# Patient Record
Sex: FEMALE | Race: WHITE | NOT HISPANIC OR LATINO | Employment: UNEMPLOYED | URBAN - METROPOLITAN AREA
[De-identification: names, ages, dates, MRNs, and addresses within clinical notes are randomized per-mention and may not be internally consistent; named-entity substitution may affect disease eponyms.]

---

## 2017-06-19 RX ORDER — ROSUVASTATIN CALCIUM 10 MG/1
TABLET, FILM COATED ORAL
Qty: 30 TABLET | Refills: 2 | OUTPATIENT
Start: 2017-06-19

## 2017-06-20 ENCOUNTER — CLINICAL SUPPORT (OUTPATIENT)
Dept: INTERNAL MEDICINE | Facility: CLINIC | Age: 57
End: 2017-06-20

## 2017-06-20 DIAGNOSIS — M85.80 OSTEOPENIA: ICD-10-CM

## 2017-06-20 DIAGNOSIS — E78.5 HYPERLIPIDEMIA, UNSPECIFIED HYPERLIPIDEMIA TYPE: ICD-10-CM

## 2017-06-20 DIAGNOSIS — Z00.00 HEALTH CARE MAINTENANCE: Primary | ICD-10-CM

## 2017-06-20 DIAGNOSIS — Z78.0 POSTMENOPAUSAL: Primary | ICD-10-CM

## 2017-06-20 PROCEDURE — 77080 DXA BONE DENSITY AXIAL: CPT | Performed by: INTERNAL MEDICINE

## 2017-06-21 LAB
ALBUMIN SERPL-MCNC: 4.6 G/DL (ref 3.5–5.2)
ALBUMIN/GLOB SERPL: 1.8 G/DL
ALP SERPL-CCNC: 58 U/L (ref 39–117)
ALT SERPL-CCNC: 20 U/L (ref 1–33)
APPEARANCE UR: CLEAR
AST SERPL-CCNC: 17 U/L (ref 1–32)
BACTERIA #/AREA URNS HPF: ABNORMAL /HPF
BASOPHILS # BLD AUTO: 0.05 10*3/MM3 (ref 0–0.2)
BASOPHILS NFR BLD AUTO: 1.1 % (ref 0–1.5)
BILIRUB SERPL-MCNC: 0.3 MG/DL (ref 0.1–1.2)
BILIRUB UR QL STRIP: NEGATIVE
BUN SERPL-MCNC: 15 MG/DL (ref 6–20)
BUN/CREAT SERPL: 17.6 (ref 7–25)
CALCIUM SERPL-MCNC: 9.5 MG/DL (ref 8.6–10.5)
CHLORIDE SERPL-SCNC: 105 MMOL/L (ref 98–107)
CHOLEST SERPL-MCNC: 198 MG/DL (ref 0–200)
CO2 SERPL-SCNC: 27 MMOL/L (ref 22–29)
COLOR UR: YELLOW
CREAT SERPL-MCNC: 0.85 MG/DL (ref 0.57–1)
EOSINOPHIL # BLD AUTO: 0.1 10*3/MM3 (ref 0–0.7)
EOSINOPHIL NFR BLD AUTO: 2.3 % (ref 0.3–6.2)
EPI CELLS #/AREA URNS HPF: ABNORMAL /HPF
ERYTHROCYTE [DISTWIDTH] IN BLOOD BY AUTOMATED COUNT: 13.4 % (ref 11.7–13)
GLOBULIN SER CALC-MCNC: 2.5 GM/DL
GLUCOSE SERPL-MCNC: 103 MG/DL (ref 65–99)
GLUCOSE UR QL: NEGATIVE
HCT VFR BLD AUTO: 41.3 % (ref 35.6–45.5)
HCV AB S/CO SERPL IA: <0.1 S/CO RATIO (ref 0–0.9)
HDLC SERPL-MCNC: 51 MG/DL (ref 40–60)
HGB BLD-MCNC: 13.5 G/DL (ref 11.9–15.5)
HGB UR QL STRIP: ABNORMAL
IMM GRANULOCYTES # BLD: 0 10*3/MM3 (ref 0–0.03)
IMM GRANULOCYTES NFR BLD: 0 % (ref 0–0.5)
KETONES UR QL STRIP: NEGATIVE
LDLC SERPL CALC-MCNC: 117 MG/DL (ref 0–100)
LDLC/HDLC SERPL: 2.3 {RATIO}
LEUKOCYTE ESTERASE UR QL STRIP: NEGATIVE
LYMPHOCYTES # BLD AUTO: 1.18 10*3/MM3 (ref 0.9–4.8)
LYMPHOCYTES NFR BLD AUTO: 26.9 % (ref 19.6–45.3)
MCH RBC QN AUTO: 30 PG (ref 26.9–32)
MCHC RBC AUTO-ENTMCNC: 32.7 G/DL (ref 32.4–36.3)
MCV RBC AUTO: 91.8 FL (ref 80.5–98.2)
MICRO URNS: ABNORMAL
MONOCYTES # BLD AUTO: 0.27 10*3/MM3 (ref 0.2–1.2)
MONOCYTES NFR BLD AUTO: 6.2 % (ref 5–12)
MUCOUS THREADS URNS QL MICRO: PRESENT /HPF
NEUTROPHILS # BLD AUTO: 2.79 10*3/MM3 (ref 1.9–8.1)
NEUTROPHILS NFR BLD AUTO: 63.5 % (ref 42.7–76)
NITRITE UR QL STRIP: NEGATIVE
PH UR STRIP: 5.5 [PH] (ref 5–7.5)
PLATELET # BLD AUTO: 288 10*3/MM3 (ref 140–500)
POTASSIUM SERPL-SCNC: 4.5 MMOL/L (ref 3.5–5.2)
PROT SERPL-MCNC: 7.1 G/DL (ref 6–8.5)
PROT UR QL STRIP: NEGATIVE
RBC # BLD AUTO: 4.5 10*6/MM3 (ref 3.9–5.2)
RBC #/AREA URNS HPF: ABNORMAL /HPF
SODIUM SERPL-SCNC: 146 MMOL/L (ref 136–145)
SP GR UR: 1.02 (ref 1–1.03)
TRIGL SERPL-MCNC: 148 MG/DL (ref 0–150)
TSH SERPL DL<=0.005 MIU/L-ACNC: 2.94 MIU/ML (ref 0.27–4.2)
URINALYSIS REFLEX: ABNORMAL
UROBILINOGEN UR STRIP-MCNC: 0.2 MG/DL (ref 0.2–1)
VLDLC SERPL CALC-MCNC: 29.6 MG/DL (ref 5–40)
WBC # BLD AUTO: 4.39 10*3/MM3 (ref 4.5–10.7)
WBC #/AREA URNS HPF: ABNORMAL /HPF

## 2017-06-23 NOTE — PROGRESS NOTES
Your laboratory results are NORMAL. We will discuss these results in detail at your next office visit. Please call with any questions or concerns.  Sincerely,  Tiffanie Woodard MD

## 2017-06-27 ENCOUNTER — OFFICE VISIT (OUTPATIENT)
Dept: INTERNAL MEDICINE | Facility: CLINIC | Age: 57
End: 2017-06-27

## 2017-06-27 VITALS
HEART RATE: 68 BPM | OXYGEN SATURATION: 98 % | SYSTOLIC BLOOD PRESSURE: 142 MMHG | WEIGHT: 164 LBS | BODY MASS INDEX: 29.06 KG/M2 | HEIGHT: 63 IN | DIASTOLIC BLOOD PRESSURE: 70 MMHG

## 2017-06-27 DIAGNOSIS — Z00.00 HEALTH CARE MAINTENANCE: ICD-10-CM

## 2017-06-27 DIAGNOSIS — E78.5 HYPERLIPIDEMIA, UNSPECIFIED HYPERLIPIDEMIA TYPE: Primary | ICD-10-CM

## 2017-06-27 DIAGNOSIS — Z86.14 HISTORY OF MRSA INFECTION: ICD-10-CM

## 2017-06-27 PROCEDURE — 99396 PREV VISIT EST AGE 40-64: CPT | Performed by: INTERNAL MEDICINE

## 2017-06-27 PROCEDURE — 93000 ELECTROCARDIOGRAM COMPLETE: CPT | Performed by: INTERNAL MEDICINE

## 2017-06-27 RX ORDER — ROSUVASTATIN CALCIUM 10 MG/1
10 TABLET, FILM COATED ORAL DAILY
Qty: 90 TABLET | Refills: 3 | Status: SHIPPED | OUTPATIENT
Start: 2017-06-27 | End: 2018-07-10 | Stop reason: SDUPTHER

## 2017-06-27 RX ORDER — AZELASTINE HYDROCHLORIDE, FLUTICASONE PROPIONATE 137; 50 UG/1; UG/1
SPRAY, METERED NASAL
COMMUNITY
End: 2018-04-30 | Stop reason: HOSPADM

## 2017-06-27 NOTE — PROGRESS NOTES
Subjective   CPE, elevated bp    Brandy El is a 57 y.o. female who presents for a complete physical exam.  She is in general feeling well. She had eye surgery in June and bp was elevated at that time. She has elevated bp today as well. Reports some stressors in her family. This is causing her to have difficulties with sleep and worry. She has been to therapy in the remote past and does not want to restart this. She is following w/ a psychiatrist every 3 months and is taking zoloft. She reports that after her eye surgery she required large dose of ibuprofen for pain control. Stopped this about 2 weeks ago. Pain is now well controlled. She is having some swelling/ echymosis at post surgical site. Has microscopic hematuria. This has been evaluated fully by dr. Chito gama.             Review of Systems   Constitutional: Negative.    HENT: Negative.    Eyes: Negative.         S/p surgical changes/ doing well   Respiratory: Negative.    Cardiovascular: Negative.    Gastrointestinal: Negative.    Endocrine: Negative.    Genitourinary: Negative.    Musculoskeletal: Negative.    Skin: Negative.    Allergic/Immunologic: Negative.    Neurological: Negative.    Hematological: Negative.    Psychiatric/Behavioral: Positive for sleep disturbance. The patient is nervous/anxious.        The following portions of the patient's history were reviewed and updated as appropriate: allergies, current medications, past family history, past medical history, past social history, past surgical history and problem list.     Patient Active Problem List   Diagnosis   • Health care maintenance   • Hyperlipidemia       Past Medical History:   Diagnosis Date   • Anxiety    • Depression    • Gait disturbance     R foot drop   • Hyperlipidemia        Past Surgical History:   Procedure Laterality Date   • ANKLE SURGERY     • BACK SURGERY         Family History   Problem Relation Age of Onset   • COPD Mother    • Heart disease Father    • Heart  "disease Brother    • Cancer Brother    • COPD Brother        Social History     Social History   • Marital status:      Spouse name: N/A   • Number of children: N/A   • Years of education: N/A     Occupational History   • Not on file.     Social History Main Topics   • Smoking status: Never Smoker   • Smokeless tobacco: Not on file   • Alcohol use Yes   • Drug use: No   • Sexual activity: Yes     Other Topics Concern   • Not on file     Social History Narrative       Current Outpatient Prescriptions on File Prior to Visit   Medication Sig Dispense Refill   • CRESTOR 10 MG tablet Take 1 tablet by mouth daily. 90 tablet 3   • sertraline (ZOLOFT) 100 MG tablet Take  by mouth daily.     • amoxicillin (AMOXIL) 875 MG tablet Take 1 tablet by mouth 2 (two) times a day. 10 tablet 0   • neomycin-polymyxin-hydrocortisone (CORTISPORIN) 3.5-71959-7 otic solution Administer 3 drops into both ears 3 (three) times a day. 10 mL 1   • triamcinolone (KENALOG) 0.1 % ointment Apply  topically 2 (two) times a day. 15 g 0     No current facility-administered medications on file prior to visit.        No Known Allergies    Immunization History   Administered Date(s) Administered   • Tdap 06/25/2013       Objective     /70  Pulse 68  Ht 63\" (160 cm)  Wt 164 lb (74.4 kg)  SpO2 98%  BMI 29.05 kg/m2    Physical Exam   Constitutional: She is oriented to person, place, and time. She appears well-developed and well-nourished.   HENT:   Head: Normocephalic and atraumatic.   Right Ear: External ear normal.   Left Ear: External ear normal.   Nose: Nose normal.   Mouth/Throat: Oropharynx is clear and moist.   Eyes: EOM are normal. Pupils are equal, round, and reactive to light.   Post operative hematoma medial to eye. conjusctival erythema   Neck: Neck supple.   Cardiovascular: Normal rate, regular rhythm and normal heart sounds.    Pulmonary/Chest: Effort normal and breath sounds normal. No respiratory distress.   Abdominal: Soft. "   Musculoskeletal: Normal range of motion.   Neurological: She is alert and oriented to person, place, and time.   Skin: Skin is warm and dry.   Psychiatric: She has a normal mood and affect. Her behavior is normal.   Nursing note and vitals reviewed.  EKG for elevated bp. Sinus St changes in lat leads. Unchanged from 2015. Neg stress echo 2015.     Assessment/Plan   Brandy was seen today for annual exam.    Diagnoses and all orders for this visit:    Hyperlipidemia, unspecified hyperlipidemia type  -     ECG 12 Lead    Health care maintenance    History of MRSA infection  -     MRSA Screen; Future    Other orders  -     mupirocin (BACTROBAN NASAL) 2 % nasal ointment; into each nostril 2 (Two) Times a Day.        Discussion    Patient presents today for a CPE.  She is doing well. bp is mildly elevated. Gave lit on DASH diet. She should increase fitness and water. Avoid nsaids. Repeat bp in 2-3 weeks. She has a h/o MRSA. Will test for MRSA in the nares. Will treat w/ mupiricin empyrically. She may f/u w/ eye specialist for possible drainage of hematoma. Labs reviewed. She will f/u w/ her psychiatrist to discuss possibly changing zoloft. She declines referral to counseling. She will start a gratitude journal.     Patient follows a healthy diet.   Patient follows an adequate exercise regimen. Patient will schedule a mammogram. Colon cancer screening is up to date.   Pap smears are performed by the patient's gynecologist.   Immunizations are up to date.           No future appointments.

## 2017-06-30 LAB — MRSA SPEC QL CULT: NEGATIVE

## 2017-11-30 ENCOUNTER — OFFICE VISIT (OUTPATIENT)
Dept: OBSTETRICS AND GYNECOLOGY | Age: 57
End: 2017-11-30

## 2017-11-30 VITALS
HEIGHT: 63 IN | DIASTOLIC BLOOD PRESSURE: 80 MMHG | BODY MASS INDEX: 28.35 KG/M2 | WEIGHT: 160 LBS | SYSTOLIC BLOOD PRESSURE: 130 MMHG

## 2017-11-30 DIAGNOSIS — Z01.419 ENCOUNTER FOR GYNECOLOGICAL EXAMINATION: Primary | ICD-10-CM

## 2017-11-30 PROCEDURE — 99396 PREV VISIT EST AGE 40-64: CPT | Performed by: NURSE PRACTITIONER

## 2017-12-04 LAB
CONV .: NORMAL
CYTOLOGIST CVX/VAG CYTO: NORMAL
CYTOLOGY CVX/VAG DOC THIN PREP: NORMAL
DX ICD CODE: NORMAL
HIV 1 & 2 AB SER-IMP: NORMAL
OTHER STN SPEC: NORMAL
PATH REPORT.FINAL DX SPEC: NORMAL
STAT OF ADQ CVX/VAG CYTO-IMP: NORMAL

## 2018-04-30 ENCOUNTER — OFFICE VISIT (OUTPATIENT)
Dept: INTERNAL MEDICINE | Facility: CLINIC | Age: 58
End: 2018-04-30

## 2018-04-30 VITALS
WEIGHT: 166 LBS | HEART RATE: 80 BPM | DIASTOLIC BLOOD PRESSURE: 80 MMHG | OXYGEN SATURATION: 98 % | BODY MASS INDEX: 29.41 KG/M2 | SYSTOLIC BLOOD PRESSURE: 140 MMHG

## 2018-04-30 DIAGNOSIS — L08.9 INFECTED SEBACEOUS CYST OF SKIN: Primary | ICD-10-CM

## 2018-04-30 DIAGNOSIS — L72.3 INFECTED SEBACEOUS CYST OF SKIN: Primary | ICD-10-CM

## 2018-04-30 PROCEDURE — 99213 OFFICE O/P EST LOW 20 MIN: CPT | Performed by: INTERNAL MEDICINE

## 2018-04-30 RX ORDER — SULFAMETHOXAZOLE AND TRIMETHOPRIM 800; 160 MG/1; MG/1
1 TABLET ORAL 2 TIMES DAILY
Qty: 14 TABLET | Refills: 0 | Status: SHIPPED | OUTPATIENT
Start: 2018-04-30 | End: 2018-08-13

## 2018-04-30 NOTE — PROGRESS NOTES
Chief Complaint   Patient presents with   • Cyst       History of Present Illness   Brandy El is a 57 y.o. female presents for acute care. She has an inflamed cyst in the vaginal region. First noted about 5 days ago. Some drainage about 2 days ago. Low grade fever. Erythema surrounding site. H/o MRSA.     The following portions of the patient's history were reviewed and updated as appropriate: allergies, current medications, past family history, past medical history, past social history, past surgical history and problem list.  Current Outpatient Prescriptions on File Prior to Visit   Medication Sig Dispense Refill   • CRESTOR 10 MG tablet Take 1 tablet by mouth Daily. 90 tablet 3   • sertraline (ZOLOFT) 100 MG tablet Take  by mouth daily.     • [DISCONTINUED] Azelastine-Fluticasone (DYMISTA) 137-50 MCG/ACT suspension into each nostril.       No current facility-administered medications on file prior to visit.      Review of Systems   Constitutional: Positive for fatigue and fever.   HENT: Negative.    Eyes: Negative.    Respiratory: Negative.    Cardiovascular: Negative.    Gastrointestinal: Negative.    Endocrine: Negative.    Genitourinary: Negative.    Musculoskeletal: Negative.    Skin:        Inflamed cyst. See hpi.    Allergic/Immunologic: Negative.    Neurological: Negative.    Hematological: Negative.        Objective   Physical Exam   Constitutional: She is oriented to person, place, and time. She appears well-developed and well-nourished.   HENT:   Head: Normocephalic and atraumatic.   Eyes: EOM are normal. Pupils are equal, round, and reactive to light.   Cardiovascular: Normal rate and regular rhythm.    Pulmonary/Chest: Effort normal.   Musculoskeletal: Normal range of motion.   Neurological: She is alert and oriented to person, place, and time.   Skin: Skin is warm and dry.   Inflamed cyst left upper vaginal region. Surrounding erythema. Site of apparent drainage.    Psychiatric: She has a normal  mood and affect. Her behavior is normal. Judgment and thought content normal.        /80   Pulse 80   Wt 75.3 kg (166 lb)   SpO2 98%   BMI 29.41 kg/m²     Assessment/Plan   Diagnoses and all orders for this visit:    Infected sebaceous cyst of skin    Other orders  -     sulfamethoxazole-trimethoprim (BACTRIM DS) 800-160 MG per tablet; Take 1 tablet by mouth 2 (Two) Times a Day.  -     mupirocin (BACTROBAN) 2 % ointment; Apply  topically 3 (Three) Times a Day.        Patient w/ infected sebaceous cyst. Has self drained so will not repeat I and D in office today. Given surrounding erythema and h/o MRSA Will try oral bactrim bid x 7 d w/ mupirocin ointment. Will avoid sun exposure. Follow up if worsens or no improvement.

## 2018-07-05 RX ORDER — ROSUVASTATIN CALCIUM 10 MG/1
TABLET, FILM COATED ORAL
Qty: 30 TABLET | Refills: 2 | OUTPATIENT
Start: 2018-07-05

## 2018-07-10 RX ORDER — ROSUVASTATIN CALCIUM 10 MG/1
TABLET, FILM COATED ORAL
Qty: 30 TABLET | Refills: 2 | Status: SHIPPED | OUTPATIENT
Start: 2018-07-10 | End: 2018-11-12 | Stop reason: SDUPTHER

## 2018-08-13 ENCOUNTER — OFFICE VISIT (OUTPATIENT)
Dept: INTERNAL MEDICINE | Facility: CLINIC | Age: 58
End: 2018-08-13

## 2018-08-13 VITALS
BODY MASS INDEX: 29.23 KG/M2 | SYSTOLIC BLOOD PRESSURE: 130 MMHG | WEIGHT: 165 LBS | HEART RATE: 69 BPM | DIASTOLIC BLOOD PRESSURE: 74 MMHG | OXYGEN SATURATION: 98 %

## 2018-08-13 DIAGNOSIS — L30.9 DERMATITIS: ICD-10-CM

## 2018-08-13 DIAGNOSIS — R19.7 DIARRHEA, UNSPECIFIED TYPE: ICD-10-CM

## 2018-08-13 DIAGNOSIS — E78.5 HYPERLIPIDEMIA, UNSPECIFIED HYPERLIPIDEMIA TYPE: ICD-10-CM

## 2018-08-13 DIAGNOSIS — Z00.00 HEALTHCARE MAINTENANCE: Primary | ICD-10-CM

## 2018-08-13 PROCEDURE — 90632 HEPA VACCINE ADULT IM: CPT | Performed by: INTERNAL MEDICINE

## 2018-08-13 PROCEDURE — 93000 ELECTROCARDIOGRAM COMPLETE: CPT | Performed by: INTERNAL MEDICINE

## 2018-08-13 PROCEDURE — 99396 PREV VISIT EST AGE 40-64: CPT | Performed by: INTERNAL MEDICINE

## 2018-08-13 PROCEDURE — 90471 IMMUNIZATION ADMIN: CPT | Performed by: INTERNAL MEDICINE

## 2018-08-13 RX ORDER — HYDROCORTISONE BUTYRATE 1 MG/G
OINTMENT TOPICAL EVERY 12 HOURS SCHEDULED
Qty: 45 G | Refills: 1 | Status: SHIPPED | OUTPATIENT
Start: 2018-08-13 | End: 2019-02-19

## 2018-08-13 RX ORDER — DICYCLOMINE HYDROCHLORIDE 10 MG/1
10 CAPSULE ORAL
Qty: 30 CAPSULE | Refills: 2 | Status: SHIPPED | OUTPATIENT
Start: 2018-08-13 | End: 2019-02-19

## 2018-08-13 NOTE — PROGRESS NOTES
Subjective   CPE, diarrhea  Brandy El is a 58 y.o. female who presents for a complete physical exam as well as to discuss needs that are new to me. She reports that she has had a relatively new onset of loose stooling. She had explosive diarrhea in early July. She thought that this was a stomach bug. Low grade fever upper 99s. 48 hours later it resolved without meds and she then had constipation. She did have associated cramping and pain. Since that time she has alternated loose stools with normal stools and constipation. Has not taken any medications for this. She does again have low grade fever when she has loose stools.         Review of Systems   Constitutional: Positive for fever.   HENT: Negative.    Eyes: Negative.    Respiratory: Negative.    Cardiovascular: Negative.    Gastrointestinal: Positive for diarrhea.   Endocrine: Negative.    Genitourinary: Negative.    Musculoskeletal: Negative.    Skin: Positive for rash.   Allergic/Immunologic: Negative.    Neurological: Negative.    Hematological: Negative.    Psychiatric/Behavioral: Negative.        The following portions of the patient's history were reviewed and updated as appropriate: allergies, current medications, past family history, past medical history, past social history, past surgical history and problem list.     Patient Active Problem List   Diagnosis   • Health care maintenance   • Hyperlipidemia   • History of MRSA infection       Past Medical History:   Diagnosis Date   • Anxiety    • ASCUS of cervix with negative high risk HPV 09/2001   • ASCUS of cervix with negative high risk HPV 09/2005   • Depression    • Essential hematuria    • Gait disturbance     R foot drop   • Hyperlipidemia    • Postmenopausal    • Vaginal delivery        Past Surgical History:   Procedure Laterality Date   • ANKLE SURGERY     • BACK SURGERY     • LAPAROSCOPIC TUBAL LIGATION Bilateral    • TEAR DUCT SURGERY  06/2017       Family History   Problem Relation Age of  Onset   • COPD Mother    • Heart disease Father    • Heart disease Brother    • Cancer Brother    • COPD Brother    • Breast cancer Sister    • No Known Problems Daughter    • No Known Problems Son    • No Known Problems Maternal Grandmother    • No Known Problems Paternal Grandmother    • No Known Problems Maternal Aunt    • No Known Problems Paternal Aunt    • BRCA 1/2 Neg Hx    • Colon cancer Neg Hx    • Endometrial cancer Neg Hx    • Ovarian cancer Neg Hx        Social History     Social History   • Marital status:      Spouse name: N/A   • Number of children: N/A   • Years of education: N/A     Occupational History   • Not on file.     Social History Main Topics   • Smoking status: Never Smoker   • Smokeless tobacco: Not on file   • Alcohol use Yes   • Drug use: No   • Sexual activity: Yes     Birth control/ protection: Post-menopausal     Other Topics Concern   • Not on file     Social History Narrative   • No narrative on file       Current Outpatient Prescriptions on File Prior to Visit   Medication Sig Dispense Refill   • CRESTOR 10 MG tablet TAKE ONE TABLET BY MOUTH DAILY 30 tablet 2   • sertraline (ZOLOFT) 100 MG tablet Take  by mouth daily.     • mupirocin (BACTROBAN) 2 % ointment Apply  topically 3 (Three) Times a Day. 15 g 0   • [DISCONTINUED] sulfamethoxazole-trimethoprim (BACTRIM DS) 800-160 MG per tablet Take 1 tablet by mouth 2 (Two) Times a Day. 14 tablet 0     No current facility-administered medications on file prior to visit.        No Known Allergies    Immunization History   Administered Date(s) Administered   • Tdap 06/25/2013       Objective     /74   Pulse 69   Wt 74.8 kg (165 lb)   SpO2 98%   BMI 29.23 kg/m²     Physical Exam   Constitutional: She is oriented to person, place, and time. She appears well-developed and well-nourished.   HENT:   Head: Normocephalic and atraumatic.   Right Ear: Hearing, tympanic membrane and external ear normal.   Left Ear: Hearing, tympanic  membrane and external ear normal.   Nose: Nose normal.   Mouth/Throat: Oropharynx is clear and moist.   Eyes: Pupils are equal, round, and reactive to light. Conjunctivae and EOM are normal.   Neck: Normal range of motion. Neck supple. No thyromegaly present.   Cardiovascular: Normal rate, regular rhythm, normal heart sounds and intact distal pulses.    No murmur heard.  Pulmonary/Chest: Effort normal and breath sounds normal. Right breast exhibits no mass. Left breast exhibits no mass.   Abdominal: Soft. She exhibits no distension. There is no hepatosplenomegaly. There is no tenderness.   Genitourinary: No breast tenderness.   Musculoskeletal: Normal range of motion.   Lymphadenopathy:     She has no cervical adenopathy.   Neurological: She is alert and oriented to person, place, and time.   Skin: Skin is warm and dry. Rash noted.   Psychiatric: She has a normal mood and affect. Her speech is normal and behavior is normal. Judgment and thought content normal. Cognition and memory are normal.   Nursing note and vitals reviewed.      Assessment/Plan   Brandy was seen today for diarrhea.    Diagnoses and all orders for this visit:    Healthcare maintenance  -     CBC & Differential  -     Comprehensive Metabolic Panel  -     Lipid Panel With LDL / HDL Ratio  -     Celiac Disease Panel  -     TSH    Diarrhea, unspecified type  -     Ambulatory Referral to Gastroenterology  -     CBC & Differential  -     Comprehensive Metabolic Panel  -     Lipid Panel With LDL / HDL Ratio  -     Celiac Disease Panel    Hyperlipidemia, unspecified hyperlipidemia type  -     ECG 12 Lead    Dermatitis    Other orders  -     dicyclomine (BENTYL) 10 MG capsule; Take 1 capsule by mouth 4 (Four) Times a Day Before Meals & at Bedtime.  -     hydrocortisone butyrate (LOCOID) 0.1 % ointment ointment; Apply  topically to the appropriate area as directed Every 12 (Twelve) Hours.        Discussion    Patient presents today for a CPE.      Patient  follows a healthy diet.   Patient follows an adequate exercise regimen. Mammogram is up to date.   Colon cancer screening is up to date.   Patient has been referred for colon cancer screening.   Pap smears are performed by the patient's gynecologist.   Immunizations are up to date.    I have recommended that the patient get the new HZV shot called Shingrix and hepatitis A immunizations. Listed labs will be tested. Patient has a family history of celiac and she has new gi symptoms. Will test for celiac and listed labs. Try bentyl prn. Trial lactose free. Do a food diary. She may be due for cscope and will f/u w/ gi for this. F/u here in 3 mo or prn.           Future Appointments  Date Time Provider Department Center   3/27/2019 8:00 AM [facility] None   4/1/2019 3:30 PM Tiffanie Woodard MD [facility] None

## 2018-08-14 LAB
ALBUMIN SERPL-MCNC: 5 G/DL (ref 3.5–5.2)
ALBUMIN/GLOB SERPL: 2.3 G/DL
ALP SERPL-CCNC: 58 U/L (ref 39–117)
ALT SERPL-CCNC: 20 U/L (ref 1–33)
AST SERPL-CCNC: 14 U/L (ref 1–32)
BASOPHILS # BLD AUTO: 0.04 10*3/MM3 (ref 0–0.2)
BASOPHILS NFR BLD AUTO: 1 % (ref 0–1.5)
BILIRUB SERPL-MCNC: 0.4 MG/DL (ref 0.1–1.2)
BUN SERPL-MCNC: 18 MG/DL (ref 6–20)
BUN/CREAT SERPL: 22.5 (ref 7–25)
CALCIUM SERPL-MCNC: 9.2 MG/DL (ref 8.6–10.5)
CHLORIDE SERPL-SCNC: 102 MMOL/L (ref 98–107)
CHOLEST SERPL-MCNC: 196 MG/DL (ref 0–200)
CO2 SERPL-SCNC: 27.8 MMOL/L (ref 22–29)
CREAT SERPL-MCNC: 0.8 MG/DL (ref 0.57–1)
ENDOMYSIUM IGA SER QL: NEGATIVE
EOSINOPHIL # BLD AUTO: 0.11 10*3/MM3 (ref 0–0.7)
EOSINOPHIL NFR BLD AUTO: 2.8 % (ref 0.3–6.2)
ERYTHROCYTE [DISTWIDTH] IN BLOOD BY AUTOMATED COUNT: 13 % (ref 11.7–13)
GLOBULIN SER CALC-MCNC: 2.2 GM/DL
GLUCOSE SERPL-MCNC: 100 MG/DL (ref 65–99)
HCT VFR BLD AUTO: 44.7 % (ref 35.6–45.5)
HDLC SERPL-MCNC: 51 MG/DL (ref 40–60)
HGB BLD-MCNC: 14.1 G/DL (ref 11.9–15.5)
IGA SERPL-MCNC: 183 MG/DL (ref 87–352)
IMM GRANULOCYTES # BLD: 0 10*3/MM3 (ref 0–0.03)
IMM GRANULOCYTES NFR BLD: 0 % (ref 0–0.5)
LDLC SERPL CALC-MCNC: 114 MG/DL (ref 0–100)
LDLC/HDLC SERPL: 2.24 {RATIO}
LYMPHOCYTES # BLD AUTO: 0.99 10*3/MM3 (ref 0.9–4.8)
LYMPHOCYTES NFR BLD AUTO: 25.3 % (ref 19.6–45.3)
MCH RBC QN AUTO: 29.4 PG (ref 26.9–32)
MCHC RBC AUTO-ENTMCNC: 31.5 G/DL (ref 32.4–36.3)
MCV RBC AUTO: 93.3 FL (ref 80.5–98.2)
MONOCYTES # BLD AUTO: 0.23 10*3/MM3 (ref 0.2–1.2)
MONOCYTES NFR BLD AUTO: 5.9 % (ref 5–12)
NEUTROPHILS # BLD AUTO: 2.54 10*3/MM3 (ref 1.9–8.1)
NEUTROPHILS NFR BLD AUTO: 65 % (ref 42.7–76)
PLATELET # BLD AUTO: 228 10*3/MM3 (ref 140–500)
POTASSIUM SERPL-SCNC: 4.3 MMOL/L (ref 3.5–5.2)
PROT SERPL-MCNC: 7.2 G/DL (ref 6–8.5)
RBC # BLD AUTO: 4.79 10*6/MM3 (ref 3.9–5.2)
SODIUM SERPL-SCNC: 144 MMOL/L (ref 136–145)
TRIGL SERPL-MCNC: 155 MG/DL (ref 0–150)
TSH SERPL DL<=0.005 MIU/L-ACNC: 1.7 MIU/ML (ref 0.27–4.2)
TTG IGA SER-ACNC: <2 U/ML (ref 0–3)
VLDLC SERPL CALC-MCNC: 31 MG/DL (ref 5–40)
WBC # BLD AUTO: 3.91 10*3/MM3 (ref 4.5–10.7)

## 2018-10-17 ENCOUNTER — OFFICE VISIT (OUTPATIENT)
Dept: GASTROENTEROLOGY | Facility: CLINIC | Age: 58
End: 2018-10-17

## 2018-10-17 VITALS
SYSTOLIC BLOOD PRESSURE: 128 MMHG | WEIGHT: 168 LBS | TEMPERATURE: 98.6 F | BODY MASS INDEX: 29.77 KG/M2 | HEIGHT: 63 IN | DIASTOLIC BLOOD PRESSURE: 76 MMHG

## 2018-10-17 DIAGNOSIS — Z80.0 FH: ESOPHAGEAL CANCER: ICD-10-CM

## 2018-10-17 DIAGNOSIS — R10.13 DYSPEPSIA: ICD-10-CM

## 2018-10-17 DIAGNOSIS — R19.7 DIARRHEA, UNSPECIFIED TYPE: ICD-10-CM

## 2018-10-17 DIAGNOSIS — R19.4 CHANGE IN BOWEL HABITS: Primary | ICD-10-CM

## 2018-10-17 PROCEDURE — 99204 OFFICE O/P NEW MOD 45 MIN: CPT | Performed by: INTERNAL MEDICINE

## 2018-10-17 NOTE — PROGRESS NOTES
"Chief Complaint   Patient presents with   • Diarrhea       History of Present Illness: 59 yo female who c/o a one month h/o diarrhea that started about 6 weeks ago and has now stopped. She has bouts of intermittent diarrhea. This bout 6 weeks ago lasted longer than usual.  NO rectal bleeding or melena. Occasional constipation. \"I have always had a messed up stomach\". She has periumbilical and left sideed abdominal pain intermittently that is worse when she has diarrhea. The dicyclomine helps. Some nausea, no vomiting. Occasional heartburn. Weight stable. No foreign travel. Lasst antibiotics: 1 yr ago. No new meds.  We reviewed 8/18 labs done. Brother had esophageal cancer.     Past Medical History:   Diagnosis Date   • Anxiety    • ASCUS of cervix with negative high risk HPV 09/2001   • ASCUS of cervix with negative high risk HPV 09/2005   • Depression    • Essential hematuria    • Gait disturbance     R foot drop   • Hyperlipidemia    • Postmenopausal    • Vaginal delivery        Past Surgical History:   Procedure Laterality Date   • ANKLE SURGERY     • BACK SURGERY     • COLONOSCOPY  10/22/2013    tics, [facility], [facility]   • LAPAROSCOPIC TUBAL LIGATION Bilateral    • TEAR DUCT SURGERY  06/2017         Current Outpatient Prescriptions:   •  CRESTOR 10 MG tablet, TAKE ONE TABLET BY MOUTH DAILY, Disp: 30 tablet, Rfl: 2  •  dicyclomine (BENTYL) 10 MG capsule, Take 1 capsule by mouth 4 (Four) Times a Day Before Meals & at Bedtime., Disp: 30 capsule, Rfl: 2  •  hydrocortisone butyrate (LOCOID) 0.1 % ointment ointment, Apply  topically to the appropriate area as directed Every 12 (Twelve) Hours., Disp: 45 g, Rfl: 1  •  mupirocin (BACTROBAN) 2 % ointment, Apply  topically 3 (Three) Times a Day., Disp: 15 g, Rfl: 0  •  sertraline (ZOLOFT) 100 MG tablet, Take  by mouth daily., Disp: , Rfl:     No Known Allergies    Family History   Problem Relation Age of Onset   • COPD Mother    • Heart disease Father    • Heart disease Brother    • " Cancer Brother    • COPD Brother    • Breast cancer Sister    • No Known Problems Daughter    • No Known Problems Son    • No Known Problems Maternal Grandmother    • No Known Problems Paternal Grandmother    • No Known Problems Maternal Aunt    • No Known Problems Paternal Aunt    • BRCA 1/2 Neg Hx    • Colon cancer Neg Hx    • Endometrial cancer Neg Hx    • Ovarian cancer Neg Hx        Social History     Social History   • Marital status:      Spouse name: N/A   • Number of children: N/A   • Years of education: N/A     Occupational History   • Not on file.     Social History Main Topics   • Smoking status: Never Smoker   • Smokeless tobacco: Never Used   • Alcohol use Yes      Comment: 2 x weekly   • Drug use: No   • Sexual activity: Yes     Birth control/ protection: Post-menopausal     Other Topics Concern   • Not on file     Social History Narrative   • No narrative on file       Review of Systems   Constitutional: Negative for fever.   Gastrointestinal: Positive for abdominal pain, constipation, diarrhea and nausea. Negative for vomiting.   Genitourinary: Negative for dysuria, frequency and hematuria.   Musculoskeletal: Negative for arthralgias and myalgias.   Neurological: Negative for headaches.   All other systems reviewed and are negative.      Vitals:    10/17/18 0834   BP: 128/76   Temp: 98.6 °F (37 °C)       Physical Exam   Constitutional: She is oriented to person, place, and time. She appears well-developed and well-nourished. No distress.   HENT:   Head: Normocephalic and atraumatic. Hair is normal.   Right Ear: Hearing, tympanic membrane, external ear and ear canal normal. No drainage. No decreased hearing is noted.   Left Ear: Hearing, tympanic membrane, external ear and ear canal normal. No decreased hearing is noted.   Nose: No nasal deformity.   Mouth/Throat: Oropharynx is clear and moist.   Eyes: Pupils are equal, round, and reactive to light. Conjunctivae, EOM and lids are normal. Right  eye exhibits no discharge. Left eye exhibits no discharge.   Neck: Normal range of motion. Neck supple. No JVD present. No tracheal deviation present. No thyromegaly present.   Cardiovascular: Normal rate, regular rhythm, normal heart sounds, intact distal pulses and normal pulses.  Exam reveals no gallop and no friction rub.    No murmur heard.  Pulmonary/Chest: Effort normal and breath sounds normal. No respiratory distress. She has no wheezes. She has no rales. She exhibits no tenderness.   Abdominal: Soft. Bowel sounds are normal. She exhibits no distension and no mass. There is no tenderness. There is no rebound and no guarding. No hernia.   Musculoskeletal: Normal range of motion. She exhibits no edema, tenderness or deformity.   Lymphadenopathy:     She has no cervical adenopathy.   Neurological: She is alert and oriented to person, place, and time. She has normal reflexes. She displays normal reflexes. No cranial nerve deficit. She exhibits normal muscle tone. Coordination normal.   Skin: Skin is warm and dry. No rash noted. She is not diaphoretic. No erythema.   Psychiatric: She has a normal mood and affect. Her behavior is normal. Judgment and thought content normal.   Vitals reviewed.      Brandy was seen today for diarrhea.    Diagnoses and all orders for this visit:    Change in bowel habits  -     Case Request; Standing  -     Case Request    Dyspepsia  -     Case Request; Standing  -     Case Request    FH: esophageal cancer  -     Case Request; Standing  -     Case Request    Diarrhea, unspecified type    Other orders  -     Follow Anesthesia Guidelines / Standing Orders; Future  -     Implement Anesthesia orders day of procedure.; Standing  -     Obtain informed consent; Standing  -     Verify bowel prep was successful; Standing  -     Give tap water enema if bowel prep was insufficient; Standing       Assessment:  1) Episodes of diarrhea.   2) Abdominal pain.  3) Dyspepsia  4) FH (bro) esophageal  cancer    Recommendations:  1) EGD and colonoscopy      No Follow-up on file.    Syd Emerson MD  10/17/2018  Answers for HPI/ROS submitted by the patient on 10/10/2018   Abdominal pain  Chronicity: recurrent  Onset: more than 1 year ago  Onset quality: gradual  Frequency: intermittently  Episode duration: 2 hours  Progression since onset: gradually improving  Pain location: LLQ, LUQ, epigastric region  Pain - numeric: 3/10  Pain quality: cramping  Radiates to: does not radiate  anorexia: No  belching: No  flatus: No  hematochezia: No  melena: No  weight loss: No  Aggravated by: nothing  Relieved by: being still, bowel movements, passing flatus, recumbency

## 2018-11-12 ENCOUNTER — OFFICE VISIT (OUTPATIENT)
Dept: INTERNAL MEDICINE | Facility: CLINIC | Age: 58
End: 2018-11-12

## 2018-11-12 VITALS
DIASTOLIC BLOOD PRESSURE: 62 MMHG | BODY MASS INDEX: 29.58 KG/M2 | SYSTOLIC BLOOD PRESSURE: 120 MMHG | WEIGHT: 167 LBS | OXYGEN SATURATION: 98 % | HEART RATE: 74 BPM

## 2018-11-12 DIAGNOSIS — R19.4 CHANGE IN BOWEL HABITS: Primary | ICD-10-CM

## 2018-11-12 DIAGNOSIS — E78.5 HYPERLIPIDEMIA, UNSPECIFIED HYPERLIPIDEMIA TYPE: ICD-10-CM

## 2018-11-12 PROCEDURE — 99213 OFFICE O/P EST LOW 20 MIN: CPT | Performed by: INTERNAL MEDICINE

## 2018-11-12 RX ORDER — ROSUVASTATIN CALCIUM 10 MG/1
10 TABLET, FILM COATED ORAL DAILY
Qty: 90 TABLET | Refills: 3 | Status: SHIPPED | OUTPATIENT
Start: 2018-11-12 | End: 2020-01-24 | Stop reason: SDUPTHER

## 2018-11-12 NOTE — PROGRESS NOTES
Chief Complaint   Patient presents with   • Hyperlipidemia   hyperlipidemia  diarrhea    History of Present Illness   Brandy El is a 58 y.o. female presents for follow up on gi irritability and hyperlipidemia. Patient is taking crestor with good lipid maintenance. She was having loose stooling episodes with chronic diarrhea. She has made lifestyle modifications and bowel is much more on track. She has been taking bentyl with benefit but has only required this about once a month. She is scheduled for egd and cscope next month. Mood is doing well on zoloft as well.     The following portions of the patient's history were reviewed and updated as appropriate: allergies, current medications, past family history, past medical history, past social history, past surgical history and problem list.  Current Outpatient Medications on File Prior to Visit   Medication Sig Dispense Refill   • dicyclomine (BENTYL) 10 MG capsule Take 1 capsule by mouth 4 (Four) Times a Day Before Meals & at Bedtime. 30 capsule 2   • hydrocortisone butyrate (LOCOID) 0.1 % ointment ointment Apply  topically to the appropriate area as directed Every 12 (Twelve) Hours. 45 g 1   • mupirocin (BACTROBAN) 2 % ointment Apply  topically 3 (Three) Times a Day. 15 g 0   • sertraline (ZOLOFT) 100 MG tablet Take  by mouth daily.     • [DISCONTINUED] CRESTOR 10 MG tablet TAKE ONE TABLET BY MOUTH DAILY 30 tablet 2     No current facility-administered medications on file prior to visit.      Review of Systems   Constitutional: Negative.    HENT: Negative.    Eyes: Negative.    Gastrointestinal: Negative.    Endocrine: Negative.    Genitourinary: Negative.    Musculoskeletal: Negative.    Skin: Negative.    Allergic/Immunologic: Negative.    Neurological: Negative.    Hematological: Negative.    Psychiatric/Behavioral: Negative.        Objective   Physical Exam   Constitutional: She is oriented to person, place, and time. She appears well-developed and  well-nourished.   HENT:   Head: Normocephalic and atraumatic.   Right Ear: External ear normal.   Left Ear: External ear normal.   Nose: Nose normal.   Mouth/Throat: Oropharynx is clear and moist.   Eyes: EOM are normal. Pupils are equal, round, and reactive to light.   Neck: Neck supple.   Cardiovascular: Normal rate, regular rhythm and normal heart sounds.   Pulmonary/Chest: Effort normal and breath sounds normal. No respiratory distress.   Abdominal: Soft.   Musculoskeletal: Normal range of motion.   Neurological: She is alert and oriented to person, place, and time.   Skin: Skin is warm and dry.   Psychiatric: She has a normal mood and affect. Her behavior is normal.   Nursing note and vitals reviewed.       /62   Pulse 74   Wt 75.8 kg (167 lb)   SpO2 98%   BMI 29.58 kg/m²     Assessment/Plan   Diagnoses and all orders for this visit:    Change in bowel habits    Hyperlipidemia, unspecified hyperlipidemia type    Other orders  -     CRESTOR 10 MG tablet; Take 1 tablet by mouth Daily.    patient w/ recent change in bowel. This is now back on track. She will take bentyl prn and she will have egd and cscope as planned. Continue crestor for lipid regulation. August labs reviewed. She will f/u routinely in august as scheduled.

## 2018-12-03 ENCOUNTER — ANESTHESIA (OUTPATIENT)
Dept: GASTROENTEROLOGY | Facility: HOSPITAL | Age: 58
End: 2018-12-03

## 2018-12-03 ENCOUNTER — ANESTHESIA EVENT (OUTPATIENT)
Dept: GASTROENTEROLOGY | Facility: HOSPITAL | Age: 58
End: 2018-12-03

## 2018-12-03 ENCOUNTER — HOSPITAL ENCOUNTER (OUTPATIENT)
Facility: HOSPITAL | Age: 58
Setting detail: HOSPITAL OUTPATIENT SURGERY
Discharge: HOME | End: 2018-12-03
Attending: INTERNAL MEDICINE | Admitting: INTERNAL MEDICINE

## 2018-12-03 VITALS
OXYGEN SATURATION: 94 % | RESPIRATION RATE: 16 BRPM | DIASTOLIC BLOOD PRESSURE: 60 MMHG | TEMPERATURE: 97.8 F | WEIGHT: 167.44 LBS | SYSTOLIC BLOOD PRESSURE: 118 MMHG | BODY MASS INDEX: 29.66 KG/M2 | HEART RATE: 60 BPM

## 2018-12-03 DIAGNOSIS — Z80.0 FH: ESOPHAGEAL CANCER: ICD-10-CM

## 2018-12-03 DIAGNOSIS — R10.13 DYSPEPSIA: ICD-10-CM

## 2018-12-03 DIAGNOSIS — R19.4 CHANGE IN BOWEL HABITS: ICD-10-CM

## 2018-12-03 PROCEDURE — 87081 CULTURE SCREEN ONLY: CPT | Performed by: INTERNAL MEDICINE

## 2018-12-03 PROCEDURE — 43239 EGD BIOPSY SINGLE/MULTIPLE: CPT | Performed by: INTERNAL MEDICINE

## 2018-12-03 PROCEDURE — 25010000002 PROPOFOL 10 MG/ML EMULSION: Performed by: ANESTHESIOLOGY

## 2018-12-03 PROCEDURE — 88305 TISSUE EXAM BY PATHOLOGIST: CPT | Performed by: INTERNAL MEDICINE

## 2018-12-03 PROCEDURE — 45380 COLONOSCOPY AND BIOPSY: CPT | Performed by: INTERNAL MEDICINE

## 2018-12-03 RX ORDER — PROPOFOL 10 MG/ML
VIAL (ML) INTRAVENOUS AS NEEDED
Status: DISCONTINUED | OUTPATIENT
Start: 2018-12-03 | End: 2018-12-03 | Stop reason: SURG

## 2018-12-03 RX ORDER — SODIUM CHLORIDE, SODIUM LACTATE, POTASSIUM CHLORIDE, CALCIUM CHLORIDE 600; 310; 30; 20 MG/100ML; MG/100ML; MG/100ML; MG/100ML
30 INJECTION, SOLUTION INTRAVENOUS CONTINUOUS PRN
Status: DISCONTINUED | OUTPATIENT
Start: 2018-12-03 | End: 2018-12-03 | Stop reason: HOSPADM

## 2018-12-03 RX ORDER — PROPOFOL 10 MG/ML
VIAL (ML) INTRAVENOUS CONTINUOUS PRN
Status: DISCONTINUED | OUTPATIENT
Start: 2018-12-03 | End: 2018-12-03 | Stop reason: SURG

## 2018-12-03 RX ORDER — LIDOCAINE HYDROCHLORIDE 20 MG/ML
INJECTION, SOLUTION INFILTRATION; PERINEURAL AS NEEDED
Status: DISCONTINUED | OUTPATIENT
Start: 2018-12-03 | End: 2018-12-03 | Stop reason: SURG

## 2018-12-03 RX ADMIN — SODIUM CHLORIDE, POTASSIUM CHLORIDE, SODIUM LACTATE AND CALCIUM CHLORIDE 30 ML/HR: 600; 310; 30; 20 INJECTION, SOLUTION INTRAVENOUS at 13:24

## 2018-12-03 RX ADMIN — LIDOCAINE HYDROCHLORIDE 60 MG: 20 INJECTION, SOLUTION INFILTRATION; PERINEURAL at 14:00

## 2018-12-03 RX ADMIN — PROPOFOL 100 MCG/KG/MIN: 10 INJECTION, EMULSION INTRAVENOUS at 14:00

## 2018-12-03 RX ADMIN — PROPOFOL 100 MG: 10 INJECTION, EMULSION INTRAVENOUS at 14:00

## 2018-12-03 NOTE — ANESTHESIA PREPROCEDURE EVALUATION
Anesthesia Evaluation     Patient summary reviewed and Nursing notes reviewed                Airway   Dental      Pulmonary - negative pulmonary ROS   Cardiovascular     (+) hyperlipidemia,       Neuro/Psych  (+) psychiatric history Anxiety and Depression,     GI/Hepatic/Renal/Endo - negative ROS     Musculoskeletal (-) negative ROS    Abdominal    Substance History - negative use     OB/GYN negative ob/gyn ROS   (-)  Pregnant        Other                        Anesthesia Plan    ASA 2     MAC     Anesthetic plan, all risks, benefits, and alternatives have been provided, discussed and informed consent has been obtained with: patient.

## 2018-12-03 NOTE — DISCHARGE INSTRUCTIONS
WHAT ARE DIVERTICULOSIS AND DIVERTICULITIS?  Many people have small pouches in their colons that bulge outward through weak spots, like an inner tube that pokes through weak places in a tire.  Each pouch is called a diverticulum.  The condition of having diverticula is DIVERTICULOSIS.  The condition becomes more common as people age.  About half of all people over the age of 60 have diverticulosis    When pouches become infected or inflamed, the condition is called DIVERTICULITIS.  This happens in 10% to 25% of people with diverticulosis.  Diverticulosis and diverticulitis are also called DIVERTICULAR DISEASE.     WHAT ARE THE SYMPTOMS?  Diverticulosis - Most people do not have any discomfort or symptoms.  However, symptoms may include mild cramps, bloating, and constipation.  Other diseases such as irritable bowel syndrome (IBS) and stomach ulcers cause similar problems, so these symptoms do not always mean a person has diverticulosis.  You should visit your doctor if you have these troubling symptoms.    Diverticulitis - The most common symptom is abdominal pain.  The most common sign is tenderness around the left side of the lower abdomen.  If infection is the cause, fever, nausea, vomiting, chills, cramping, and constipation may occur as well.  The severity depends on the extent of the infection and complications.    WHAT ARE THE COMPLICATIONS?  Diverticulitis can lead to bleeding, infections,perforations or tears, or blockages.  These complications always require treatment to prevent them from proggressing and causing serous illness.    Bleeding from a diverticula is a rare complication.  When this occurs, blood may appear in the toilet or in your stool.  Bleeding can be severe, but it may stop by itself and not require treatment.  Doctors believe bleeding diverticula are caused by a small blood vessel in a diverticulum that weakens and finally bursts.  If you have bleeding from the rectum, you should see your  doctor.  If the bleeding does not stop you may need surgery.    Abscess, Perforation, and Peritonitis - The infection causing diverticulitis often clears up after a few days of treatment with antibiotics.  If the condition gets worse, an abscess may form in the colon.  An abscess is an infected area with pus that may cause swelling and destroy tissue.  Sometimes the infected diverticula may develop small holes, called perforations.  These perforations allow pus to leak out of the colon into the abdominal area.  If the abscess is small and remains in the colon, it may clear up after treatment with antibiotics.  If not, the doctor may need to drain it.  A large abscess can become a serious problem if the infection leaks out and contaminates areas outside the colon.  Infection that spreads into the abdominal cavity is called peritonitis.  Peritonitis requires immediate surgery toclean the abdominal cavity and remove the damaged part of the colon.  Without surgery, peritonitis can be fatal.    FISTULA  A fistula is an abnormal connection of tissue between two organs or between an organ and the skin.  When damaged tissues come into contact with each other during infection, they sometimes stick together.  If they heal that way, a fistula forms.  When diverticulitis-related infection spreads through out the colon, the colon's tissue may stick to nearby tissues.  The organs usually involved are the bladder, small intestine, and skin.  The problem can be corrected with surgery to remove the fistula and affected part of the colon.    INTESTINAL OBSTRUCTION  The scarring caused by infection may cause partial or total blockage of the large intestine.  When this happens, the colon is unable to move bowel contents normally.  When the obstruction totally blocks the intestine, emergency surgery is necessary.  Partial blockage is not an emergency, so the surgery to correct it can be planned.    WHAT CAUSES DIVERTICULAR  DISEASE  Although not proven, the dominant theory is that a low-fiber diet is the main cause of diverticular disease.  The disease was first noticed in the [place] in the early 1900s.  At about the same time, processed foods were introduced into the American diet.  Many processed foods contain refined, low-fiber flour.  Unlike whole-wheat flour, refined flour has no wheat bran.    Diverticular disease is common in developed or industrialized countries-particularly the [place], [place], and [place]-where low-fiber diets are common.  The disease is rare in countries of [place] and [place], where people eat high-fiber vegetable diets.    Fiber is the part of fruits, vegetables, and whole grains that the body cannot digest.  Some fiber dissolves easily in water (soluble fiber).  It takes on a soft, jelly-like texture in the intestines.  Some fiber passes almost unchanged through the intestines (insoluble fiber).  Both kinds of fiber help make stools soft and easy to pass.  Fiber also prevents constipation.    Constipation makes the muscles strain to move stool that is too hard.  It is the main cause of increased pressure in the colon.  This excess pressure might cause the weak spots in the colon to bulge out and become diverticula.  Diverticulitis occurs when diverticula become infected or inflamed.  Doctors are not certain what causes the infection.  It may begin when stool or bacteria are caught in the diverticula.  An attack of diverticulitis can develop suddenly and without warning.    HOW DOES THE DOCTOR DIAGNOSE DIVERTICULAR DISEASE  The doctor asks about medical history, does a physical exam, and may perform one or more diagnostic tests.  Because most people do not have symptoms, diverticulosis is often found through tests ordered for another ailment.    When taking a medical history, the doctor may ask about bowel habits, symptoms, pain, diet, and medications.  The physical exam usually involves a  digital rectal exam.  To preform this test. The doctor inserts a gloved, lubricated finger into the rectum to detect tenderness, blockage, or blood.  The doctor may check stool for signs of bleeding and test blood for signs of infection.  The doctor may also order x-rays or other tests.    WHAT IS THE TREATMENT FOR DIVERTICULAR DISEASE  Increasing the amount of fiber in the diet may reduce symptoms of diverticulosis and prevent complications such as diverticulitis.  Fiber keeps stool soft and lowers pressure inside the colon so that bowel contents can move through easily.  The American Dietetic Association. Recommends 20 to 35 grams of fiber each day.  The doctor may also recommend taking a fiber product such as Citrucel or Metamucil once a dya.  These products are mixed water and provide about 2 to 3.5 grams of fiber per  Tablespoon, mixed with 8 ounces of water.    Avoidance of nuts, popcorn, and sunflower, pumpkin, denton, and sesame seeds has been recommended by physicians out of fear that food particles could enter, block, or irritate the diverticula.  However, no scientific data support this treatment measure.  Eating a high-fiber diet is the only requirement highly emphasized across the medical literature.  Eliminating specific foods is not necessary.  The seeds in tomatoes, zucchini, cucumbers, strawberries, and raspberries, as well as poppy seeds, are generally considered harmless.  People differ in amounts and types of foods the can eat.  Decisions about diet should be made based on what works best for each person.  Keeping a food diary may help identify what foods may cause symptoms.    If cramps, bloating, and constipation are problems, the doctor may prescribe  Short course of pain medication.  However, many medications affect emptying of the colon, an undesirable side effect for people with diverticulosis.    DIVERTICULITIS  Treatment focuses on clearing up the infection and inflammation, resting the  colon, and preventing or minimizing complications.  An attack of diverticulitis without complications may respond to antibiotics within a few days if treated early.  To help the colon rest, the doctor may recommend bed rest and a liquid diet, along with a pain reliever.    An acute attack with severe pain or sever infection may require a hospital stay.  Most acute cases of diverticulitis are treated with antibiotics and a liquid diet.  The antibiotics are given by injection into a vein.  In some cases, however, surgery may be necessary.    WHEN IS SURGERY NECESSARY  If attacks are severe or frequent, the doctor may advise surgery.  The surgeon removes the affected part of the colon and joins the remaining sections.  This typed of surgery, called colon resection, aims to keep attacks from coming back and to prevent complications.  The doctor may also recommend surgery for complications of a fistula or intestinal obstruction.    If antibiotics do not correct an attack, emergency surgery may be required.  Other reasons for emergency surgery include a large abscess, perforation, peritonitis, or continued bleeding.    Emergency surgery usually involves 2 operations.  The first will clear the infected abdominal cavity and remove part of the colon.  Because infection and sometimes obstruction, it is not safe to rejoin the colon during the first operation.  Instead, the surgeon creates a temporary hole, or stoma, in the abdomen.  The end of the colon is connected to the hole, a procedure called a colostomy, to allow normal eating and bowel movements.  The stool goes into a bag attached to the opening in the abdomen.  In the second operation, the surgeon rejoins the ends of the colon.

## 2018-12-03 NOTE — H&P
"Chief Complaint   Patient presents with   • Diarrhea         History of Present Illness: 57 yo female who c/o a one month h/o diarrhea that started about 6 weeks ago and has now stopped. She has bouts of intermittent diarrhea. This bout 6 weeks ago lasted longer than usual.  NO rectal bleeding or melena. Occasional constipation. \"I have always had a messed up stomach\". She has periumbilical and left sideed abdominal pain intermittently that is worse when she has diarrhea. The dicyclomine helps. Some nausea, no vomiting. Occasional heartburn. Weight stable. No foreign travel. Lasst antibiotics: 1 yr ago. No new meds.  We reviewed 8/18 labs done. Brother had esophageal cancer.      Medical History        Past Medical History:   Diagnosis Date   • Anxiety     • ASCUS of cervix with negative high risk HPV 09/2001   • ASCUS of cervix with negative high risk HPV 09/2005   • Depression     • Essential hematuria     • Gait disturbance       R foot drop   • Hyperlipidemia     • Postmenopausal     • Vaginal delivery              Surgical History         Past Surgical History:   Procedure Laterality Date   • ANKLE SURGERY       • BACK SURGERY       • COLONOSCOPY   10/22/2013     tics, [facility], [facility]   • LAPAROSCOPIC TUBAL LIGATION Bilateral     • TEAR DUCT SURGERY   06/2017               Current Outpatient Prescriptions:   •  CRESTOR 10 MG tablet, TAKE ONE TABLET BY MOUTH DAILY, Disp: 30 tablet, Rfl: 2  •  dicyclomine (BENTYL) 10 MG capsule, Take 1 capsule by mouth 4 (Four) Times a Day Before Meals & at Bedtime., Disp: 30 capsule, Rfl: 2  •  hydrocortisone butyrate (LOCOID) 0.1 % ointment ointment, Apply  topically to the appropriate area as directed Every 12 (Twelve) Hours., Disp: 45 g, Rfl: 1  •  mupirocin (BACTROBAN) 2 % ointment, Apply  topically 3 (Three) Times a Day., Disp: 15 g, Rfl: 0  •  sertraline (ZOLOFT) 100 MG tablet, Take  by mouth daily., Disp: , Rfl:      No Known Allergies           Family History   Problem Relation " Age of Onset   • COPD Mother     • Heart disease Father     • Heart disease Brother     • Cancer Brother     • COPD Brother     • Breast cancer Sister     • No Known Problems Daughter     • No Known Problems Son     • No Known Problems Maternal Grandmother     • No Known Problems Paternal Grandmother     • No Known Problems Maternal Aunt     • No Known Problems Paternal Aunt     • BRCA 1/2 Neg Hx     • Colon cancer Neg Hx     • Endometrial cancer Neg Hx     • Ovarian cancer Neg Hx           Social History               Social History   • Marital status:        Spouse name: N/A   • Number of children: N/A   • Years of education: N/A          Occupational History   • Not on file.             Social History Main Topics   • Smoking status: Never Smoker   • Smokeless tobacco: Never Used   • Alcohol use Yes         Comment: 2 x weekly   • Drug use: No   • Sexual activity: Yes       Birth control/ protection: Post-menopausal           Other Topics Concern   • Not on file          Social History Narrative   • No narrative on file            Review of Systems   Constitutional: Negative for fever.   Gastrointestinal: Positive for abdominal pain, constipation, diarrhea and nausea. Negative for vomiting.   Genitourinary: Negative for dysuria, frequency and hematuria.   Musculoskeletal: Negative for arthralgias and myalgias.   Neurological: Negative for headaches.   All other systems reviewed and are negative.            Vitals:     10/17/18 0834   BP: 128/76   Temp: 98.6 °F (37 °C)         Physical Exam   Constitutional: She is oriented to person, place, and time. She appears well-developed and well-nourished. No distress.   HENT:   Head: Normocephalic and atraumatic. Hair is normal.   Right Ear: Hearing, tympanic membrane, external ear and ear canal normal. No drainage. No decreased hearing is noted.   Left Ear: Hearing, tympanic membrane, external ear and ear canal normal. No decreased hearing is noted.   Nose: No  nasal deformity.   Mouth/Throat: Oropharynx is clear and moist.   Eyes: Pupils are equal, round, and reactive to light. Conjunctivae, EOM and lids are normal. Right eye exhibits no discharge. Left eye exhibits no discharge.   Neck: Normal range of motion. Neck supple. No JVD present. No tracheal deviation present. No thyromegaly present.   Cardiovascular: Normal rate, regular rhythm, normal heart sounds, intact distal pulses and normal pulses.  Exam reveals no gallop and no friction rub.    No murmur heard.  Pulmonary/Chest: Effort normal and breath sounds normal. No respiratory distress. She has no wheezes. She has no rales. She exhibits no tenderness.   Abdominal: Soft. Bowel sounds are normal. She exhibits no distension and no mass. There is no tenderness. There is no rebound and no guarding. No hernia.   Musculoskeletal: Normal range of motion. She exhibits no edema, tenderness or deformity.   Lymphadenopathy:     She has no cervical adenopathy.   Neurological: She is alert and oriented to person, place, and time. She has normal reflexes. She displays normal reflexes. No cranial nerve deficit. She exhibits normal muscle tone. Coordination normal.   Skin: Skin is warm and dry. No rash noted. She is not diaphoretic. No erythema.   Psychiatric: She has a normal mood and affect. Her behavior is normal. Judgment and thought content normal.   Vitals reviewed.        Brandy was seen today for diarrhea.     Diagnoses and all orders for this visit:     Change in bowel habits  -     Case Request; Standing  -     Case Request     Dyspepsia  -     Case Request; Standing  -     Case Request     FH: esophageal cancer  -     Case Request; Standing  -     Case Request     Diarrhea, unspecified type     Other orders  -     Follow Anesthesia Guidelines / Standing Orders; Future  -     Implement Anesthesia orders day of procedure.; Standing  -     Obtain informed consent; Standing  -     Verify bowel prep was successful;  Standing  -     Give tap water enema if bowel prep was insufficient; Standing        Assessment:  1) Episodes of diarrhea.   2) Abdominal pain.  3) Dyspepsia  4) FH (bro) esophageal cancer     Recommendations:  1) EGD and colonoscopy        No Follow-up on file.     12/3/18 No change from the above Maggi Emerson MD

## 2018-12-04 LAB
CYTO UR: NORMAL
LAB AP CASE REPORT: NORMAL
PATH REPORT.FINAL DX SPEC: NORMAL
PATH REPORT.GROSS SPEC: NORMAL
UREASE TISS QL: NEGATIVE

## 2019-01-02 ENCOUNTER — TELEPHONE (OUTPATIENT)
Dept: GASTROENTEROLOGY | Facility: CLINIC | Age: 59
End: 2019-01-02

## 2019-01-02 NOTE — TELEPHONE ENCOUNTER
Message   Received: Today   Message Contents   [facility]             PATIENT CALLED BACK. I WENT OVER HER PATH RESULTS. SHE VERY UNDERSTANDING.

## 2019-01-02 NOTE — TELEPHONE ENCOUNTER
----- Message from Syd Emerson MD sent at 12/30/2018  4:56 PM EST -----  Tell her that pathology from the EGD and colonoscopy done earlier this month showed that there was minimal chronic inflammation in the stomach but no evidence of Helicobacter pylori.  The colon polyp that was removed was not cancerous but was considered precancerous.  I would recommend that she have a repeat colonoscopy in 5 years.  The other colon biopsies did not show any evidence of inflammation or colitis, which is good.. Thx. kjh

## 2019-02-19 ENCOUNTER — OFFICE VISIT (OUTPATIENT)
Dept: INTERNAL MEDICINE | Facility: CLINIC | Age: 59
End: 2019-02-19

## 2019-02-19 VITALS
HEART RATE: 99 BPM | BODY MASS INDEX: 29.76 KG/M2 | WEIGHT: 168 LBS | SYSTOLIC BLOOD PRESSURE: 140 MMHG | OXYGEN SATURATION: 99 % | TEMPERATURE: 100.6 F | DIASTOLIC BLOOD PRESSURE: 80 MMHG

## 2019-02-19 DIAGNOSIS — J40 BRONCHITIS: ICD-10-CM

## 2019-02-19 DIAGNOSIS — R50.9 FEVER, UNSPECIFIED FEVER CAUSE: Primary | ICD-10-CM

## 2019-02-19 DIAGNOSIS — R53.83 FATIGUE, UNSPECIFIED TYPE: ICD-10-CM

## 2019-02-19 LAB
EXPIRATION DATE: NORMAL
FLUAV AG NPH QL: NEGATIVE
FLUBV AG NPH QL: NEGATIVE
INTERNAL CONTROL: NORMAL
Lab: NORMAL

## 2019-02-19 PROCEDURE — 99213 OFFICE O/P EST LOW 20 MIN: CPT | Performed by: INTERNAL MEDICINE

## 2019-02-19 PROCEDURE — 87804 INFLUENZA ASSAY W/OPTIC: CPT | Performed by: INTERNAL MEDICINE

## 2019-02-19 RX ORDER — GUAIFENESIN AND CODEINE PHOSPHATE 100; 10 MG/5ML; MG/5ML
5 SOLUTION ORAL 3 TIMES DAILY PRN
Qty: 150 ML | Refills: 0 | Status: SHIPPED | OUTPATIENT
Start: 2019-02-19 | End: 2019-05-06

## 2019-02-19 RX ORDER — OSELTAMIVIR PHOSPHATE 75 MG/1
75 CAPSULE ORAL 2 TIMES DAILY
Qty: 10 CAPSULE | Refills: 0 | Status: SHIPPED | OUTPATIENT
Start: 2019-02-19 | End: 2019-05-06

## 2019-02-19 RX ORDER — GUAIFENESIN AND CODEINE PHOSPHATE 100; 10 MG/5ML; MG/5ML
5 SOLUTION ORAL 3 TIMES DAILY PRN
Qty: 118 ML | Refills: 0 | Status: SHIPPED | OUTPATIENT
Start: 2019-02-19 | End: 2019-02-19 | Stop reason: SDUPTHER

## 2019-02-19 RX ORDER — AZITHROMYCIN 250 MG/1
TABLET, FILM COATED ORAL
Qty: 6 TABLET | Refills: 0 | Status: SHIPPED | OUTPATIENT
Start: 2019-02-19 | End: 2019-05-06

## 2019-02-19 RX ORDER — SACCHAROMYCES BOULARDII 250 MG
250 CAPSULE ORAL 2 TIMES DAILY
Qty: 20 CAPSULE | Refills: 0 | Status: SHIPPED | OUTPATIENT
Start: 2019-02-19 | End: 2019-05-06

## 2019-02-19 NOTE — PROGRESS NOTES
Chief Complaint   Patient presents with   • Cough     this morning   • Fever     101       History of Present Illness   Brandy El is a 58 y.o. female presents for acute care. Exposed to flu and pneumonia over the weekend. Today w/ cough, fever, fatigue.       The following portions of the patient's history were reviewed and updated as appropriate: allergies, current medications, past family history, past medical history, past social history, past surgical history and problem list.  Current Outpatient Medications on File Prior to Visit   Medication Sig Dispense Refill   • CRESTOR 10 MG tablet Take 1 tablet by mouth Daily. 90 tablet 3   • sertraline (ZOLOFT) 100 MG tablet Take  by mouth daily.     • [DISCONTINUED] mupirocin (BACTROBAN) 2 % ointment Apply  topically 3 (Three) Times a Day. 15 g 0   • [DISCONTINUED] dicyclomine (BENTYL) 10 MG capsule Take 1 capsule by mouth 4 (Four) Times a Day Before Meals & at Bedtime. 30 capsule 2   • [DISCONTINUED] hydrocortisone butyrate (LOCOID) 0.1 % ointment ointment Apply  topically to the appropriate area as directed Every 12 (Twelve) Hours. 45 g 1     No current facility-administered medications on file prior to visit.      Review of Systems   Constitutional: Positive for fatigue and fever.   HENT: Positive for sinus pressure, sinus pain and sore throat.    Eyes: Negative.    Respiratory: Positive for cough.    Cardiovascular: Negative.    Gastrointestinal: Negative.    Endocrine: Negative.    Genitourinary: Negative.    Musculoskeletal: Negative.    Skin: Negative.    Allergic/Immunologic: Negative.    Neurological: Positive for headaches.   Hematological: Negative.    Psychiatric/Behavioral: Negative.        Objective   Physical Exam   Constitutional: She is oriented to person, place, and time. She appears well-developed and well-nourished.   Ill appearing. No acute distress   HENT:   Head: Normocephalic and atraumatic.   Right Ear: External ear normal.   Left Ear:  External ear normal.   Pharyngeal erythema   Eyes: Conjunctivae and EOM are normal. Pupils are equal, round, and reactive to light.   Neck: Normal range of motion. Neck supple.   Cardiovascular: Normal rate, regular rhythm, normal heart sounds and intact distal pulses.   Pulmonary/Chest: Effort normal and breath sounds normal.   Abdominal: Soft.   Neurological: She is alert and oriented to person, place, and time.   Skin: Skin is warm and dry.   Psychiatric: She has a normal mood and affect. Her behavior is normal. Judgment and thought content normal.   Nursing note and vitals reviewed.       /80   Pulse 99   Temp (!) 100.6 °F (38.1 °C)   Wt 76.2 kg (168 lb)   SpO2 99%   BMI 29.76 kg/m²     Assessment/Plan   Diagnoses and all orders for this visit:    Fever, unspecified fever cause  -     POC Influenza A / B  -     Respiratory Panel, PCR - Swab, Nasopharynx; Future    Fatigue, unspecified type  -     Respiratory Panel, PCR - Swab, Nasopharynx; Future    Bronchitis    Other orders  -     oseltamivir (TAMIFLU) 75 MG capsule; Take 1 capsule by mouth 2 (Two) Times a Day.  -     azithromycin (ZITHROMAX) 250 MG tablet; Take 2 tablets the first day, then 1 tablet daily for 4 days.  -     guaifenesin-codeine (GUAIFENESIN AC) 100-10 MG/5ML liquid; Take 5 mL by mouth 3 (Three) Times a Day As Needed for Cough.  -     saccharomyces boulardii (FLORASTOR) 250 MG capsule; Take 1 capsule by mouth 2 (Two) Times a Day.      Bronchitis. ? If viral or bacterial. Classical flu symptoms w/ neg screen. Will check respiratory panel. Given exposure to both bacteria and virus will double cover today and alter med based on panel result. florastor and cheratussin as well as tylenol for symptoms relief.

## 2019-02-22 ENCOUNTER — TELEPHONE (OUTPATIENT)
Dept: INTERNAL MEDICINE | Facility: CLINIC | Age: 59
End: 2019-02-22

## 2019-02-22 LAB
B PERT.PT PRMT NPH QL NAA+NON-PROBE: NOT DETECTED
C PNEUM DNA NPH QL NAA+NON-PROBE: NOT DETECTED
FLUAV H1 2009 PAN RNA NPH NAA+NON-PROBE: NOT DETECTED
FLUAV H1 RNA NPH QL NAA+NON-PROBE: NOT DETECTED
FLUAV H3 RNA NPH QL NAA+NON-PROBE: NOT DETECTED
FLUAV RNA NPH QL NAA+NON-PROBE: NOT DETECTED
FLUBV RNA NPH QL NAA+NON-PROBE: NOT DETECTED
HADV DNA NPH QL NAA+NON-PROBE: NOT DETECTED
HCOV 229E RNA NPH QL NAA+NON-PROBE: NOT DETECTED
HCOV HKU1 RNA NPH QL NAA+NON-PROBE: NOT DETECTED
HCOV NL63 RNA NPH QL NAA+NON-PROBE: NOT DETECTED
HCOV OC43 RNA NPH QL NAA+NON-PROBE: NOT DETECTED
HMPV RNA NPH QL NAA+NON-PROBE: NOT DETECTED
HPIV1 RNA NPH QL NAA+NON-PROBE: NOT DETECTED
HPIV2 RNA NPH QL NAA+NON-PROBE: NOT DETECTED
HPIV3 RNA NPH QL NAA+NON-PROBE: NOT DETECTED
HPIV4 RNA NPH QL NAA+NON-PROBE: NOT DETECTED
M PNEUMO DNA NPH QL NAA+NON-PROBE: NOT DETECTED
RSV RNA NPH QL NAA+NON-PROBE: NOT DETECTED
RV+EV RNA NPH QL NAA+NON-PROBE: NOT DETECTED

## 2019-02-22 NOTE — TELEPHONE ENCOUNTER
Per Dr. Woodard nasal swab is normal.  If still taking Tamiflu please discontinue but finish Zpak.  Patient has been notified.

## 2019-05-06 ENCOUNTER — OFFICE VISIT (OUTPATIENT)
Dept: INTERNAL MEDICINE | Facility: CLINIC | Age: 59
End: 2019-05-06

## 2019-05-06 VITALS
DIASTOLIC BLOOD PRESSURE: 78 MMHG | SYSTOLIC BLOOD PRESSURE: 136 MMHG | HEART RATE: 63 BPM | WEIGHT: 172 LBS | OXYGEN SATURATION: 98 % | BODY MASS INDEX: 30.47 KG/M2

## 2019-05-06 DIAGNOSIS — M72.2 PLANTAR FASCIITIS OF RIGHT FOOT: Primary | ICD-10-CM

## 2019-05-06 PROCEDURE — 99213 OFFICE O/P EST LOW 20 MIN: CPT | Performed by: INTERNAL MEDICINE

## 2019-05-06 NOTE — PROGRESS NOTES
"Chief Complaint   Patient presents with   • Foot Pain     left heel       History of Present Illness   Brandy El is a 58 y.o. female presents for acute care. She is having acute left foot pain. Arch and heel are source of pain. Painful throughout the day. Some improvement in morning then hurts when using again. She has tried rest, ibuprofen, and ice for it with no real benefit. Noticed it initially about 2 months ago but it has been \"really bad\" for last 1 month. She followed w/ dr. Mg in the past for her right foot issues.     The following portions of the patient's history were reviewed and updated as appropriate: allergies, current medications, past family history, past medical history, past social history, past surgical history and problem list.  Current Outpatient Medications on File Prior to Visit   Medication Sig Dispense Refill   • CRESTOR 10 MG tablet Take 1 tablet by mouth Daily. 90 tablet 3   • sertraline (ZOLOFT) 100 MG tablet Take  by mouth daily.     • [DISCONTINUED] azithromycin (ZITHROMAX) 250 MG tablet Take 2 tablets the first day, then 1 tablet daily for 4 days. 6 tablet 0   • [DISCONTINUED] guaifenesin-codeine (GUAIFENESIN AC) 100-10 MG/5ML liquid Take 5 mL by mouth 3 (Three) Times a Day As Needed for Cough. 150 mL 0   • [DISCONTINUED] oseltamivir (TAMIFLU) 75 MG capsule Take 1 capsule by mouth 2 (Two) Times a Day. 10 capsule 0   • [DISCONTINUED] saccharomyces boulardii (FLORASTOR) 250 MG capsule Take 1 capsule by mouth 2 (Two) Times a Day. 20 capsule 0     No current facility-administered medications on file prior to visit.      Review of Systems   Constitutional: Negative.    HENT: Negative.    Eyes: Negative.    Respiratory: Negative.    Cardiovascular: Negative.    Gastrointestinal: Negative.    Endocrine: Negative.    Genitourinary: Negative.    Musculoskeletal:        Foot pain   Skin: Negative.    Allergic/Immunologic: Negative.    Neurological: Negative.    Hematological: Negative.  "   Psychiatric/Behavioral: Negative.        Objective   Physical Exam   Constitutional: She is oriented to person, place, and time. She appears well-developed and well-nourished.   HENT:   Head: Normocephalic and atraumatic.   Right Ear: External ear normal.   Left Ear: External ear normal.   Nose: Nose normal.   Mouth/Throat: Oropharynx is clear and moist.   Eyes: EOM are normal. Pupils are equal, round, and reactive to light.   Neck: Normal range of motion. Neck supple.   Cardiovascular: Normal rate, regular rhythm, normal heart sounds and intact distal pulses.   Pulmonary/Chest: Effort normal and breath sounds normal.   Abdominal: Soft. Bowel sounds are normal.   Musculoskeletal:   Plantar suface  to palpation.    Neurological: She is alert and oriented to person, place, and time.   Skin: Skin is warm and dry.   Psychiatric: She has a normal mood and affect. Her behavior is normal. Judgment and thought content normal.   Nursing note and vitals reviewed.       /78   Pulse 63   Wt 78 kg (172 lb)   SpO2 98%   BMI 30.47 kg/m²     Assessment/Plan   Diagnoses and all orders for this visit:    Plantar fasciitis of right foot  -     Ambulatory Referral to Podiatry    Other orders  -     diclofenac (VOLTAREN) 1 % gel gel; Apply 4 g topically to the appropriate area as directed 4 (Four) Times a Day As Needed (foot pain).      Patient w/ acute plantar fasciitis. She will start voltaren gel to the area tid as needed. She is advised to reduce oral nsaid. Ice area. Stretch. Refer to podiatry and physical therapy. Arch support w/ powerstep or others.

## 2019-08-08 DIAGNOSIS — E78.5 HYPERLIPIDEMIA, UNSPECIFIED HYPERLIPIDEMIA TYPE: Primary | ICD-10-CM

## 2019-08-08 DIAGNOSIS — Z00.00 HEALTH CARE MAINTENANCE: ICD-10-CM

## 2019-08-17 LAB
ALBUMIN SERPL-MCNC: 4.5 G/DL (ref 3.5–5.2)
ALBUMIN/GLOB SERPL: 2 G/DL
ALP SERPL-CCNC: 52 U/L (ref 39–117)
ALT SERPL-CCNC: 19 U/L (ref 1–33)
APPEARANCE UR: CLEAR
AST SERPL-CCNC: 16 U/L (ref 1–32)
BACTERIA #/AREA URNS HPF: ABNORMAL /HPF
BACTERIA UR CULT: ABNORMAL
BACTERIA UR CULT: ABNORMAL
BASOPHILS # BLD AUTO: 0.04 10*3/MM3 (ref 0–0.2)
BASOPHILS NFR BLD AUTO: 0.9 % (ref 0–1.5)
BILIRUB SERPL-MCNC: 0.5 MG/DL (ref 0.2–1.2)
BILIRUB UR QL STRIP: NEGATIVE
BUN SERPL-MCNC: 14 MG/DL (ref 6–20)
BUN/CREAT SERPL: 18.4 (ref 7–25)
CALCIUM SERPL-MCNC: 9.1 MG/DL (ref 8.6–10.5)
CHLORIDE SERPL-SCNC: 106 MMOL/L (ref 98–107)
CHOLEST SERPL-MCNC: 168 MG/DL (ref 0–200)
CO2 SERPL-SCNC: 28.1 MMOL/L (ref 22–29)
COLOR UR: YELLOW
CREAT SERPL-MCNC: 0.76 MG/DL (ref 0.57–1)
CRYSTALS URNS MICRO: ABNORMAL
EOSINOPHIL # BLD AUTO: 0.11 10*3/MM3 (ref 0–0.4)
EOSINOPHIL NFR BLD AUTO: 2.5 % (ref 0.3–6.2)
EPI CELLS #/AREA URNS HPF: ABNORMAL /HPF
ERYTHROCYTE [DISTWIDTH] IN BLOOD BY AUTOMATED COUNT: 12.9 % (ref 12.3–15.4)
GLOBULIN SER CALC-MCNC: 2.2 GM/DL
GLUCOSE SERPL-MCNC: 104 MG/DL (ref 65–99)
GLUCOSE UR QL: NEGATIVE
HCT VFR BLD AUTO: 42.1 % (ref 34–46.6)
HDLC SERPL-MCNC: 41 MG/DL (ref 40–60)
HGB BLD-MCNC: 13.3 G/DL (ref 12–15.9)
HGB UR QL STRIP: ABNORMAL
IMM GRANULOCYTES # BLD AUTO: 0.02 10*3/MM3 (ref 0–0.05)
IMM GRANULOCYTES NFR BLD AUTO: 0.5 % (ref 0–0.5)
KETONES UR QL STRIP: NEGATIVE
LDLC SERPL CALC-MCNC: 101 MG/DL (ref 0–100)
LEUKOCYTE ESTERASE UR QL STRIP: ABNORMAL
LYMPHOCYTES # BLD AUTO: 1.1 10*3/MM3 (ref 0.7–3.1)
LYMPHOCYTES NFR BLD AUTO: 25.3 % (ref 19.6–45.3)
MCH RBC QN AUTO: 30 PG (ref 26.6–33)
MCHC RBC AUTO-ENTMCNC: 31.6 G/DL (ref 31.5–35.7)
MCV RBC AUTO: 95 FL (ref 79–97)
MICRO URNS: ABNORMAL
MONOCYTES # BLD AUTO: 0.38 10*3/MM3 (ref 0.1–0.9)
MONOCYTES NFR BLD AUTO: 8.7 % (ref 5–12)
MUCOUS THREADS URNS QL MICRO: PRESENT /HPF
NEUTROPHILS # BLD AUTO: 2.7 10*3/MM3 (ref 1.7–7)
NEUTROPHILS NFR BLD AUTO: 62.1 % (ref 42.7–76)
NITRITE UR QL STRIP: NEGATIVE
NRBC BLD AUTO-RTO: 0 /100 WBC (ref 0–0.2)
OTHER ANTIBIOTIC SUSC ISLT: ABNORMAL
PH UR STRIP: 5 [PH] (ref 5–7.5)
PLATELET # BLD AUTO: 215 10*3/MM3 (ref 140–450)
POTASSIUM SERPL-SCNC: 4.4 MMOL/L (ref 3.5–5.2)
PROT SERPL-MCNC: 6.7 G/DL (ref 6–8.5)
PROT UR QL STRIP: NEGATIVE
RBC # BLD AUTO: 4.43 10*6/MM3 (ref 3.77–5.28)
RBC #/AREA URNS HPF: ABNORMAL /HPF
SODIUM SERPL-SCNC: 144 MMOL/L (ref 136–145)
SP GR UR: 1.02 (ref 1–1.03)
TRIGL SERPL-MCNC: 129 MG/DL (ref 0–150)
TSH SERPL DL<=0.005 MIU/L-ACNC: 1.82 MIU/ML (ref 0.27–4.2)
UNIDENT CRYS URNS QL MICRO: PRESENT /LPF
URINALYSIS REFLEX: ABNORMAL
UROBILINOGEN UR STRIP-MCNC: 0.2 MG/DL (ref 0.2–1)
VLDLC SERPL CALC-MCNC: 25.8 MG/DL
WBC # BLD AUTO: 4.35 10*3/MM3 (ref 3.4–10.8)
WBC #/AREA URNS HPF: ABNORMAL /HPF

## 2019-08-19 ENCOUNTER — OFFICE VISIT (OUTPATIENT)
Dept: INTERNAL MEDICINE | Facility: CLINIC | Age: 59
End: 2019-08-19

## 2019-08-19 VITALS
BODY MASS INDEX: 29.95 KG/M2 | DIASTOLIC BLOOD PRESSURE: 80 MMHG | OXYGEN SATURATION: 97 % | HEART RATE: 80 BPM | WEIGHT: 169 LBS | HEIGHT: 63 IN | SYSTOLIC BLOOD PRESSURE: 114 MMHG

## 2019-08-19 DIAGNOSIS — Z78.0 POSTMENOPAUSAL: Primary | ICD-10-CM

## 2019-08-19 DIAGNOSIS — E78.5 HYPERLIPIDEMIA, UNSPECIFIED HYPERLIPIDEMIA TYPE: ICD-10-CM

## 2019-08-19 DIAGNOSIS — Z12.31 BREAST CANCER SCREENING BY MAMMOGRAM: ICD-10-CM

## 2019-08-19 DIAGNOSIS — Z12.4 PAP SMEAR FOR CERVICAL CANCER SCREENING: ICD-10-CM

## 2019-08-19 DIAGNOSIS — Z00.00 HEALTHCARE MAINTENANCE: ICD-10-CM

## 2019-08-19 PROCEDURE — 90471 IMMUNIZATION ADMIN: CPT | Performed by: INTERNAL MEDICINE

## 2019-08-19 PROCEDURE — 99396 PREV VISIT EST AGE 40-64: CPT | Performed by: INTERNAL MEDICINE

## 2019-08-19 PROCEDURE — 90632 HEPA VACCINE ADULT IM: CPT | Performed by: INTERNAL MEDICINE

## 2019-08-19 PROCEDURE — 93000 ELECTROCARDIOGRAM COMPLETE: CPT | Performed by: INTERNAL MEDICINE

## 2019-08-19 PROCEDURE — 77080 DXA BONE DENSITY AXIAL: CPT | Performed by: INTERNAL MEDICINE

## 2019-08-19 RX ORDER — NEOMYCIN SULFATE, POLYMYXIN B SULFATE, HYDROCORTISONE 3.5; 10000; 1 MG/ML; [USP'U]/ML; MG/ML
3 SOLUTION/ DROPS AURICULAR (OTIC) 4 TIMES DAILY
Qty: 10 ML | Refills: 1 | OUTPATIENT
Start: 2019-08-19 | End: 2021-03-16

## 2019-08-19 NOTE — PROGRESS NOTES
"Subjective    CPE  Hyperlipidemia  Depression      Brandy El is a 59 y.o. female who presents for a complete physical exam.  She is doing well today. She has a history of gi irritability. This has improved. She will occasionally take dicyclomine w/ benefit. She had egd and cscope in December. Labs w ifg decreased hdl. Fitness is limited to movement when caring for her grandchild. Patient has a history of depression. She takes zoloft daily w/ good benefit for mood.   Has osteopenia. Last DEXA 2017. She has a history of back surgery x 2 for \"ruptured disc\" 1997 and 2000. She reports that she is doing well from a pain perspective at this time.     Review of Systems   Constitutional: Negative.    HENT: Negative.    Eyes: Negative.    Respiratory: Negative.    Cardiovascular: Negative.    Gastrointestinal: Negative.    Endocrine: Negative.    Genitourinary: Negative.    Musculoskeletal: Negative.    Skin: Negative.    Allergic/Immunologic: Negative.    Neurological: Negative.    Hematological: Negative.    Psychiatric/Behavioral: Negative.        The following portions of the patient's history were reviewed and updated as appropriate: allergies, current medications, past family history, past medical history, past social history, past surgical history and problem list.     Patient Active Problem List   Diagnosis   • Health care maintenance   • Hyperlipidemia   • History of MRSA infection   • Dyspepsia   • Change in bowel habits   • FH: esophageal cancer       Past Medical History:   Diagnosis Date   • Anxiety    • ASCUS of cervix with negative high risk HPV 09/2001   • ASCUS of cervix with negative high risk HPV 09/2005   • Depression    • Essential hematuria    • Gait disturbance     R foot drop   • Hyperlipidemia    • Postmenopausal    • Vaginal delivery        Past Surgical History:   Procedure Laterality Date   • ANKLE SURGERY     • BACK SURGERY     • COLONOSCOPY  10/22/2013    tics, IH, HP   • COLONOSCOPY N/A " 12/3/2018    Procedure: COLONOSCOPY TO CECUM AND INTO TERMINAL ILEUM WITH COLD BIOPSIES AND COLD BIOPSY POLYPECTOMY;  Surgeon: Syd Emerson MD;  Location: [facility] ENDOSCOPY;  Service: Gastroenterology   • ENDOSCOPY N/A 12/3/2018    Procedure: ESOPHAGOGASTRODUODENOSCOPY WITH COLD BIOPSIES;  Surgeon: Syd Emerson MD;  Location: [facility] ENDOSCOPY;  Service: Gastroenterology   • LAPAROSCOPIC TUBAL LIGATION Bilateral    • TEAR DUCT SURGERY  06/2017       Family History   Problem Relation Age of Onset   • COPD Mother    • Heart disease Father    • Heart disease Brother    • Cancer Brother    • COPD Brother    • Breast cancer Sister    • No Known Problems Daughter    • No Known Problems Son    • No Known Problems Maternal Grandmother    • No Known Problems Paternal Grandmother    • No Known Problems Maternal Aunt    • No Known Problems Paternal Aunt    • BRCA 1/2 Neg Hx    • Colon cancer Neg Hx    • Endometrial cancer Neg Hx    • Ovarian cancer Neg Hx        Social History     Socioeconomic History   • Marital status:      Spouse name: Not on file   • Number of children: Not on file   • Years of education: Not on file   • Highest education level: Not on file   Tobacco Use   • Smoking status: Never Smoker   • Smokeless tobacco: Never Used   Substance and Sexual Activity   • Alcohol use: Yes     Comment: 2 x weekly   • Drug use: No   • Sexual activity: Yes     Birth control/protection: Post-menopausal       Current Outpatient Medications on File Prior to Visit   Medication Sig Dispense Refill   • CRESTOR 10 MG tablet Take 1 tablet by mouth Daily. 90 tablet 3   • diclofenac (VOLTAREN) 1 % gel gel Apply 4 g topically to the appropriate area as directed 4 (Four) Times a Day As Needed (foot pain). 300 g 2   • sertraline (ZOLOFT) 100 MG tablet Take  by mouth daily.       No current facility-administered medications on file prior to visit.        Allergies   Allergen Reactions   • Phenergan [Promethazine Hcl] Anxiety  "      Immunization History   Administered Date(s) Administered   • Hepatitis A 08/13/2018, 08/19/2019   • Tdap 06/25/2013       Objective     /80   Pulse 80   Ht 160 cm (63\")   Wt 76.7 kg (169 lb)   SpO2 97%   BMI 29.94 kg/m²     Physical Exam   Constitutional: She is oriented to person, place, and time. She appears well-developed and well-nourished.   HENT:   Head: Normocephalic and atraumatic.   Right Ear: External ear normal.   Left Ear: External ear normal.   Nose: Nose normal.   Mouth/Throat: Oropharynx is clear and moist.   Eyes: Conjunctivae and EOM are normal. Pupils are equal, round, and reactive to light.   Neck: Normal range of motion. Neck supple.   Cardiovascular: Normal rate, regular rhythm and normal heart sounds.   Pulmonary/Chest: Effort normal and breath sounds normal. No respiratory distress.   Abdominal: Soft. Bowel sounds are normal.   Genitourinary: Vagina normal and uterus normal. Rectal exam shows guaiac negative stool. No vaginal discharge found.   Musculoskeletal: Normal range of motion.   Neurological: She is alert and oriented to person, place, and time.   Skin: Skin is warm and dry.   Psychiatric: She has a normal mood and affect. Her behavior is normal. Judgment and thought content normal.   Nursing note and vitals reviewed.      Assessment/Plan   Brandy was seen today for annual exam.    Diagnoses and all orders for this visit:    Healthcare maintenance    Hyperlipidemia, unspecified hyperlipidemia type  -     ECG 12 Lead    Breast cancer screening by mammogram  -     Mammo screening digital tomosynthesis bilateral w CAD; Future    Pap smear for cervical cancer screening  -     Pap IG, HPV-hr    Other orders  -     neomycin-polymyxin-hydrocortisone (CORTISPORIN) 1 % solution otic solution; Administer 3 drops to the right ear 4 (Four) Times a Day.  -     Hepatitis A Vaccine Adult IM        Discussion    Patient presents today for a CPE.  Patient is encouraged a healthy low fat " low carb diet w/ routine physical activity. She really needs to increase this activity and this was discussed at length.   Patient will schedule a mammogram. Colon cancer screening is up to date.   Pap smear obtained today. Immunizations are up to date.   Patient will schedule a DEXA.    She will continue crestor 10 mg daily for lipid regulation. She will continue zoloft for mood. She will f/u in 1 year or prn.            Future Appointments   Date Time Provider Department Center   9/10/2019  8:45 AM [facility]   8/20/2020  8:10 AM [facility] None   8/24/2020  9:15 AM Tiffanie Woodard MD [facility] None

## 2019-08-21 LAB
CYTOLOGIST CVX/VAG CYTO: NORMAL
CYTOLOGY CVX/VAG DOC CYTO: NORMAL
CYTOLOGY CVX/VAG DOC THIN PREP: NORMAL
DX ICD CODE: NORMAL
HIV 1 & 2 AB SER-IMP: NORMAL
HPV I/H RISK 1 DNA CVX QL PROBE+SIG AMP: NEGATIVE
OTHER STN SPEC: NORMAL
STAT OF ADQ CVX/VAG CYTO-IMP: NORMAL

## 2019-09-10 ENCOUNTER — HOSPITAL ENCOUNTER (OUTPATIENT)
Dept: MAMMOGRAPHY | Facility: HOSPITAL | Age: 59
Discharge: HOME | End: 2019-09-10
Admitting: INTERNAL MEDICINE

## 2019-09-10 DIAGNOSIS — Z12.31 BREAST CANCER SCREENING BY MAMMOGRAM: ICD-10-CM

## 2019-09-10 PROCEDURE — 77063 BREAST TOMOSYNTHESIS BI: CPT

## 2019-09-10 PROCEDURE — 77067 SCR MAMMO BI INCL CAD: CPT

## 2019-09-23 ENCOUNTER — OFFICE VISIT (OUTPATIENT)
Dept: INTERNAL MEDICINE | Facility: CLINIC | Age: 59
End: 2019-09-23

## 2019-09-23 VITALS
DIASTOLIC BLOOD PRESSURE: 80 MMHG | HEART RATE: 73 BPM | WEIGHT: 172 LBS | OXYGEN SATURATION: 96 % | TEMPERATURE: 98.3 F | SYSTOLIC BLOOD PRESSURE: 120 MMHG | HEIGHT: 63 IN | BODY MASS INDEX: 30.48 KG/M2

## 2019-09-23 DIAGNOSIS — J06.9 UPPER RESPIRATORY TRACT INFECTION, UNSPECIFIED TYPE: Primary | ICD-10-CM

## 2019-09-23 DIAGNOSIS — J20.8 ACUTE BRONCHITIS DUE TO OTHER SPECIFIED ORGANISMS: ICD-10-CM

## 2019-09-23 PROCEDURE — 99213 OFFICE O/P EST LOW 20 MIN: CPT | Performed by: INTERNAL MEDICINE

## 2019-09-23 PROCEDURE — 87804 INFLUENZA ASSAY W/OPTIC: CPT | Performed by: INTERNAL MEDICINE

## 2019-09-23 RX ORDER — AZITHROMYCIN 250 MG/1
TABLET, FILM COATED ORAL
Qty: 6 TABLET | Refills: 0 | Status: SHIPPED | OUTPATIENT
Start: 2019-09-23 | End: 2020-07-22

## 2019-09-23 NOTE — PROGRESS NOTES
"Subjective     Brandy El is a 59 y.o. female who presents with   Chief Complaint   Patient presents with   • Cough   • Fever   • URI       History of Present Illness     Cough started three days ago.  Head congestion.  No sinus pain/ear pain/sore throat.  Cough is with production. She is SOA.  Fever up to 100.8.      Review of Systems   Constitutional: Positive for fever.   HENT: Positive for congestion. Negative for ear pain, sinus pain and sore throat.    Respiratory: Positive for cough, chest tightness and shortness of breath.    Cardiovascular: Negative.        The following portions of the patient's history were reviewed and updated as appropriate: allergies, current medications and problem list.    Patient Active Problem List    Diagnosis Date Noted   • Dyspepsia 10/17/2018     Note Last Updated: 10/17/2018     Added automatically from request for surgery [id number]     • Change in bowel habits 10/17/2018     Note Last Updated: 10/17/2018     Added automatically from request for surgery [id number]     • FH: esophageal cancer 10/17/2018     Note Last Updated: 10/17/2018     Added automatically from request for surgery [id number]     • History of MRSA infection 06/27/2017   • Health care maintenance 06/14/2016   • Hyperlipidemia 06/14/2016       Current Outpatient Medications on File Prior to Visit   Medication Sig Dispense Refill   • CRESTOR 10 MG tablet Take 1 tablet by mouth Daily. 90 tablet 3   • diclofenac (VOLTAREN) 1 % gel gel Apply 4 g topically to the appropriate area as directed 4 (Four) Times a Day As Needed (foot pain). 300 g 2   • neomycin-polymyxin-hydrocortisone (CORTISPORIN) 1 % solution otic solution Administer 3 drops to the right ear 4 (Four) Times a Day. 10 mL 1   • sertraline (ZOLOFT) 100 MG tablet Take  by mouth daily.       No current facility-administered medications on file prior to visit.        Objective     /80   Pulse 73   Temp 98.3 °F (36.8 °C)   Ht 160 cm (62.99\")   Wt 78 kg " (172 lb)   SpO2 96%   BMI 30.48 kg/m²     Physical Exam   Constitutional: She is oriented to person, place, and time. She appears well-developed and well-nourished.   HENT:   Head: Normocephalic and atraumatic.   Right Ear: Hearing and tympanic membrane normal.   Left Ear: Hearing and tympanic membrane normal.   Mouth/Throat: No oropharyngeal exudate or posterior oropharyngeal erythema.   Cardiovascular: Normal rate, regular rhythm and normal heart sounds.   Pulmonary/Chest: Effort normal and breath sounds normal.   Neurological: She is alert and oriented to person, place, and time.   Skin: Skin is warm and dry.   Psychiatric: She has a normal mood and affect. Her behavior is normal.       Assessment/Plan   Brandy was seen today for cough, fever and uri.    Diagnoses and all orders for this visit:    Upper respiratory tract infection, unspecified type  -     POC Influenza A / B  -     Cancel: POC CBC With / Auto Diff    Acute bronchitis due to other specified organisms    Other orders  -     azithromycin (ZITHROMAX Z-CHAY) 250 MG tablet; Take 2 tablets the first day, then 1 tablet daily for 4 days.        Discussion    Patient presents with episode of acute bronchitis.  Flu is negative. A prescription for antibiotics is provided today.  The patient is instructed to take along with Mucinex DM.  Let me know they are not feeling better over the next 3 days or if there is any change in symptoms.             Future Appointments   Date Time Provider Department Center   8/20/2020  8:10 AM [facility] None   8/24/2020  9:15 AM Tiffanie Woodard MD [facility] None

## 2020-01-24 RX ORDER — ROSUVASTATIN CALCIUM 10 MG/1
10 TABLET, FILM COATED ORAL DAILY
Qty: 90 TABLET | Refills: 3 | Status: SHIPPED | OUTPATIENT
Start: 2020-01-24 | End: 2020-11-06 | Stop reason: SDUPTHER

## 2020-08-13 DIAGNOSIS — E78.5 HYPERLIPIDEMIA, UNSPECIFIED HYPERLIPIDEMIA TYPE: Primary | ICD-10-CM

## 2020-08-13 DIAGNOSIS — Z00.00 HEALTH CARE MAINTENANCE: ICD-10-CM

## 2020-08-20 LAB
ALBUMIN SERPL-MCNC: 4.6 G/DL (ref 3.5–5.2)
ALBUMIN/GLOB SERPL: 2.7 G/DL
ALP SERPL-CCNC: 56 U/L (ref 39–117)
ALT SERPL-CCNC: 15 U/L (ref 1–33)
APPEARANCE UR: CLEAR
AST SERPL-CCNC: 14 U/L (ref 1–32)
BACTERIA #/AREA URNS HPF: ABNORMAL /HPF
BASOPHILS # BLD AUTO: 0.03 10*3/MM3 (ref 0–0.2)
BASOPHILS NFR BLD AUTO: 0.7 % (ref 0–1.5)
BILIRUB SERPL-MCNC: 0.4 MG/DL (ref 0–1.2)
BILIRUB UR QL STRIP: NEGATIVE
BUN SERPL-MCNC: 18 MG/DL (ref 6–20)
BUN/CREAT SERPL: 25.7 (ref 7–25)
CALCIUM SERPL-MCNC: 8.8 MG/DL (ref 8.6–10.5)
CASTS URNS MICRO: ABNORMAL
CHLORIDE SERPL-SCNC: 106 MMOL/L (ref 98–107)
CHOLEST SERPL-MCNC: 171 MG/DL (ref 0–200)
CO2 SERPL-SCNC: 27.2 MMOL/L (ref 22–29)
COLOR UR: YELLOW
CREAT SERPL-MCNC: 0.7 MG/DL (ref 0.57–1)
EOSINOPHIL # BLD AUTO: 0.08 10*3/MM3 (ref 0–0.4)
EOSINOPHIL NFR BLD AUTO: 1.9 % (ref 0.3–6.2)
EPI CELLS #/AREA URNS HPF: ABNORMAL /HPF
ERYTHROCYTE [DISTWIDTH] IN BLOOD BY AUTOMATED COUNT: 13 % (ref 12.3–15.4)
GLOBULIN SER CALC-MCNC: 1.7 GM/DL
GLUCOSE SERPL-MCNC: 98 MG/DL (ref 65–99)
GLUCOSE UR QL: NEGATIVE
HCT VFR BLD AUTO: 38.5 % (ref 34–46.6)
HDLC SERPL-MCNC: 45 MG/DL (ref 40–60)
HGB BLD-MCNC: 13.1 G/DL (ref 12–15.9)
HGB UR QL STRIP: ABNORMAL
IMM GRANULOCYTES # BLD AUTO: 0.01 10*3/MM3 (ref 0–0.05)
IMM GRANULOCYTES NFR BLD AUTO: 0.2 % (ref 0–0.5)
KETONES UR QL STRIP: NEGATIVE
LDLC SERPL CALC-MCNC: 82 MG/DL (ref 0–100)
LEUKOCYTE ESTERASE UR QL STRIP: ABNORMAL
LYMPHOCYTES # BLD AUTO: 1.29 10*3/MM3 (ref 0.7–3.1)
LYMPHOCYTES NFR BLD AUTO: 31.3 % (ref 19.6–45.3)
MCH RBC QN AUTO: 30.2 PG (ref 26.6–33)
MCHC RBC AUTO-ENTMCNC: 34 G/DL (ref 31.5–35.7)
MCV RBC AUTO: 88.7 FL (ref 79–97)
MONOCYTES # BLD AUTO: 0.34 10*3/MM3 (ref 0.1–0.9)
MONOCYTES NFR BLD AUTO: 8.3 % (ref 5–12)
NEUTROPHILS # BLD AUTO: 2.37 10*3/MM3 (ref 1.7–7)
NEUTROPHILS NFR BLD AUTO: 57.6 % (ref 42.7–76)
NITRITE UR QL STRIP: NEGATIVE
NRBC BLD AUTO-RTO: 0 /100 WBC (ref 0–0.2)
PH UR STRIP: 5.5 [PH] (ref 5–8)
PLATELET # BLD AUTO: 222 10*3/MM3 (ref 140–450)
POTASSIUM SERPL-SCNC: 4.2 MMOL/L (ref 3.5–5.2)
PROT SERPL-MCNC: 6.3 G/DL (ref 6–8.5)
PROT UR QL STRIP: NEGATIVE
RBC # BLD AUTO: 4.34 10*6/MM3 (ref 3.77–5.28)
RBC #/AREA URNS HPF: ABNORMAL /HPF
SODIUM SERPL-SCNC: 143 MMOL/L (ref 136–145)
SP GR UR: 1.02 (ref 1–1.03)
TRIGL SERPL-MCNC: 218 MG/DL (ref 0–150)
TSH SERPL DL<=0.005 MIU/L-ACNC: 2.87 UIU/ML (ref 0.27–4.2)
UROBILINOGEN UR STRIP-MCNC: ABNORMAL MG/DL
VLDLC SERPL CALC-MCNC: 43.6 MG/DL
WBC # BLD AUTO: 4.12 10*3/MM3 (ref 3.4–10.8)
WBC #/AREA URNS HPF: ABNORMAL /HPF

## 2020-08-24 ENCOUNTER — OFFICE VISIT (OUTPATIENT)
Dept: INTERNAL MEDICINE | Facility: CLINIC | Age: 60
End: 2020-08-24

## 2020-08-24 VITALS
BODY MASS INDEX: 30.83 KG/M2 | DIASTOLIC BLOOD PRESSURE: 80 MMHG | SYSTOLIC BLOOD PRESSURE: 140 MMHG | HEART RATE: 80 BPM | TEMPERATURE: 97.3 F | HEIGHT: 63 IN | RESPIRATION RATE: 16 BRPM | WEIGHT: 174 LBS | OXYGEN SATURATION: 98 %

## 2020-08-24 DIAGNOSIS — F32.A ANXIETY AND DEPRESSION: ICD-10-CM

## 2020-08-24 DIAGNOSIS — Z00.00 HEALTH CARE MAINTENANCE: Primary | ICD-10-CM

## 2020-08-24 DIAGNOSIS — F41.9 ANXIETY AND DEPRESSION: ICD-10-CM

## 2020-08-24 DIAGNOSIS — E78.5 HYPERLIPIDEMIA, UNSPECIFIED HYPERLIPIDEMIA TYPE: ICD-10-CM

## 2020-08-24 PROCEDURE — 99396 PREV VISIT EST AGE 40-64: CPT | Performed by: INTERNAL MEDICINE

## 2020-08-24 RX ORDER — DESVENLAFAXINE SUCCINATE 50 MG/1
50 TABLET, EXTENDED RELEASE ORAL DAILY
Qty: 30 TABLET | Refills: 5 | Status: SHIPPED | OUTPATIENT
Start: 2020-08-24 | End: 2021-03-08 | Stop reason: SDUPTHER

## 2020-08-24 NOTE — PROGRESS NOTES
Subjective   CPE  Hyperlipidemia  Anxious/ depressed symptoms.     Brandy El is a 60 y.o. female who presents for a complete physical exam. She is doing fairly well today. Has struggled with the limitations involved in the pandemic.  bp elevated on arrival. On average 125/70 at home. Patient is taking zoloft for mood. Her mood has been down. She does not work well w/ her psychiatrist at this time.           Review of Systems   Constitutional: Negative.    HENT: Negative.    Eyes: Negative.    Respiratory: Negative.    Cardiovascular: Negative.    Gastrointestinal: Negative.    Endocrine: Negative.    Genitourinary: Negative.    Musculoskeletal: Negative.    Skin: Negative.    Allergic/Immunologic: Negative.    Neurological: Negative.    Hematological: Negative.    Psychiatric/Behavioral: Negative.        The following portions of the patient's history were reviewed and updated as appropriate: allergies, current medications, past family history, past medical history, past social history, past surgical history and problem list.     Patient Active Problem List   Diagnosis   • Health care maintenance   • Hyperlipidemia   • History of MRSA infection   • Dyspepsia   • Change in bowel habits   • FH: esophageal cancer       Past Medical History:   Diagnosis Date   • Anxiety    • ASCUS of cervix with negative high risk HPV 09/2001   • ASCUS of cervix with negative high risk HPV 09/2005   • Depression    • Essential hematuria    • Gait disturbance     R foot drop   • Hyperlipidemia    • Postmenopausal    • Vaginal delivery        Past Surgical History:   Procedure Laterality Date   • ANKLE SURGERY     • BACK SURGERY     • COLONOSCOPY  10/22/2013    tics, IH, HP   • COLONOSCOPY N/A 12/3/2018    Procedure: COLONOSCOPY TO CECUM AND INTO TERMINAL ILEUM WITH COLD BIOPSIES AND COLD BIOPSY POLYPECTOMY;  Surgeon: Syd Emerson MD;  Location: [facility] ENDOSCOPY;  Service: Gastroenterology   • ENDOSCOPY N/A 12/3/2018    Procedure:  ESOPHAGOGASTRODUODENOSCOPY WITH COLD BIOPSIES;  Surgeon: Syd Emerson MD;  Location: [facility] ENDOSCOPY;  Service: Gastroenterology   • LAPAROSCOPIC TUBAL LIGATION Bilateral    • TEAR DUCT SURGERY  06/2017       Family History   Problem Relation Age of Onset   • COPD Mother    • Heart disease Father    • Heart disease Brother    • Cancer Brother    • COPD Brother    • Breast cancer Sister    • No Known Problems Daughter    • No Known Problems Son    • No Known Problems Maternal Grandmother    • No Known Problems Paternal Grandmother    • No Known Problems Maternal Aunt    • No Known Problems Paternal Aunt    • BRCA 1/2 Neg Hx    • Colon cancer Neg Hx    • Endometrial cancer Neg Hx    • Ovarian cancer Neg Hx        Social History     Socioeconomic History   • Marital status:      Spouse name: Not on file   • Number of children: Not on file   • Years of education: Not on file   • Highest education level: Not on file   Tobacco Use   • Smoking status: Never Smoker   • Smokeless tobacco: Never Used   Substance and Sexual Activity   • Alcohol use: Yes     Comment: 2 x weekly   • Drug use: No   • Sexual activity: Yes     Birth control/protection: Post-menopausal       Current Outpatient Medications on File Prior to Visit   Medication Sig Dispense Refill   • CRESTOR 10 MG tablet Take 1 tablet by mouth Daily. 90 tablet 3   • diclofenac (VOLTAREN) 1 % gel gel Apply 4 g topically to the appropriate area as directed 4 (Four) Times a Day As Needed (foot pain). 300 g 2   • fluticasone (FLONASE) 50 MCG/ACT nasal spray 2 sprays into the nostril(s) as directed by provider Daily. 1 bottle 0   • hyoscyamine (LEVSIN) 0.125 MG SL tablet Take 1 tablet by mouth Every 4 (Four) Hours As Needed for Cramping. 30 tablet 0   • neomycin-polymyxin-hydrocortisone (CORTISPORIN) 1 % solution otic solution Administer 3 drops to the right ear 4 (Four) Times a Day. 10 mL 1   • [DISCONTINUED] sertraline (ZOLOFT) 100 MG tablet Take  by mouth daily.  "      No current facility-administered medications on file prior to visit.        Allergies   Allergen Reactions   • Phenergan [Promethazine Hcl] Anxiety       Immunization History   Administered Date(s) Administered   • Hepatitis A 08/13/2018, 08/19/2019   • Tdap 06/25/2013       Objective     /80   Pulse 80   Temp 97.3 °F (36.3 °C) (Temporal)   Resp 16   Ht 160 cm (63\")   Wt 78.9 kg (174 lb)   SpO2 98%   BMI 30.82 kg/m²     Physical Exam   Constitutional: She is oriented to person, place, and time. She appears well-developed and well-nourished.   HENT:   Head: Normocephalic and atraumatic.   Right Ear: External ear normal.   Left Ear: External ear normal.   Nose: Nose normal.   Mouth/Throat: Oropharynx is clear and moist.   Eyes: Pupils are equal, round, and reactive to light. Conjunctivae and EOM are normal.   Neck: Normal range of motion. Neck supple.   Cardiovascular: Normal rate, regular rhythm, normal heart sounds and intact distal pulses.   Pulmonary/Chest: Effort normal and breath sounds normal. No respiratory distress.   Abdominal: Soft. Bowel sounds are normal.   Musculoskeletal: Normal range of motion.   Neurological: She is alert and oriented to person, place, and time.   Skin: Skin is warm and dry.   Psychiatric: She has a normal mood and affect. Her behavior is normal. Judgment and thought content normal.   Nursing note and vitals reviewed.      Assessment/Plan   Brandy was seen today for annual exam.    Diagnoses and all orders for this visit:    Health care maintenance    Hyperlipidemia, unspecified hyperlipidemia type    Anxiety and depression    Other orders  -     desvenlafaxine (Pristiq) 50 MG 24 hr tablet; Take 1 tablet by mouth Daily.        Discussion    Patient presents today for a CPE.  Patient follows a healthy diet.   Patient follows an adequate exercise regimen. Mammogram is up to date.   Colon cancer screening is up to date.   Pap smear performed every 3 years.   " Immunizations are up to date.  Will switch from zoloft to pristiq. Meditation hs. Gratitude journal. Increased fitness. Improved nutrition. F/u 3 mo or prn.            No future appointments.

## 2020-11-06 RX ORDER — ROSUVASTATIN CALCIUM 10 MG/1
10 TABLET, FILM COATED ORAL DAILY
Qty: 90 TABLET | Refills: 3 | Status: SHIPPED | OUTPATIENT
Start: 2020-11-06 | End: 2020-11-09 | Stop reason: SDUPTHER

## 2020-11-06 NOTE — TELEPHONE ENCOUNTER
Caller: Brandy El    Relationship: Self    Best call back number: [phone]    Medication needed:   Requested Prescriptions     Pending Prescriptions Disp Refills   • Crestor 10 MG tablet 90 tablet 3     Sig: Take 1 tablet by mouth Daily.       When do you need the refill by:ASAP    What details did the patient provide when requesting the medication: She has been out for two weeks    Does the patient have less than a 3 day supply:  [x] Yes  [] No    What is the patient's preferred pharmacy: [facility] [address] AT [facility] [phone] [facility] [phone] FX

## 2020-11-09 ENCOUNTER — TELEPHONE (OUTPATIENT)
Dept: INTERNAL MEDICINE | Facility: CLINIC | Age: 60
End: 2020-11-09

## 2020-11-09 RX ORDER — ROSUVASTATIN CALCIUM 10 MG/1
10 TABLET, FILM COATED ORAL DAILY
Qty: 90 TABLET | Refills: 3 | Status: SHIPPED | OUTPATIENT
Start: 2020-11-09 | End: 2021-12-08 | Stop reason: SDUPTHER

## 2020-11-09 NOTE — TELEPHONE ENCOUNTER
You sent in a refill of Crestor medication to [facility] on Friday but the patient called and stated that the pharmacy told her that they still have not received the rx    Can you please re-send to [facility] on [address]

## 2020-11-10 ENCOUNTER — TELEPHONE (OUTPATIENT)
Dept: INTERNAL MEDICINE | Facility: CLINIC | Age: 60
End: 2020-11-10

## 2020-11-10 NOTE — TELEPHONE ENCOUNTER
Patient called again about wanting to refill her crestor 10 mg medication. She said that she has called about the refill three times. Patient states that she got off the phone again with [facility] pharmacy and they have not received it even though the receipt is confirmed.  Verified pharmacy with patient and we have the correct one.  Nadia can you please call the pharmacy and refill medication since they are not receiving the med refill electronically.     Augustina King   Phone # [phone]

## 2021-03-08 RX ORDER — DESVENLAFAXINE SUCCINATE 50 MG/1
50 TABLET, EXTENDED RELEASE ORAL DAILY
Qty: 30 TABLET | Refills: 5 | OUTPATIENT
Start: 2021-03-08 | End: 2021-03-16

## 2021-03-08 NOTE — TELEPHONE ENCOUNTER
Caller: Brandy El    Relationship: Self    Best call back number: [phone] (H)    Medication needed:   Requested Prescriptions     Pending Prescriptions Disp Refills   • desvenlafaxine (Pristiq) 50 MG 24 hr tablet 30 tablet 5     Sig: Take 1 tablet by mouth Daily.       When do you need the refill by: 03.08.21    What details did the patient provide when requesting the medication: PATIENT STATES HAS 6 LEFT    Does the patient have less than a 3 day supply:  [] Yes  [x] No    What is the patient's preferred pharmacy: [facility] AT [facility] - [phone] [facility] [phone] FX           D

## 2021-03-26 ENCOUNTER — BULK ORDERING (OUTPATIENT)
Dept: CASE MANAGEMENT | Facility: OTHER | Age: 61
End: 2021-03-26

## 2021-03-26 DIAGNOSIS — Z23 IMMUNIZATION DUE: ICD-10-CM

## 2021-07-19 ENCOUNTER — TELEPHONE (OUTPATIENT)
Dept: INTERNAL MEDICINE | Facility: CLINIC | Age: 61
End: 2021-07-19

## 2021-07-19 ENCOUNTER — OFFICE VISIT (OUTPATIENT)
Dept: INTERNAL MEDICINE | Facility: CLINIC | Age: 61
End: 2021-07-19

## 2021-07-19 VITALS — HEIGHT: 63 IN | WEIGHT: 165 LBS | BODY MASS INDEX: 29.23 KG/M2

## 2021-07-19 DIAGNOSIS — F41.9 ANXIETY: Primary | ICD-10-CM

## 2021-07-19 PROCEDURE — 99214 OFFICE O/P EST MOD 30 MIN: CPT | Performed by: INTERNAL MEDICINE

## 2021-07-19 RX ORDER — CLONAZEPAM 0.5 MG/1
0.5 TABLET ORAL 2 TIMES DAILY PRN
Qty: 60 TABLET | Refills: 1 | Status: SHIPPED | OUTPATIENT
Start: 2021-07-19 | End: 2022-03-09 | Stop reason: SDUPTHER

## 2021-07-19 RX ORDER — DESVENLAFAXINE 100 MG/1
100 TABLET, EXTENDED RELEASE ORAL DAILY
Qty: 30 TABLET | Refills: 5 | Status: SHIPPED | OUTPATIENT
Start: 2021-07-19 | End: 2022-01-25

## 2021-07-19 NOTE — TELEPHONE ENCOUNTER
Caller: Brandy El    Relationship: Self    Best call back number: [phone]     What is the best time to reach you: ANYTIME    Who are you requesting to speak with CELESTE        What was the call regarding: PATIENT IS ASKING TO HAVE CELESTE CALLBACK ASAP DID NOT WANT TO SAY WHAT IT IS FOR JUST THAT IT IS URGENT.    Do you require a callback: YES

## 2021-07-19 NOTE — PROGRESS NOTES
Chief Complaint   Patient presents with   • Anxiety      ill       History of Present Illness   Brandy El is a 61 y.o. female presents for acute care. Patient is seen by video in place of in office related to COVID-19 pandemic as well as emotional distress limiting safe driving. She does consent to a visit by video. She was just informed that her spouse has esophageal cancer.  She has tried patient's xanax and is taking .25-.5mg for last several day. Decreased appetite. She is having difficulty w/ sleep. Was previously on zoloft and switched to pristiq related to decreased energy and mood. She can not tell if it is working better or not. She just wakes feeling anxious especially since his diagnosis. Previously on clonazepam from prior md.       The following portions of the patient's history were reviewed and updated as appropriate: allergies, current medications, past family history, past medical history, past social history, past surgical history and problem list.  Current Outpatient Medications on File Prior to Visit   Medication Sig Dispense Refill   • Crestor 10 MG tablet Take 1 tablet by mouth Daily. 90 tablet 3     No current facility-administered medications on file prior to visit.     Review of Systems   Constitutional: Positive for fatigue.   HENT: Negative.    Eyes: Negative.    Respiratory: Negative.    Cardiovascular: Negative.    Gastrointestinal: Negative.    Endocrine: Negative.    Genitourinary: Negative.    Musculoskeletal: Negative.    Skin: Negative.    Allergic/Immunologic: Negative.    Neurological: Negative.    Hematological: Negative.    Psychiatric/Behavioral: The patient is nervous/anxious.        Objective   Physical Exam  Vitals and nursing note reviewed.   Constitutional:       Appearance: Normal appearance.   HENT:      Head: Normocephalic and atraumatic.      Nose: Nose normal.   Eyes:      Extraocular Movements: Extraocular movements intact.   Pulmonary:      Effort:  "Pulmonary effort is normal.   Musculoskeletal:         General: Normal range of motion.      Cervical back: Normal range of motion.   Skin:     General: Skin is warm.   Neurological:      General: No focal deficit present.      Mental Status: She is alert and oriented to person, place, and time.   Psychiatric:         Thought Content: Thought content normal.         Judgment: Judgment normal.      Comments: Anxious/ tearful          Ht 160 cm (63\")   Wt 74.8 kg (165 lb)   BMI 29.23 kg/m²     Assessment/Plan   Diagnoses and all orders for this visit:    Anxiety  -     clonazePAM (KlonoPIN) 0.5 MG tablet; Take 1 tablet by mouth 2 (Two) Times a Day As Needed for Anxiety.    Other orders  -     desvenlafaxine (Pristiq) 100 MG 24 hr tablet; Take 1 tablet by mouth Daily.        Patient w/ acute on chronic anxiety. Will try increasing pristiq to 100 mg. Could consider buspar or returning to zoloft v switch to lexapro. She will be given prn clonazepam but is cautioned it has side effects and can be habituating. To avoid caffeine/ other things that increase anxiety. Is to start walking, meet w/ a therapist, and engage in daily meditation. Total visit 26 min w >50% spent counseling the patient.  F/u one month as scheduled.        "

## 2021-08-18 DIAGNOSIS — Z00.00 HEALTH CARE MAINTENANCE: ICD-10-CM

## 2021-08-18 DIAGNOSIS — E78.5 HYPERLIPIDEMIA, UNSPECIFIED HYPERLIPIDEMIA TYPE: Primary | ICD-10-CM

## 2021-08-26 LAB
ALBUMIN SERPL-MCNC: 4.6 G/DL (ref 3.5–5.2)
ALBUMIN/GLOB SERPL: 1.8 G/DL
ALP SERPL-CCNC: 62 U/L (ref 39–117)
ALT SERPL-CCNC: 21 U/L (ref 1–33)
APPEARANCE UR: CLEAR
AST SERPL-CCNC: 21 U/L (ref 1–32)
BACTERIA #/AREA URNS HPF: ABNORMAL /HPF
BASOPHILS # BLD AUTO: 0.04 10*3/MM3 (ref 0–0.2)
BASOPHILS NFR BLD AUTO: 1.1 % (ref 0–1.5)
BILIRUB SERPL-MCNC: 0.4 MG/DL (ref 0–1.2)
BILIRUB UR QL STRIP: NEGATIVE
BUN SERPL-MCNC: 14 MG/DL (ref 6–20)
BUN/CREAT SERPL: 18.2 (ref 7–25)
CALCIUM SERPL-MCNC: 9.4 MG/DL (ref 8.6–10.5)
CASTS URNS MICRO: ABNORMAL
CHLORIDE SERPL-SCNC: 106 MMOL/L (ref 98–107)
CHOLEST SERPL-MCNC: 200 MG/DL (ref 0–200)
CO2 SERPL-SCNC: 25.9 MMOL/L (ref 22–29)
COLOR UR: YELLOW
CREAT SERPL-MCNC: 0.77 MG/DL (ref 0.57–1)
EOSINOPHIL # BLD AUTO: 0.1 10*3/MM3 (ref 0–0.4)
EOSINOPHIL NFR BLD AUTO: 2.8 % (ref 0.3–6.2)
EPI CELLS #/AREA URNS HPF: ABNORMAL /HPF
ERYTHROCYTE [DISTWIDTH] IN BLOOD BY AUTOMATED COUNT: 12.8 % (ref 12.3–15.4)
GLOBULIN SER CALC-MCNC: 2.5 GM/DL
GLUCOSE SERPL-MCNC: 112 MG/DL (ref 65–99)
GLUCOSE UR QL: NEGATIVE
HCT VFR BLD AUTO: 40.8 % (ref 34–46.6)
HDLC SERPL-MCNC: 48 MG/DL (ref 40–60)
HGB BLD-MCNC: 13.8 G/DL (ref 12–15.9)
HGB UR QL STRIP: ABNORMAL
IMM GRANULOCYTES # BLD AUTO: 0.02 10*3/MM3 (ref 0–0.05)
IMM GRANULOCYTES NFR BLD AUTO: 0.6 % (ref 0–0.5)
KETONES UR QL STRIP: NEGATIVE
LDLC SERPL CALC-MCNC: 118 MG/DL (ref 0–100)
LEUKOCYTE ESTERASE UR QL STRIP: ABNORMAL
LYMPHOCYTES # BLD AUTO: 0.97 10*3/MM3 (ref 0.7–3.1)
LYMPHOCYTES NFR BLD AUTO: 26.9 % (ref 19.6–45.3)
MCH RBC QN AUTO: 30.2 PG (ref 26.6–33)
MCHC RBC AUTO-ENTMCNC: 33.8 G/DL (ref 31.5–35.7)
MCV RBC AUTO: 89.3 FL (ref 79–97)
MONOCYTES # BLD AUTO: 0.3 10*3/MM3 (ref 0.1–0.9)
MONOCYTES NFR BLD AUTO: 8.3 % (ref 5–12)
NEUTROPHILS # BLD AUTO: 2.18 10*3/MM3 (ref 1.7–7)
NEUTROPHILS NFR BLD AUTO: 60.3 % (ref 42.7–76)
NITRITE UR QL STRIP: NEGATIVE
NRBC BLD AUTO-RTO: 0 /100 WBC (ref 0–0.2)
PH UR STRIP: 5.5 [PH] (ref 5–8)
PLATELET # BLD AUTO: 198 10*3/MM3 (ref 140–450)
POTASSIUM SERPL-SCNC: 4.4 MMOL/L (ref 3.5–5.2)
PROT SERPL-MCNC: 7.1 G/DL (ref 6–8.5)
PROT UR QL STRIP: NEGATIVE
RBC # BLD AUTO: 4.57 10*6/MM3 (ref 3.77–5.28)
RBC #/AREA URNS HPF: ABNORMAL /HPF
SODIUM SERPL-SCNC: 144 MMOL/L (ref 136–145)
SP GR UR: 1.03 (ref 1–1.03)
TRIGL SERPL-MCNC: 192 MG/DL (ref 0–150)
TSH SERPL DL<=0.005 MIU/L-ACNC: 1.21 UIU/ML (ref 0.27–4.2)
UROBILINOGEN UR STRIP-MCNC: ABNORMAL MG/DL
VLDLC SERPL CALC-MCNC: 34 MG/DL (ref 5–40)
WBC # BLD AUTO: 3.61 10*3/MM3 (ref 3.4–10.8)
WBC #/AREA URNS HPF: ABNORMAL /HPF

## 2021-08-30 ENCOUNTER — OFFICE VISIT (OUTPATIENT)
Dept: INTERNAL MEDICINE | Facility: CLINIC | Age: 61
End: 2021-08-30

## 2021-08-30 VITALS
HEIGHT: 63 IN | BODY MASS INDEX: 29.95 KG/M2 | WEIGHT: 169 LBS | SYSTOLIC BLOOD PRESSURE: 150 MMHG | HEART RATE: 89 BPM | DIASTOLIC BLOOD PRESSURE: 76 MMHG

## 2021-08-30 DIAGNOSIS — Z00.00 HEALTH CARE MAINTENANCE: Primary | ICD-10-CM

## 2021-08-30 DIAGNOSIS — E78.5 HYPERLIPIDEMIA, UNSPECIFIED HYPERLIPIDEMIA TYPE: ICD-10-CM

## 2021-08-30 DIAGNOSIS — F41.9 ANXIOUS MOOD: ICD-10-CM

## 2021-08-30 DIAGNOSIS — N89.8 VAGINAL ITCHING: ICD-10-CM

## 2021-08-30 DIAGNOSIS — N89.8 VAGINAL DISCHARGE: ICD-10-CM

## 2021-08-30 DIAGNOSIS — I10 HYPERTENSION, UNSPECIFIED TYPE: ICD-10-CM

## 2021-08-30 DIAGNOSIS — Z12.31 BREAST CANCER SCREENING BY MAMMOGRAM: ICD-10-CM

## 2021-08-30 PROCEDURE — 99396 PREV VISIT EST AGE 40-64: CPT | Performed by: INTERNAL MEDICINE

## 2021-08-30 RX ORDER — CLOCORTOLONE PIVALATE 0 G/G
1 CREAM TOPICAL DAILY
Qty: 45 G | Refills: 1 | Status: SHIPPED | OUTPATIENT
Start: 2021-08-30 | End: 2022-09-16

## 2021-08-30 RX ORDER — VALSARTAN 160 MG/1
160 TABLET ORAL DAILY
Qty: 90 TABLET | Refills: 3 | Status: SHIPPED | OUTPATIENT
Start: 2021-08-30 | End: 2022-09-16 | Stop reason: DRUGHIGH

## 2021-08-30 NOTE — PROGRESS NOTES
Subjective   CPE  Hyperlipidemia  Anxious/ depressed symptoms.     Brandy El is a 61 y.o. female who presents for a complete physical exam.  Patient's spouse recently diagnosed with gastric cancer. She felt acutely anxious upon diagnosis. She increased pristiq to 100 mg daily. She also has prn clonazepam. Initially required 1 mg daily now only 1/2 tab a couple of times a week.   She is walking 45 min daily. She prays twice daily. She is spending time w/ family and her daughter has visited from [place] a couple of times.   BP is elevated today and on previous testing. Father and brother w/ cad. Patient had a negative stress echo 2015. She is taking crestor daily w/ failry good lipid regulation. Slightly increased from last testing.       Review of Systems   Constitutional: Negative.    HENT: Negative.    Eyes: Negative.    Respiratory: Negative.    Cardiovascular: Negative.    Gastrointestinal: Negative.    Endocrine: Negative.    Genitourinary: Negative.    Musculoskeletal: Negative.    Skin: Negative.    Allergic/Immunologic: Negative.    Neurological: Negative.    Hematological: Negative.    Psychiatric/Behavioral: The patient is nervous/anxious.        The following portions of the patient's history were reviewed and updated as appropriate: allergies, current medications, past family history, past medical history, past social history, past surgical history and problem list.     Patient Active Problem List   Diagnosis   • Health care maintenance   • Hyperlipidemia   • History of MRSA infection   • Dyspepsia   • Change in bowel habits   • FH: esophageal cancer       Past Medical History:   Diagnosis Date   • Anxiety    • ASCUS of cervix with negative high risk HPV 09/2001   • ASCUS of cervix with negative high risk HPV 09/2005   • Depression    • Essential hematuria    • Gait disturbance     R foot drop   • Hyperlipidemia    • Postmenopausal    • Vaginal delivery        Past Surgical History:   Procedure  Laterality Date   • ANKLE SURGERY     • BACK SURGERY     • COLONOSCOPY  10/22/2013    tics, IH, HP   • COLONOSCOPY N/A 12/3/2018    Procedure: COLONOSCOPY TO CECUM AND INTO TERMINAL ILEUM WITH COLD BIOPSIES AND COLD BIOPSY POLYPECTOMY;  Surgeon: Syd Emerson MD;  Location: [facility] ENDOSCOPY;  Service: Gastroenterology   • ENDOSCOPY N/A 12/3/2018    Procedure: ESOPHAGOGASTRODUODENOSCOPY WITH COLD BIOPSIES;  Surgeon: Syd Emerosn MD;  Location: [facility] ENDOSCOPY;  Service: Gastroenterology   • LAPAROSCOPIC TUBAL LIGATION Bilateral    • TEAR DUCT SURGERY  06/2017       Family History   Problem Relation Age of Onset   • COPD Mother    • Heart disease Father    • Heart disease Brother    • Cancer Brother    • COPD Brother    • Breast cancer Sister    • No Known Problems Daughter    • No Known Problems Son    • No Known Problems Maternal Grandmother    • No Known Problems Paternal Grandmother    • No Known Problems Maternal Aunt    • No Known Problems Paternal Aunt    • BRCA 1/2 Neg Hx    • Colon cancer Neg Hx    • Endometrial cancer Neg Hx    • Ovarian cancer Neg Hx        Social History     Socioeconomic History   • Marital status:      Spouse name: Not on file   • Number of children: Not on file   • Years of education: Not on file   • Highest education level: Not on file   Tobacco Use   • Smoking status: Never Smoker   • Smokeless tobacco: Never Used   Substance and Sexual Activity   • Alcohol use: Yes     Comment: 2 x weekly   • Drug use: No   • Sexual activity: Yes     Birth control/protection: Post-menopausal       Current Outpatient Medications on File Prior to Visit   Medication Sig Dispense Refill   • clonazePAM (KlonoPIN) 0.5 MG tablet Take 1 tablet by mouth 2 (Two) Times a Day As Needed for Anxiety. 60 tablet 1   • Crestor 10 MG tablet Take 1 tablet by mouth Daily. 90 tablet 3   • desvenlafaxine (Pristiq) 100 MG 24 hr tablet Take 1 tablet by mouth Daily. 30 tablet 5     No current facility-administered  "medications on file prior to visit.       Allergies   Allergen Reactions   • Phenergan [Promethazine Hcl] Anxiety       Immunization History   Administered Date(s) Administered   • COVID-19 (PFIZER) 03/01/2021, 04/01/2021   • Hepatitis A 08/13/2018, 08/19/2019   • Tdap 06/25/2013       Objective     /76   Pulse 89   Ht 160 cm (63\")   Wt 76.7 kg (169 lb)   BMI 29.94 kg/m²     Physical Exam  Vitals and nursing note reviewed.   Constitutional:       Appearance: Normal appearance. She is well-developed.   HENT:      Head: Normocephalic and atraumatic.      Right Ear: Tympanic membrane and external ear normal.      Left Ear: Tympanic membrane and external ear normal.      Nose: Nose normal.      Mouth/Throat:      Mouth: Mucous membranes are moist.   Eyes:      Extraocular Movements: Extraocular movements intact.      Pupils: Pupils are equal, round, and reactive to light.   Cardiovascular:      Rate and Rhythm: Normal rate and regular rhythm.      Pulses: Normal pulses.      Heart sounds: Normal heart sounds.   Pulmonary:      Effort: Pulmonary effort is normal. No respiratory distress.      Breath sounds: Normal breath sounds.   Abdominal:      General: Abdomen is flat. Bowel sounds are normal.      Palpations: Abdomen is soft.   Genitourinary:     Rectum: Normal.      Comments: Vulvar atrophy w/ erythema  Musculoskeletal:         General: Normal range of motion.      Cervical back: Normal range of motion and neck supple.   Skin:     General: Skin is warm and dry.   Neurological:      General: No focal deficit present.      Mental Status: She is alert and oriented to person, place, and time.   Psychiatric:         Mood and Affect: Mood normal.         Behavior: Behavior normal.         Thought Content: Thought content normal.         Judgment: Judgment normal.         Assessment/Plan   Diagnoses and all orders for this visit:    1. Health care maintenance (Primary)    2. Hyperlipidemia, unspecified " hyperlipidemia type    3. Hypertension, unspecified type    4. Anxious mood    5. Breast cancer screening by mammogram  -     Mammo screening digital tomosynthesis bilateral w CAD; Future    Other orders  -     valsartan (Diovan) 160 MG tablet; Take 1 tablet by mouth Daily.  Dispense: 90 tablet; Refill: 3        Discussion    Patient presents today for a CPE.  She will continue creastor for lipid reduction. To modify diet to be low in salt, sugar, and fats. Gave lit on DASH. To start valsartan 160 mg daily. She will monitor bp and report readings. She will have repeat screening cscope 1/2014. To get a mammogram. Healthy clean diet emphasized. She is current on vac but is to get shingrix. She will have dexa prior to next cpe. She will f/u here in 3 months for repeat bp analysis and treatment. cloderm cream for atrophic vag changes w/ itching. Will check vaginosis panel as well.       Patient follows a healthy diet.   Patient follows an adequate exercise regimen. Patient will schedule a mammogram. Colon cancer screening is up to date.   Pap smear performed every 3 years.   Immunizations are up to date.           No future appointments.

## 2021-09-01 LAB
A VAGINAE DNA VAG QL NAA+PROBE: NORMAL SCORE
BVAB2 DNA VAG QL NAA+PROBE: NORMAL SCORE
C ALBICANS DNA VAG QL NAA+PROBE: NEGATIVE
C GLABRATA DNA VAG QL NAA+PROBE: NEGATIVE
MEGA1 DNA VAG QL NAA+PROBE: NORMAL SCORE
T VAGINALIS DNA VAG QL NAA+PROBE: NEGATIVE

## 2021-09-03 RX ORDER — TRIAMCINOLONE ACETONIDE 0.25 MG/G
OINTMENT TOPICAL 2 TIMES DAILY
Qty: 30 G | Refills: 1 | Status: SHIPPED | OUTPATIENT
Start: 2021-09-03 | End: 2022-09-16 | Stop reason: SDUPTHER

## 2021-09-09 ENCOUNTER — HOSPITAL ENCOUNTER (OUTPATIENT)
Dept: MAMMOGRAPHY | Facility: HOSPITAL | Age: 61
Discharge: HOME | End: 2021-09-09
Admitting: INTERNAL MEDICINE

## 2021-09-09 DIAGNOSIS — Z12.31 BREAST CANCER SCREENING BY MAMMOGRAM: ICD-10-CM

## 2021-09-09 PROCEDURE — 77067 SCR MAMMO BI INCL CAD: CPT

## 2021-09-09 PROCEDURE — 77063 BREAST TOMOSYNTHESIS BI: CPT

## 2021-10-25 ENCOUNTER — OFFICE VISIT (OUTPATIENT)
Dept: INTERNAL MEDICINE | Facility: CLINIC | Age: 61
End: 2021-10-25

## 2021-10-25 VITALS
HEART RATE: 90 BPM | WEIGHT: 169 LBS | HEIGHT: 63 IN | DIASTOLIC BLOOD PRESSURE: 62 MMHG | SYSTOLIC BLOOD PRESSURE: 132 MMHG | OXYGEN SATURATION: 97 % | TEMPERATURE: 97.3 F | BODY MASS INDEX: 29.95 KG/M2

## 2021-10-25 DIAGNOSIS — R31.9 HEMATURIA, UNSPECIFIED TYPE: ICD-10-CM

## 2021-10-25 DIAGNOSIS — R31.9 URINARY TRACT INFECTION WITH HEMATURIA, SITE UNSPECIFIED: Primary | ICD-10-CM

## 2021-10-25 DIAGNOSIS — N39.0 URINARY TRACT INFECTION WITH HEMATURIA, SITE UNSPECIFIED: Primary | ICD-10-CM

## 2021-10-25 LAB
BILIRUB BLD-MCNC: NEGATIVE MG/DL
CLARITY, POC: ABNORMAL
COLOR UR: ABNORMAL
EXPIRATION DATE: ABNORMAL
GLUCOSE UR STRIP-MCNC: NEGATIVE MG/DL
KETONES UR QL: NEGATIVE
LEUKOCYTE EST, POC: ABNORMAL
Lab: ABNORMAL
NITRITE UR-MCNC: POSITIVE MG/ML
PH UR: 5 [PH] (ref 5–8)
PROT UR STRIP-MCNC: ABNORMAL MG/DL
RBC # UR STRIP: ABNORMAL /UL
SP GR UR: 1.03 (ref 1–1.03)
UROBILINOGEN UR QL: NORMAL

## 2021-10-25 PROCEDURE — 81003 URINALYSIS AUTO W/O SCOPE: CPT | Performed by: INTERNAL MEDICINE

## 2021-10-25 PROCEDURE — 99214 OFFICE O/P EST MOD 30 MIN: CPT | Performed by: INTERNAL MEDICINE

## 2021-10-25 RX ORDER — NITROFURANTOIN 25; 75 MG/1; MG/1
100 CAPSULE ORAL 2 TIMES DAILY
Qty: 14 CAPSULE | Refills: 0 | Status: SHIPPED | OUTPATIENT
Start: 2021-10-25 | End: 2022-09-16

## 2021-10-28 LAB
APPEARANCE UR: ABNORMAL
BACTERIA #/AREA URNS HPF: ABNORMAL /[HPF]
BACTERIA UR CULT: ABNORMAL
BACTERIA UR CULT: ABNORMAL
BILIRUB UR QL STRIP: NEGATIVE
CASTS URNS QL MICRO: ABNORMAL /LPF
COLOR UR: YELLOW
EPI CELLS #/AREA URNS HPF: ABNORMAL /HPF (ref 0–10)
GLUCOSE UR QL: NEGATIVE
HGB UR QL STRIP: ABNORMAL
KETONES UR QL STRIP: NEGATIVE
LEUKOCYTE ESTERASE UR QL STRIP: ABNORMAL
MICRO URNS: ABNORMAL
NITRITE UR QL STRIP: POSITIVE
OTHER ANTIBIOTIC SUSC ISLT: ABNORMAL
PH UR STRIP: 5.5 [PH] (ref 5–7.5)
PROT UR QL STRIP: ABNORMAL
RBC #/AREA URNS HPF: >30 /HPF (ref 0–2)
SP GR UR: 1.02 (ref 1–1.03)
URINALYSIS REFLEX: ABNORMAL
UROBILINOGEN UR STRIP-MCNC: 0.2 MG/DL (ref 0.2–1)
WBC #/AREA URNS HPF: >30 /HPF (ref 0–5)

## 2021-11-04 DIAGNOSIS — N39.0 URINARY TRACT INFECTION WITH HEMATURIA, SITE UNSPECIFIED: Primary | ICD-10-CM

## 2021-11-04 DIAGNOSIS — R31.9 URINARY TRACT INFECTION WITH HEMATURIA, SITE UNSPECIFIED: Primary | ICD-10-CM

## 2021-11-09 DIAGNOSIS — R31.9 HEMATURIA, UNSPECIFIED TYPE: Primary | ICD-10-CM

## 2021-11-09 DIAGNOSIS — N30.01 ACUTE CYSTITIS WITH HEMATURIA: ICD-10-CM

## 2021-11-09 LAB
APPEARANCE UR: ABNORMAL
BACTERIA #/AREA URNS HPF: NORMAL /[HPF]
BILIRUB UR QL STRIP: NEGATIVE
CASTS URNS QL MICRO: NORMAL /LPF
COLOR UR: YELLOW
EPI CELLS #/AREA URNS HPF: NORMAL /HPF (ref 0–10)
GLUCOSE UR QL: NEGATIVE
HGB UR QL STRIP: ABNORMAL
KETONES UR QL STRIP: NEGATIVE
LEUKOCYTE ESTERASE UR QL STRIP: NEGATIVE
MICRO URNS: ABNORMAL
NITRITE UR QL STRIP: NEGATIVE
PH UR STRIP: 5.5 [PH] (ref 5–7.5)
PROT UR QL STRIP: NEGATIVE
RBC #/AREA URNS HPF: NORMAL /HPF (ref 0–2)
SP GR UR: 1.02 (ref 1–1.03)
UROBILINOGEN UR STRIP-MCNC: 0.2 MG/DL (ref 0.2–1)
WBC #/AREA URNS HPF: NORMAL /HPF (ref 0–5)

## 2021-12-08 ENCOUNTER — TELEPHONE (OUTPATIENT)
Dept: INTERNAL MEDICINE | Facility: CLINIC | Age: 61
End: 2021-12-08

## 2021-12-08 RX ORDER — ROSUVASTATIN CALCIUM 10 MG/1
10 TABLET, FILM COATED ORAL DAILY
Qty: 90 TABLET | Refills: 3 | Status: SHIPPED | OUTPATIENT
Start: 2021-12-08 | End: 2021-12-14

## 2021-12-08 NOTE — TELEPHONE ENCOUNTER
Caller: Brandy El DANIEL    Relationship: Self    Best call back number: [phone]    Requested Prescriptions:   Requested Prescriptions     Pending Prescriptions Disp Refills   • Crestor 10 MG tablet 90 tablet 3     Sig: Take 1 tablet by mouth Daily.        Pharmacy where request should be sent: [facility] AT [facility] - [phone]  - [phone] FX   Additional details provided by patient: HAS 2 DAYS OF MEDS LEFT     Does the patient have less than a 3 day supply:  [x] Yes  [] No    Tyler Barakat Rep   12/08/21 09:45 EST

## 2021-12-09 ENCOUNTER — APPOINTMENT (OUTPATIENT)
Dept: MAMMOGRAPHY | Facility: HOSPITAL | Age: 61
End: 2021-12-09

## 2021-12-14 ENCOUNTER — TELEPHONE (OUTPATIENT)
Dept: INTERNAL MEDICINE | Facility: CLINIC | Age: 61
End: 2021-12-14

## 2021-12-14 RX ORDER — ATORVASTATIN CALCIUM 20 MG/1
20 TABLET, FILM COATED ORAL DAILY
Qty: 90 TABLET | Refills: 1 | Status: SHIPPED | OUTPATIENT
Start: 2021-12-14 | End: 2022-07-05

## 2021-12-14 RX ORDER — ROSUVASTATIN CALCIUM 10 MG/1
10 TABLET, FILM COATED ORAL DAILY
Qty: 90 TABLET | Refills: 3 | Status: SHIPPED | OUTPATIENT
Start: 2021-12-14 | End: 2022-09-16 | Stop reason: SDUPTHER

## 2021-12-14 NOTE — TELEPHONE ENCOUNTER
Caller: Brandy El    Relationship: Self    Best call back number: [phone]    Requested Prescriptions:   Requested Prescriptions     Pending Prescriptions Disp Refills   • Crestor 10 MG tablet 90 tablet 3     Sig: Take 1 tablet by mouth Daily.        Pharmacy where request should be sent: [facility] AT [facility] - [phone]  - [phone] FX     Additional details provided by patient: PHARMACY ADVISED PATIENT THAT THIS MEDICATION IS NOT COVERED BY INSURANCE. MEDICATION IS TOO EXPENSIVE TO COVER OUT OF POCKET. PATIENT IS REQUESTING ALTERNATIVE TO BE CALLED IN ASAP     Does the patient have less than a 3 day supply:  [x] Yes  [] No    Tyler Zavala Rep   12/14/21 12:53 EST

## 2022-01-25 RX ORDER — DESVENLAFAXINE 100 MG/1
TABLET, EXTENDED RELEASE ORAL
Qty: 30 TABLET | Refills: 5 | Status: SHIPPED | OUTPATIENT
Start: 2022-01-25 | End: 2022-08-26

## 2022-03-09 ENCOUNTER — TELEPHONE (OUTPATIENT)
Dept: INTERNAL MEDICINE | Facility: CLINIC | Age: 62
End: 2022-03-09

## 2022-03-09 DIAGNOSIS — F41.9 ANXIETY: ICD-10-CM

## 2022-03-09 RX ORDER — CLONAZEPAM 0.5 MG/1
0.5 TABLET ORAL 2 TIMES DAILY PRN
Qty: 60 TABLET | Refills: 0 | Status: SHIPPED | OUTPATIENT
Start: 2022-03-09 | End: 2022-07-01

## 2022-07-01 DIAGNOSIS — F41.9 ANXIETY: ICD-10-CM

## 2022-07-01 RX ORDER — CLONAZEPAM 0.5 MG/1
TABLET ORAL
Qty: 30 TABLET | Refills: 0 | Status: SHIPPED | OUTPATIENT
Start: 2022-07-01 | End: 2022-08-26 | Stop reason: SDUPTHER

## 2022-07-01 NOTE — TELEPHONE ENCOUNTER
Covered work for Dr Woodard,   Clonazepam refilled for #30   juwan reviewed and is appropriate  Needs updated contract   Has an appt in sept with Dr Woodard

## 2022-07-05 RX ORDER — ATORVASTATIN CALCIUM 20 MG/1
TABLET, FILM COATED ORAL
Qty: 90 TABLET | Refills: 1 | Status: SHIPPED | OUTPATIENT
Start: 2022-07-05 | End: 2022-09-16

## 2022-08-25 ENCOUNTER — TELEPHONE (OUTPATIENT)
Dept: INTERNAL MEDICINE | Facility: CLINIC | Age: 62
End: 2022-08-25

## 2022-08-25 DIAGNOSIS — F41.9 ANXIETY: ICD-10-CM

## 2022-08-25 RX ORDER — CLONAZEPAM 0.5 MG/1
0.5 TABLET ORAL 2 TIMES DAILY PRN
Qty: 30 TABLET | Refills: 0 | Status: CANCELLED | OUTPATIENT
Start: 2022-08-25

## 2022-08-26 DIAGNOSIS — F41.9 ANXIETY: ICD-10-CM

## 2022-08-26 RX ORDER — DESVENLAFAXINE 100 MG/1
TABLET, EXTENDED RELEASE ORAL
Qty: 30 TABLET | Refills: 5 | Status: SHIPPED | OUTPATIENT
Start: 2022-08-26 | End: 2023-01-09

## 2022-08-26 RX ORDER — CLONAZEPAM 0.5 MG/1
0.5 TABLET ORAL 2 TIMES DAILY PRN
Qty: 30 TABLET | Refills: 2 | Status: SHIPPED | OUTPATIENT
Start: 2022-08-26 | End: 2023-01-11 | Stop reason: SDUPTHER

## 2022-09-06 DIAGNOSIS — Z00.00 HEALTH CARE MAINTENANCE: ICD-10-CM

## 2022-09-06 DIAGNOSIS — E78.5 HYPERLIPIDEMIA, UNSPECIFIED HYPERLIPIDEMIA TYPE: Primary | ICD-10-CM

## 2022-09-15 LAB
ALBUMIN SERPL-MCNC: 4.5 G/DL (ref 3.5–5.2)
ALBUMIN/GLOB SERPL: 2 G/DL
ALP SERPL-CCNC: 57 U/L (ref 39–117)
ALT SERPL-CCNC: 15 U/L (ref 1–33)
APPEARANCE UR: CLEAR
AST SERPL-CCNC: 13 U/L (ref 1–32)
BACTERIA #/AREA URNS HPF: NORMAL /HPF
BACTERIA UR CULT: NORMAL
BASOPHILS # BLD AUTO: 0.03 10*3/MM3 (ref 0–0.2)
BASOPHILS NFR BLD AUTO: 0.7 % (ref 0–1.5)
BILIRUB SERPL-MCNC: 0.5 MG/DL (ref 0–1.2)
BILIRUB UR QL STRIP: NEGATIVE
BUN SERPL-MCNC: 19 MG/DL (ref 8–23)
BUN/CREAT SERPL: 22.9 (ref 7–25)
CALCIUM SERPL-MCNC: 9.5 MG/DL (ref 8.6–10.5)
CASTS URNS QL MICRO: NORMAL /LPF
CHLORIDE SERPL-SCNC: 105 MMOL/L (ref 98–107)
CHOLEST SERPL-MCNC: 179 MG/DL (ref 0–200)
CO2 SERPL-SCNC: 28.1 MMOL/L (ref 22–29)
COLOR UR: YELLOW
CREAT SERPL-MCNC: 0.83 MG/DL (ref 0.57–1)
EGFRCR-CYS SERPLBLD CKD-EPI 2021: 80.8 ML/MIN/1.73
EOSINOPHIL # BLD AUTO: 0.13 10*3/MM3 (ref 0–0.4)
EOSINOPHIL NFR BLD AUTO: 3 % (ref 0.3–6.2)
EPI CELLS #/AREA URNS HPF: NORMAL /HPF (ref 0–10)
ERYTHROCYTE [DISTWIDTH] IN BLOOD BY AUTOMATED COUNT: 12.7 % (ref 12.3–15.4)
GLOBULIN SER CALC-MCNC: 2.2 GM/DL
GLUCOSE SERPL-MCNC: 99 MG/DL (ref 65–99)
GLUCOSE UR QL STRIP: NEGATIVE
HCT VFR BLD AUTO: 39 % (ref 34–46.6)
HDLC SERPL-MCNC: 50 MG/DL (ref 40–60)
HGB BLD-MCNC: 13.2 G/DL (ref 12–15.9)
HGB UR QL STRIP: ABNORMAL
IMM GRANULOCYTES # BLD AUTO: 0.01 10*3/MM3 (ref 0–0.05)
IMM GRANULOCYTES NFR BLD AUTO: 0.2 % (ref 0–0.5)
KETONES UR QL STRIP: NEGATIVE
LDLC SERPL CALC-MCNC: 106 MG/DL (ref 0–100)
LEUKOCYTE ESTERASE UR QL STRIP: ABNORMAL
LYMPHOCYTES # BLD AUTO: 1.08 10*3/MM3 (ref 0.7–3.1)
LYMPHOCYTES NFR BLD AUTO: 25.2 % (ref 19.6–45.3)
MCH RBC QN AUTO: 29.6 PG (ref 26.6–33)
MCHC RBC AUTO-ENTMCNC: 33.8 G/DL (ref 31.5–35.7)
MCV RBC AUTO: 87.4 FL (ref 79–97)
MICRO URNS: ABNORMAL
MONOCYTES # BLD AUTO: 0.37 10*3/MM3 (ref 0.1–0.9)
MONOCYTES NFR BLD AUTO: 8.6 % (ref 5–12)
NEUTROPHILS # BLD AUTO: 2.67 10*3/MM3 (ref 1.7–7)
NEUTROPHILS NFR BLD AUTO: 62.3 % (ref 42.7–76)
NITRITE UR QL STRIP: NEGATIVE
NRBC BLD AUTO-RTO: 0 /100 WBC (ref 0–0.2)
PH UR STRIP: 5.5 [PH] (ref 5–7.5)
PLATELET # BLD AUTO: 204 10*3/MM3 (ref 140–450)
POTASSIUM SERPL-SCNC: 4.2 MMOL/L (ref 3.5–5.2)
PROT SERPL-MCNC: 6.7 G/DL (ref 6–8.5)
PROT UR QL STRIP: NEGATIVE
RBC # BLD AUTO: 4.46 10*6/MM3 (ref 3.77–5.28)
RBC #/AREA URNS HPF: NORMAL /HPF (ref 0–2)
SODIUM SERPL-SCNC: 142 MMOL/L (ref 136–145)
SP GR UR STRIP: 1.02 (ref 1–1.03)
TRIGL SERPL-MCNC: 132 MG/DL (ref 0–150)
TSH SERPL DL<=0.005 MIU/L-ACNC: 1.58 UIU/ML (ref 0.27–4.2)
URINALYSIS REFLEX: ABNORMAL
UROBILINOGEN UR STRIP-MCNC: 0.2 MG/DL (ref 0.2–1)
VLDLC SERPL CALC-MCNC: 23 MG/DL (ref 5–40)
WBC # BLD AUTO: 4.29 10*3/MM3 (ref 3.4–10.8)
WBC #/AREA URNS HPF: NORMAL /HPF (ref 0–5)

## 2022-09-16 ENCOUNTER — OFFICE VISIT (OUTPATIENT)
Dept: INTERNAL MEDICINE | Facility: CLINIC | Age: 62
End: 2022-09-16

## 2022-09-16 VITALS
WEIGHT: 165 LBS | HEIGHT: 63 IN | SYSTOLIC BLOOD PRESSURE: 152 MMHG | BODY MASS INDEX: 29.23 KG/M2 | DIASTOLIC BLOOD PRESSURE: 88 MMHG | HEART RATE: 70 BPM

## 2022-09-16 DIAGNOSIS — F41.9 ANXIETY: ICD-10-CM

## 2022-09-16 DIAGNOSIS — E78.5 HYPERLIPIDEMIA, UNSPECIFIED HYPERLIPIDEMIA TYPE: ICD-10-CM

## 2022-09-16 DIAGNOSIS — Z12.31 BREAST CANCER SCREENING BY MAMMOGRAM: ICD-10-CM

## 2022-09-16 DIAGNOSIS — F32.A DEPRESSION, UNSPECIFIED DEPRESSION TYPE: ICD-10-CM

## 2022-09-16 DIAGNOSIS — H60.93 OTITIS EXTERNA OF BOTH EARS, UNSPECIFIED CHRONICITY, UNSPECIFIED TYPE: ICD-10-CM

## 2022-09-16 DIAGNOSIS — Z00.00 HEALTH CARE MAINTENANCE: Primary | ICD-10-CM

## 2022-09-16 PROCEDURE — 90715 TDAP VACCINE 7 YRS/> IM: CPT | Performed by: INTERNAL MEDICINE

## 2022-09-16 PROCEDURE — 99396 PREV VISIT EST AGE 40-64: CPT | Performed by: INTERNAL MEDICINE

## 2022-09-16 PROCEDURE — 90471 IMMUNIZATION ADMIN: CPT | Performed by: INTERNAL MEDICINE

## 2022-09-16 RX ORDER — ROSUVASTATIN CALCIUM 10 MG/1
10 TABLET, FILM COATED ORAL DAILY
Qty: 90 TABLET | Refills: 3 | Status: SHIPPED | OUTPATIENT
Start: 2022-09-16 | End: 2023-01-16

## 2022-09-16 RX ORDER — TRIAMCINOLONE ACETONIDE 0.25 MG/G
OINTMENT TOPICAL 2 TIMES DAILY
Qty: 30 G | Refills: 1 | Status: SHIPPED | OUTPATIENT
Start: 2022-09-16 | End: 2023-01-09

## 2022-09-16 RX ORDER — VALSARTAN 320 MG/1
320 TABLET ORAL DAILY
Qty: 90 TABLET | Refills: 3 | Status: SHIPPED | OUTPATIENT
Start: 2022-09-16

## 2022-09-16 RX ORDER — BUSPIRONE HYDROCHLORIDE 10 MG/1
10 TABLET ORAL 3 TIMES DAILY
Qty: 180 TABLET | Refills: 3 | Status: SHIPPED | OUTPATIENT
Start: 2022-09-16 | End: 2023-01-09

## 2022-09-16 NOTE — PROGRESS NOTES
"Subjective   CPE  Anxiety/ depression  Hyperlipidemia    Brandy El is a 62 y.o. female who presents for a complete physical exam, to review chronic issues, and to discuss acute needs. Patient is having grief. Lost spouse in April and still struggling. She has been to [facility] but this was not beneficial. She is following w/ a private therapist at this time. She is unable to sleep at night. Will wake after about an hour. She only drinks small amount caffeine and drinks this in the morning.  Patient is on pristiq and thakes this daily. She notes more anxious mood. Taking clonazepam about 4-5 days in the week for acute anxiety w/ some benefit. \"it helps\".       Review of Systems   Constitutional: Negative.    HENT: Negative.    Eyes: Negative.    Respiratory: Negative.    Cardiovascular: Negative.    Gastrointestinal: Negative.    Endocrine: Negative.    Genitourinary: Negative.    Musculoskeletal: Negative.    Skin: Negative.    Allergic/Immunologic: Negative.    Neurological: Negative.    Hematological: Negative.    Psychiatric/Behavioral: The patient is nervous/anxious.         Depressed mood         The following portions of the patient's history were reviewed and updated as appropriate: allergies, current medications, past family history, past medical history, past social history, past surgical history and problem list.     Patient Active Problem List   Diagnosis   • Health care maintenance   • Hyperlipidemia   • History of MRSA infection   • Dyspepsia   • Change in bowel habits   • FH: esophageal cancer       Past Medical History:   Diagnosis Date   • Anxiety    • ASCUS of cervix with negative high risk HPV 09/2001   • ASCUS of cervix with negative high risk HPV 09/2005   • Depression    • Essential hematuria    • Gait disturbance     R foot drop   • Hyperlipidemia    • Postmenopausal    • Vaginal delivery        Past Surgical History:   Procedure Laterality Date   • ANKLE SURGERY     • BACK SURGERY     • " COLONOSCOPY  10/22/2013    tics, IH, HP   • COLONOSCOPY N/A 12/3/2018    Procedure: COLONOSCOPY TO CECUM AND INTO TERMINAL ILEUM WITH COLD BIOPSIES AND COLD BIOPSY POLYPECTOMY;  Surgeon: Syd Emerson MD;  Location: [facility] ENDOSCOPY;  Service: Gastroenterology   • ENDOSCOPY N/A 12/3/2018    Procedure: ESOPHAGOGASTRODUODENOSCOPY WITH COLD BIOPSIES;  Surgeon: Syd Emerson MD;  Location: [facility] ENDOSCOPY;  Service: Gastroenterology   • LAPAROSCOPIC TUBAL LIGATION Bilateral    • TEAR DUCT SURGERY  06/2017       Family History   Problem Relation Age of Onset   • COPD Mother    • Heart disease Father    • Heart disease Brother    • Cancer Brother    • COPD Brother    • Breast cancer Sister    • No Known Problems Daughter    • No Known Problems Son    • No Known Problems Maternal Grandmother    • No Known Problems Paternal Grandmother    • No Known Problems Maternal Aunt    • No Known Problems Paternal Aunt    • BRCA 1/2 Neg Hx    • Colon cancer Neg Hx    • Endometrial cancer Neg Hx    • Ovarian cancer Neg Hx        Social History     Socioeconomic History   • Marital status:    Tobacco Use   • Smoking status: Never Smoker   • Smokeless tobacco: Never Used   Substance and Sexual Activity   • Alcohol use: Yes     Comment: 2 x weekly   • Drug use: No   • Sexual activity: Yes     Birth control/protection: Post-menopausal       Current Outpatient Medications on File Prior to Visit   Medication Sig Dispense Refill   • atorvastatin (LIPITOR) 20 MG tablet TAKE ONE TABLET BY MOUTH DAILY 90 tablet 1   • clonazePAM (KlonoPIN) 0.5 MG tablet Take 1 tablet by mouth 2 (Two) Times a Day As Needed for Anxiety. 30 tablet 2   • desvenlafaxine (PRISTIQ) 100 MG 24 hr tablet TAKE ONE TABLET BY MOUTH DAILY 30 tablet 5   • valsartan (Diovan) 160 MG tablet Take 1 tablet by mouth Daily. 90 tablet 3   • benzonatate (TESSALON) 200 MG capsule Take 1 capsule by mouth 3 (Three) Times a Day As Needed for Cough. 30 capsule 0   •  "Chlorcyclizine-Pseudoephed (Stahist AD) 25-60 MG tablet Take 1 tablet by mouth Every 8 (Eight) Hours As Needed (congestion). 30 tablet 0   • Clocortolone Pivalate 0.1 % cream Apply 1 application topically to the appropriate area as directed Daily. 45 g 1   • Crestor 10 MG tablet Take 1 tablet by mouth Daily. (Patient not taking: Reported on 5/24/2022) 90 tablet 3   • nitrofurantoin, macrocrystal-monohydrate, (Macrobid) 100 MG capsule Take 1 capsule by mouth 2 (Two) Times a Day. 14 capsule 0   • triamcinolone (KENALOG) 0.025 % ointment Apply  topically to the appropriate area as directed 2 (Two) Times a Day. 30 g 1     No current facility-administered medications on file prior to visit.       Allergies   Allergen Reactions   • Phenergan [Promethazine Hcl] Anxiety       Immunization History   Administered Date(s) Administered   • COVID-19 (PFIZER) PURPLE CAP 03/01/2021, 04/01/2021   • Hepatitis A 08/13/2018, 08/19/2019   • Tdap 06/25/2013       Objective     /88   Pulse 70   Ht 160 cm (63\")   Wt 74.8 kg (165 lb)   BMI 29.23 kg/m²     Physical Exam  Vitals and nursing note reviewed.   Constitutional:       Appearance: Normal appearance. She is well-developed.   HENT:      Head: Normocephalic and atraumatic.      Ears:      Comments: B fungal changes   TM obscured       Nose: Nose normal.      Mouth/Throat:      Mouth: Mucous membranes are moist.   Eyes:      Extraocular Movements: Extraocular movements intact.      Pupils: Pupils are equal, round, and reactive to light.   Cardiovascular:      Rate and Rhythm: Normal rate and regular rhythm.      Pulses: Normal pulses.      Heart sounds: Normal heart sounds.   Pulmonary:      Effort: Pulmonary effort is normal. No respiratory distress.      Breath sounds: Normal breath sounds.   Abdominal:      General: Abdomen is flat.      Palpations: Abdomen is soft.   Musculoskeletal:         General: Normal range of motion.      Cervical back: Normal range of motion and " neck supple.   Skin:     General: Skin is warm and dry.   Neurological:      General: No focal deficit present.      Mental Status: She is alert and oriented to person, place, and time.   Psychiatric:         Mood and Affect: Mood normal.         Behavior: Behavior normal.         Thought Content: Thought content normal.         Judgment: Judgment normal.         Assessment & Plan   Diagnoses and all orders for this visit:    1. Health care maintenance (Primary)    2. Hyperlipidemia, unspecified hyperlipidemia type    3. Anxiety    4. Depression, unspecified depression type    Other orders  -     triamcinolone (KENALOG) 0.025 % ointment; Apply  topically to the appropriate area as directed 2 (Two) Times a Day.  Dispense: 30 g; Refill: 1  -     Crestor 10 MG tablet; Take 1 tablet by mouth Daily.  Dispense: 90 tablet; Refill: 3  -     valsartan (DIOVAN) 320 MG tablet; Take 1 tablet by mouth Daily.  Dispense: 90 tablet; Refill: 3  -     busPIRone (BUSPAR) 10 MG tablet; Take 1 tablet by mouth 3 (Three) Times a Day.  Dispense: 180 tablet; Refill: 3        Discussion    Patient presents today for a CPE.  She will continue crestor daily. She is to increase diovan to 320 mg daily. Patient follows a healthy diet.   Patient follows an adequate exercise regimen. Mammogram is up to date.   Colon cancer screening is up to date.   Pap smear performed every 5 years.  She is and given tdap today. She is to get covid and flu vac. To schedule for shingrix. She is continue counseling. Will add buspar to pristiq. Consider genesigt testing. She is encouraged to start new healthy activites and creative activities. s fungal in ears B.            No future appointments.

## 2022-09-19 ENCOUNTER — HOSPITAL ENCOUNTER (OUTPATIENT)
Dept: MAMMOGRAPHY | Facility: HOSPITAL | Age: 62
Discharge: HOME | End: 2022-09-19
Admitting: INTERNAL MEDICINE

## 2022-09-19 DIAGNOSIS — Z12.31 BREAST CANCER SCREENING BY MAMMOGRAM: ICD-10-CM

## 2022-09-19 PROCEDURE — 77063 BREAST TOMOSYNTHESIS BI: CPT

## 2022-09-19 PROCEDURE — 77067 SCR MAMMO BI INCL CAD: CPT

## 2023-01-09 ENCOUNTER — OFFICE VISIT (OUTPATIENT)
Dept: INTERNAL MEDICINE | Facility: CLINIC | Age: 63
End: 2023-01-09
Payer: COMMERCIAL

## 2023-01-09 VITALS
WEIGHT: 177 LBS | SYSTOLIC BLOOD PRESSURE: 152 MMHG | BODY MASS INDEX: 31.36 KG/M2 | HEART RATE: 82 BPM | DIASTOLIC BLOOD PRESSURE: 82 MMHG | HEIGHT: 63 IN

## 2023-01-09 DIAGNOSIS — E78.5 HYPERLIPIDEMIA, UNSPECIFIED HYPERLIPIDEMIA TYPE: ICD-10-CM

## 2023-01-09 DIAGNOSIS — F41.9 ANXIETY: Primary | ICD-10-CM

## 2023-01-09 DIAGNOSIS — I10 HYPERTENSION, UNSPECIFIED TYPE: ICD-10-CM

## 2023-01-09 DIAGNOSIS — F32.A DEPRESSION, UNSPECIFIED DEPRESSION TYPE: ICD-10-CM

## 2023-01-09 PROCEDURE — 99214 OFFICE O/P EST MOD 30 MIN: CPT | Performed by: INTERNAL MEDICINE

## 2023-01-09 RX ORDER — FLUTICASONE PROPIONATE 50 MCG
2 SPRAY, SUSPENSION (ML) NASAL DAILY
Qty: 16 G | Refills: 3 | Status: SHIPPED | OUTPATIENT
Start: 2023-01-09

## 2023-01-09 RX ORDER — AZELASTINE 1 MG/ML
2 SPRAY, METERED NASAL DAILY
Qty: 30 ML | Refills: 12 | Status: SHIPPED | OUTPATIENT
Start: 2023-01-09

## 2023-01-09 RX ORDER — ATORVASTATIN CALCIUM 20 MG/1
TABLET, FILM COATED ORAL
COMMUNITY
Start: 2022-10-13 | End: 2023-01-13

## 2023-01-09 RX ORDER — HYDROXYZINE HYDROCHLORIDE 25 MG/1
TABLET, FILM COATED ORAL
COMMUNITY
Start: 2022-12-14 | End: 2023-01-25

## 2023-01-09 RX ORDER — BUPROPION HYDROCHLORIDE 150 MG/1
TABLET ORAL
COMMUNITY
Start: 2022-12-14

## 2023-01-09 RX ORDER — PROPRANOLOL HCL 60 MG
60 CAPSULE, EXTENDED RELEASE 24HR ORAL DAILY
Qty: 30 CAPSULE | Refills: 4 | Status: SHIPPED | OUTPATIENT
Start: 2023-01-09

## 2023-01-09 NOTE — PROGRESS NOTES
Chief Complaint   Patient presents with   • Hyperlipidemia   • Cough   • Depression   • covid 19       History of Present Illness   Brandy El is a 62 y.o. female presents for routine follow up evaluation with acute needs. Patient has depression and grief. She notes that she is more tearful recently. Seeing a psychiatrist. She switched from pristiq to wellbutrin. Taking clonazepam most days. She has hydroxyzine. She has limited physical activity. She is meeting w/ a therapist as well. She has a meeting w/ psychiatry this week.   Diagnosed w/ covid 19 6 days ago. She is no longer having symptoms at this time.  However she does note that cough is thick and \"hard to get anything up\" can have a globus sensation. Nose is \"always blocked w/ drainage. Limited hydration w/ water.   On valsartan for htn. bp at home 130s-140s/70s-80s.       The following portions of the patient's history were reviewed and updated as appropriate: allergies, current medications, past family history, past medical history, past social history, past surgical history and problem list.  Current Outpatient Medications on File Prior to Visit   Medication Sig Dispense Refill   • atorvastatin (LIPITOR) 20 MG tablet      • buPROPion XL (WELLBUTRIN XL) 150 MG 24 hr tablet      • clonazePAM (KlonoPIN) 0.5 MG tablet Take 1 tablet by mouth 2 (Two) Times a Day As Needed for Anxiety. 30 tablet 2   • hydrOXYzine (ATARAX) 25 MG tablet      • valsartan (DIOVAN) 320 MG tablet Take 1 tablet by mouth Daily. 90 tablet 3   • Crestor 10 MG tablet Take 1 tablet by mouth Daily. 90 tablet 3   • [DISCONTINUED] busPIRone (BUSPAR) 10 MG tablet Take 1 tablet by mouth 3 (Three) Times a Day. 180 tablet 3   • [DISCONTINUED] desvenlafaxine (PRISTIQ) 100 MG 24 hr tablet TAKE ONE TABLET BY MOUTH DAILY 30 tablet 5   • [DISCONTINUED] neomycin-polymyxin-hydrocortisone (CORTISPORIN) 3.5-58543-4 otic solution Administer 3 drops into both ears 4 (Four) Times a Day. 10 mL 1   •  [DISCONTINUED] triamcinolone (KENALOG) 0.025 % ointment Apply  topically to the appropriate area as directed 2 (Two) Times a Day. 30 g 1     No current facility-administered medications on file prior to visit.     Review of Systems   Constitutional: Positive for fatigue.   HENT: Positive for rhinorrhea and sore throat.    Eyes: Negative.    Respiratory: Positive for cough.    Cardiovascular: Negative.    Gastrointestinal: Negative.    Endocrine: Negative.    Genitourinary: Negative.    Musculoskeletal: Negative.    Allergic/Immunologic: Positive for environmental allergies.   Neurological: Negative.    Hematological: Negative.    Psychiatric/Behavioral: Positive for sleep disturbance.        Depressed mood         Objective   Physical Exam  Vitals and nursing note reviewed.   Constitutional:       Appearance: Normal appearance. She is well-developed.   HENT:      Head: Normocephalic and atraumatic.      Right Ear: Tympanic membrane and external ear normal.      Left Ear: Tympanic membrane and external ear normal.      Nose: Nose normal.      Mouth/Throat:      Mouth: Mucous membranes are moist.   Eyes:      Pupils: Pupils are equal, round, and reactive to light.   Cardiovascular:      Rate and Rhythm: Normal rate and regular rhythm.      Pulses: Normal pulses.      Heart sounds: Normal heart sounds.   Pulmonary:      Effort: Pulmonary effort is normal. No respiratory distress.      Breath sounds: Normal breath sounds.   Abdominal:      Palpations: Abdomen is soft.   Musculoskeletal:         General: Normal range of motion.      Cervical back: Normal range of motion and neck supple.   Skin:     General: Skin is warm and dry.   Neurological:      General: No focal deficit present.      Mental Status: She is alert and oriented to person, place, and time.   Psychiatric:      Comments: Tearful/ grieving            /82   Pulse 82   Ht 160 cm (63\")   Wt 80.3 kg (177 lb)   BMI 31.35 kg/m²     Assessment & Plan    Diagnoses and all orders for this visit:    Anxiety    Depression, unspecified depression type    Hypertension, unspecified type    Other orders  -     atorvastatin (LIPITOR) 20 MG tablet  -     buPROPion XL (WELLBUTRIN XL) 150 MG 24 hr tablet  -     hydrOXYzine (ATARAX) 25 MG tablet  -     fluticasone (FLONASE) 50 MCG/ACT nasal spray; 2 sprays into the nostril(s) as directed by provider Daily.  -     azelastine (ASTELIN) 0.1 % nasal spray; 2 sprays into the nostril(s) as directed by provider Daily. Use in each nostril as directed  -     propranolol LA (INDERAL LA) 60 MG 24 hr capsule; Take 1 capsule by mouth Daily.      Patient w/ anxiety, depression, htn, recent covid 19. She will start nasal steroid and nasal antihistamine for sinus drainage. Will start propranolol at night for htn. Hopefully this gives benefit for sleep as well. To f/u w/ psychiatry on wed as scheduled. Will test lipid levels given history hypertension. mucinex bid prn thick secretions.              Answers for HPI/ROS submitted by the patient on 1/6/2023  Please describe your symptoms.: Follow up visit  Have you had these symptoms before?: Yes  How long have you been having these symptoms?: Greater than 2 weeks  What is the primary reason for your visit?: Other

## 2023-01-10 LAB
25(OH)D3+25(OH)D2 SERPL-MCNC: 20 NG/ML (ref 30–100)
ALBUMIN SERPL-MCNC: 4.8 G/DL (ref 3.5–5.2)
ALBUMIN/GLOB SERPL: 2.1 G/DL
ALP SERPL-CCNC: 72 U/L (ref 39–117)
ALT SERPL-CCNC: 26 U/L (ref 1–33)
AST SERPL-CCNC: 18 U/L (ref 1–32)
BASOPHILS # BLD AUTO: 0.04 10*3/MM3 (ref 0–0.2)
BASOPHILS NFR BLD AUTO: 0.6 % (ref 0–1.5)
BILIRUB SERPL-MCNC: 0.5 MG/DL (ref 0–1.2)
BUN SERPL-MCNC: 21 MG/DL (ref 8–23)
BUN/CREAT SERPL: 27.3 (ref 7–25)
CALCIUM SERPL-MCNC: 9.8 MG/DL (ref 8.6–10.5)
CHLORIDE SERPL-SCNC: 104 MMOL/L (ref 98–107)
CHOLEST SERPL-MCNC: 206 MG/DL (ref 0–200)
CO2 SERPL-SCNC: 27.5 MMOL/L (ref 22–29)
CREAT SERPL-MCNC: 0.77 MG/DL (ref 0.57–1)
EGFRCR SERPLBLD CKD-EPI 2021: 88.4 ML/MIN/1.73
EOSINOPHIL # BLD AUTO: 0.13 10*3/MM3 (ref 0–0.4)
EOSINOPHIL NFR BLD AUTO: 2.1 % (ref 0.3–6.2)
ERYTHROCYTE [DISTWIDTH] IN BLOOD BY AUTOMATED COUNT: 12.3 % (ref 12.3–15.4)
GLOBULIN SER CALC-MCNC: 2.3 GM/DL
GLUCOSE SERPL-MCNC: 100 MG/DL (ref 65–99)
HCT VFR BLD AUTO: 40.6 % (ref 34–46.6)
HDLC SERPL-MCNC: 57 MG/DL (ref 40–60)
HGB BLD-MCNC: 13.5 G/DL (ref 12–15.9)
IMM GRANULOCYTES # BLD AUTO: 0.05 10*3/MM3 (ref 0–0.05)
IMM GRANULOCYTES NFR BLD AUTO: 0.8 % (ref 0–0.5)
LDLC SERPL CALC-MCNC: 118 MG/DL (ref 0–100)
LDLC/HDLC SERPL: 1.99 {RATIO}
LYMPHOCYTES # BLD AUTO: 1.1 10*3/MM3 (ref 0.7–3.1)
LYMPHOCYTES NFR BLD AUTO: 17.5 % (ref 19.6–45.3)
MCH RBC QN AUTO: 30 PG (ref 26.6–33)
MCHC RBC AUTO-ENTMCNC: 33.3 G/DL (ref 31.5–35.7)
MCV RBC AUTO: 90.2 FL (ref 79–97)
MONOCYTES # BLD AUTO: 0.45 10*3/MM3 (ref 0.1–0.9)
MONOCYTES NFR BLD AUTO: 7.2 % (ref 5–12)
NEUTROPHILS # BLD AUTO: 4.52 10*3/MM3 (ref 1.7–7)
NEUTROPHILS NFR BLD AUTO: 71.8 % (ref 42.7–76)
NRBC BLD AUTO-RTO: 0 /100 WBC (ref 0–0.2)
PLATELET # BLD AUTO: 250 10*3/MM3 (ref 140–450)
POTASSIUM SERPL-SCNC: 4.5 MMOL/L (ref 3.5–5.2)
PROT SERPL-MCNC: 7.1 G/DL (ref 6–8.5)
RBC # BLD AUTO: 4.5 10*6/MM3 (ref 3.77–5.28)
SODIUM SERPL-SCNC: 142 MMOL/L (ref 136–145)
TRIGL SERPL-MCNC: 178 MG/DL (ref 0–150)
TSH SERPL DL<=0.005 MIU/L-ACNC: 1.71 UIU/ML (ref 0.27–4.2)
VIT B12 SERPL-MCNC: 702 PG/ML (ref 211–946)
VLDLC SERPL CALC-MCNC: 31 MG/DL (ref 5–40)
WBC # BLD AUTO: 6.29 10*3/MM3 (ref 3.4–10.8)

## 2023-01-11 DIAGNOSIS — F41.9 ANXIETY: ICD-10-CM

## 2023-01-11 RX ORDER — CLONAZEPAM 0.5 MG/1
0.5 TABLET ORAL 2 TIMES DAILY PRN
Qty: 30 TABLET | Refills: 2 | Status: SHIPPED | OUTPATIENT
Start: 2023-01-11 | End: 2023-01-16 | Stop reason: SDUPTHER

## 2023-01-11 NOTE — TELEPHONE ENCOUNTER
Caller: Brandy El    Relationship: Self    Best call back number: [phone]    Requested Prescriptions:   Requested Prescriptions     Pending Prescriptions Disp Refills   • clonazePAM (KlonoPIN) 0.5 MG tablet 30 tablet 2     Sig: Take 1 tablet by mouth 2 (Two) Times a Day As Needed for Anxiety.        Pharmacy where request should be sent: [facility] [id number] [address] AT [facility] [phone] [facility] [phone] FX     Additional details provided by patient: THE PATIENT ONLY HAS A COUPLE OF DAYS OF THIS MEDICATION LEFT.     Does the patient have less than a 3 day supply:  [x] Yes  [] No    Would you like a call back once the refill request has been completed: [x] Yes [] No    If the office needs to give you a call back, can they leave a voicemail: [x] Yes [] No    Tyler Drake Rep   01/11/23 15:08 EST

## 2023-01-16 ENCOUNTER — TELEPHONE (OUTPATIENT)
Dept: INTERNAL MEDICINE | Facility: CLINIC | Age: 63
End: 2023-01-16
Payer: COMMERCIAL

## 2023-01-16 DIAGNOSIS — F41.9 ANXIETY: ICD-10-CM

## 2023-01-16 RX ORDER — ATORVASTATIN CALCIUM 20 MG/1
TABLET, FILM COATED ORAL
Qty: 90 TABLET | Refills: 1 | Status: SHIPPED | OUTPATIENT
Start: 2023-01-16

## 2023-01-16 RX ORDER — CLONAZEPAM 0.5 MG/1
0.5 TABLET ORAL 2 TIMES DAILY PRN
Qty: 30 TABLET | Refills: 2 | Status: SHIPPED | OUTPATIENT
Start: 2023-01-16

## 2023-01-16 NOTE — TELEPHONE ENCOUNTER
Pt informed    ----- Message from Tiffanie Woodard MD sent at 1/13/2023  4:48 PM EST -----  Blood counts, kidney, liver, and electrolyte levels are normal. Vitamin d is low. Take 2000 iu vitamin d3 daily. Triglycerides are elevated. Adopt a diet that is low in fat content and maintain healthy physical activity.   [place]

## 2023-01-25 ENCOUNTER — OFFICE VISIT (OUTPATIENT)
Dept: INTERNAL MEDICINE | Facility: CLINIC | Age: 63
End: 2023-01-25
Payer: COMMERCIAL

## 2023-01-25 VITALS
SYSTOLIC BLOOD PRESSURE: 126 MMHG | TEMPERATURE: 98.5 F | HEIGHT: 63 IN | WEIGHT: 181 LBS | BODY MASS INDEX: 32.07 KG/M2 | DIASTOLIC BLOOD PRESSURE: 84 MMHG | OXYGEN SATURATION: 97 % | HEART RATE: 81 BPM

## 2023-01-25 DIAGNOSIS — J02.9 SORE THROAT: ICD-10-CM

## 2023-01-25 DIAGNOSIS — R05.8 POST-VIRAL COUGH SYNDROME: Primary | ICD-10-CM

## 2023-01-25 DIAGNOSIS — R05.9 COUGH, UNSPECIFIED TYPE: ICD-10-CM

## 2023-01-25 LAB
EXPIRATION DATE: NORMAL
EXPIRATION DATE: NORMAL
FLUAV AG UPPER RESP QL IA.RAPID: NOT DETECTED
FLUBV AG UPPER RESP QL IA.RAPID: NOT DETECTED
INTERNAL CONTROL: NORMAL
INTERNAL CONTROL: NORMAL
Lab: NORMAL
Lab: NORMAL
S PYO AG THROAT QL: NEGATIVE
SARS-COV-2 AG UPPER RESP QL IA.RAPID: NOT DETECTED

## 2023-01-25 PROCEDURE — 87880 STREP A ASSAY W/OPTIC: CPT | Performed by: STUDENT IN AN ORGANIZED HEALTH CARE EDUCATION/TRAINING PROGRAM

## 2023-01-25 PROCEDURE — 99213 OFFICE O/P EST LOW 20 MIN: CPT | Performed by: STUDENT IN AN ORGANIZED HEALTH CARE EDUCATION/TRAINING PROGRAM

## 2023-01-25 PROCEDURE — 87428 SARSCOV & INF VIR A&B AG IA: CPT | Performed by: STUDENT IN AN ORGANIZED HEALTH CARE EDUCATION/TRAINING PROGRAM

## 2023-01-25 RX ORDER — BENZONATATE 100 MG/1
100 CAPSULE ORAL 3 TIMES DAILY PRN
Qty: 30 CAPSULE | Refills: 0 | Status: SHIPPED | OUTPATIENT
Start: 2023-01-25

## 2023-01-25 RX ORDER — ALBUTEROL SULFATE 90 UG/1
1 AEROSOL, METERED RESPIRATORY (INHALATION) EVERY 4 HOURS PRN
Qty: 6.7 G | Refills: 0 | Status: SHIPPED | OUTPATIENT
Start: 2023-01-25

## 2023-01-25 NOTE — PROGRESS NOTES
Kian Navas M.D.  Internal Medicine  [facility]  [address], [address]  [place] [address]  [phone]      Chief Complaint  Cough, Sore Throat, and Headache    SUBJECTIVE    History of Present Illness    Brandy El is a 62 y.o. female with depression and recent COVID-19 who presents to the office today as an established patient of Dr Tiffanie Woodard MD here today for an acute care visit.     She is here today for cough and sore throat.  Patient was diagnosed with COVID-19 on January 3.  She saw her PCP on January 9 with continued cough, globus sensation, nasal congestion.  She was started on Flonase and Astelin.    4 days ago she started to have headache, cough and sore throat again. She denies fevers/chills. Cough is dry or productive of green sputum. No shortness of breath.     She is grieving her husbands death so she is sleeping poorly.      Review of Systems    Allergies   Allergen Reactions   • Phenergan [Promethazine Hcl] Anxiety        Outpatient Medications Marked as Taking for the 1/25/23 encounter (Office Visit) with Kian Navas MD   Medication Sig Dispense Refill   • atorvastatin (LIPITOR) 20 MG tablet TAKE ONE TABLET BY MOUTH DAILY 90 tablet 1   • azelastine (ASTELIN) 0.1 % nasal spray 2 sprays into the nostril(s) as directed by provider Daily. Use in each nostril as directed 30 mL 12   • buPROPion XL (WELLBUTRIN XL) 150 MG 24 hr tablet      • clonazePAM (KlonoPIN) 0.5 MG tablet Take 1 tablet by mouth 2 (Two) Times a Day As Needed for Anxiety. 30 tablet 2   • fluticasone (FLONASE) 50 MCG/ACT nasal spray 2 sprays into the nostril(s) as directed by provider Daily. 16 g 3   • propranolol LA (INDERAL LA) 60 MG 24 hr capsule Take 1 capsule by mouth Daily. 30 capsule 4   • valsartan (DIOVAN) 320 MG tablet Take 1 tablet by mouth Daily. 90 tablet 3        Past Medical History:   Diagnosis Date   • Anxiety    • ASCUS of cervix with negative high risk HPV 09/2001   • ASCUS of  "cervix with negative high risk HPV 09/2005   • Depression    • Essential hematuria    • Gait disturbance     R foot drop   • Hyperlipidemia    • Postmenopausal    • Vaginal delivery      Past Surgical History:   Procedure Laterality Date   • ANKLE SURGERY     • BACK SURGERY     • COLONOSCOPY  10/22/2013    tics, IH, HP   • COLONOSCOPY N/A 12/3/2018    Procedure: COLONOSCOPY TO CECUM AND INTO TERMINAL ILEUM WITH COLD BIOPSIES AND COLD BIOPSY POLYPECTOMY;  Surgeon: Syd Emerson MD;  Location: [facility] ENDOSCOPY;  Service: Gastroenterology   • ENDOSCOPY N/A 12/3/2018    Procedure: ESOPHAGOGASTRODUODENOSCOPY WITH COLD BIOPSIES;  Surgeon: Syd Emerson MD;  Location: [facility] ENDOSCOPY;  Service: Gastroenterology   • LAPAROSCOPIC TUBAL LIGATION Bilateral    • TEAR DUCT SURGERY  06/2017     Family History   Problem Relation Age of Onset   • COPD Mother    • Heart disease Father    • Heart disease Brother    • Cancer Brother    • COPD Brother    • Breast cancer Sister    • No Known Problems Daughter    • No Known Problems Son    • No Known Problems Maternal Grandmother    • No Known Problems Paternal Grandmother    • No Known Problems Maternal Aunt    • No Known Problems Paternal Aunt    • BRCA 1/2 Neg Hx    • Colon cancer Neg Hx    • Endometrial cancer Neg Hx    • Ovarian cancer Neg Hx     reports that she has never smoked. She has never used smokeless tobacco. She reports current alcohol use. She reports that she does not use drugs.    OBJECTIVE    Vital Signs:   /84   Pulse 81   Temp 98.5 °F (36.9 °C)   Ht 160 cm (62.99\")   Wt 82.1 kg (181 lb)   SpO2 97%   BMI 32.07 kg/m²     Physical Exam  Constitutional:       Appearance: Normal appearance. She is normal weight.   HENT:      Right Ear: Tympanic membrane normal.      Left Ear: Tympanic membrane normal.      Nose: Congestion present.      Mouth/Throat:      Mouth: Mucous membranes are moist.      Pharynx: Posterior oropharyngeal erythema present. "   Cardiovascular:      Rate and Rhythm: Normal rate and regular rhythm.      Heart sounds: Normal heart sounds. No murmur heard.  Pulmonary:      Effort: Pulmonary effort is normal.      Breath sounds: Normal breath sounds.   Skin:     General: Skin is warm and dry.   Neurological:      Mental Status: She is alert.   Psychiatric:         Mood and Affect: Mood normal.         Behavior: Behavior normal.         Thought Content: Thought content normal.            The following data was reviewed by: Kian Navas MD on 01/25/2023:  CMP    CMP 9/9/22 1/9/23   Glucose 99 100 (A)   BUN 19 21   Creatinine 0.83 0.77   Sodium 142 142   Potassium 4.2 4.5   Chloride 105 104   Calcium 9.5 9.8   Total Protein 6.7 7.1   Albumin 4.50 4.8   Globulin 2.2 2.3   Total Bilirubin 0.5 0.5   Alkaline Phosphatase 57 72   AST (SGOT) 13 18   ALT (SGPT) 15 26   BUN/Creatinine Ratio 22.9 27.3 (A)   (A) Abnormal value            CBC w/diff    CBC w/Diff 9/9/22 1/9/23   WBC 4.29 6.29   RBC 4.46 4.50   Hemoglobin 13.2 13.5   Hematocrit 39.0 40.6   MCV 87.4 90.2   MCH 29.6 30.0   MCHC 33.8 33.3   RDW 12.7 12.3   Platelets 204 250   Neutrophil Rel % 62.3 71.8   Lymphocyte Rel % 25.2 17.5 (A)   Monocyte Rel % 8.6 7.2   Eosinophil Rel % 3.0 2.1   Basophil Rel % 0.7 0.6   (A) Abnormal value            Lipid Panel    Lipid Panel 9/9/22 1/9/23   Total Cholesterol 179 206 (A)   Triglycerides 132 178 (A)   HDL Cholesterol 50 57   VLDL Cholesterol 23 31   LDL Cholesterol  106 (A) 118 (A)   LDL/HDL Ratio  1.99   (A) Abnormal value       Comments are available for some flowsheets but are not being displayed.           TSH    TSH 9/9/22 1/9/23   TSH 1.580 1.710               Data reviewed: Most recent PCP notes             ASSESSMENT & PLAN     Diagnoses and all orders for this visit:    1. Post-viral cough syndrome (Primary)  -     benzonatate (Tessalon Perles) 100 MG capsule; Take 1 capsule by mouth 3 (Three) Times a Day As Needed for Cough.  Dispense: 30  capsule; Refill: 0  -     albuterol sulfate  (90 Base) MCG/ACT inhaler; Inhale 1 puff Every 4 (Four) Hours As Needed for Wheezing (cough).  Dispense: 6.7 g; Refill: 0    2. Cough, unspecified type  -     POCT SARS-CoV-2 Antigen EFFIE + Flu    3. Sore throat  -     POCT rapid strep A      62 year old with recent COVID infection here for recurrent cough and sore throat for 4 days. Suspect post nasal drip or post viral cough. Recommended to try mucinex and nasal rinses for sinus congestion. Encouraged patient to reach out for new or worsening symptoms or failure of symptoms to improve    Health Maintenance Due   Topic Date Due   • ZOSTER VACCINE (1 of 2) Never done   • COVID-19 Vaccine (3 - Booster for Pfizer series) 05/27/2021   • DXA SCAN  08/21/2021   • PAP SMEAR  08/19/2022        Follow Up  No follow-ups on file.    Patient/family had no further questions at this time and verbalized understanding of the plan discussed today.

## 2023-04-05 ENCOUNTER — HOSPITAL ENCOUNTER (EMERGENCY)
Facility: HOSPITAL | Age: 63
Discharge: HOME | End: 2023-04-05
Attending: EMERGENCY MEDICINE | Admitting: EMERGENCY MEDICINE
Payer: COMMERCIAL

## 2023-04-05 ENCOUNTER — APPOINTMENT (OUTPATIENT)
Dept: GENERAL RADIOLOGY | Facility: HOSPITAL | Age: 63
End: 2023-04-05
Payer: COMMERCIAL

## 2023-04-05 VITALS
BODY MASS INDEX: 30.12 KG/M2 | HEART RATE: 64 BPM | WEIGHT: 170 LBS | DIASTOLIC BLOOD PRESSURE: 46 MMHG | RESPIRATION RATE: 16 BRPM | OXYGEN SATURATION: 94 % | HEIGHT: 63 IN | SYSTOLIC BLOOD PRESSURE: 135 MMHG | TEMPERATURE: 99 F

## 2023-04-05 DIAGNOSIS — L03.115 CELLULITIS OF RIGHT LOWER EXTREMITY: Primary | ICD-10-CM

## 2023-04-05 DIAGNOSIS — R74.8 ELEVATED LIVER ENZYMES: ICD-10-CM

## 2023-04-05 DIAGNOSIS — R31.9 HEMATURIA, UNSPECIFIED TYPE: ICD-10-CM

## 2023-04-05 DIAGNOSIS — R50.9 FEVER OF UNKNOWN ORIGIN: ICD-10-CM

## 2023-04-05 LAB
ALBUMIN SERPL-MCNC: 4 G/DL (ref 3.5–5.2)
ALBUMIN/GLOB SERPL: 1.6 G/DL
ALP SERPL-CCNC: 53 U/L (ref 39–117)
ALT SERPL W P-5'-P-CCNC: 69 U/L (ref 1–33)
ANION GAP SERPL CALCULATED.3IONS-SCNC: 7.6 MMOL/L (ref 5–15)
AST SERPL-CCNC: 60 U/L (ref 1–32)
BACTERIA UR QL AUTO: ABNORMAL /HPF
BASOPHILS # BLD AUTO: 0.02 10*3/MM3 (ref 0–0.2)
BASOPHILS NFR BLD AUTO: 0.3 % (ref 0–1.5)
BILIRUB SERPL-MCNC: 0.7 MG/DL (ref 0–1.2)
BILIRUB UR QL STRIP: NEGATIVE
BUN SERPL-MCNC: 11 MG/DL (ref 8–23)
BUN/CREAT SERPL: 14.3 (ref 7–25)
CALCIUM SPEC-SCNC: 8.7 MG/DL (ref 8.6–10.5)
CHLORIDE SERPL-SCNC: 105 MMOL/L (ref 98–107)
CLARITY UR: ABNORMAL
CO2 SERPL-SCNC: 22.4 MMOL/L (ref 22–29)
COLOR UR: YELLOW
CREAT SERPL-MCNC: 0.77 MG/DL (ref 0.57–1)
DEPRECATED RDW RBC AUTO: 42.3 FL (ref 37–54)
EGFRCR SERPLBLD CKD-EPI 2021: 88.4 ML/MIN/1.73
EOSINOPHIL # BLD AUTO: 0.01 10*3/MM3 (ref 0–0.4)
EOSINOPHIL NFR BLD AUTO: 0.1 % (ref 0.3–6.2)
ERYTHROCYTE [DISTWIDTH] IN BLOOD BY AUTOMATED COUNT: 12.9 % (ref 12.3–15.4)
GLOBULIN UR ELPH-MCNC: 2.5 GM/DL
GLUCOSE SERPL-MCNC: 124 MG/DL (ref 65–99)
GLUCOSE UR STRIP-MCNC: NEGATIVE MG/DL
HCT VFR BLD AUTO: 36.2 % (ref 34–46.6)
HGB BLD-MCNC: 12.2 G/DL (ref 12–15.9)
HGB UR QL STRIP.AUTO: ABNORMAL
HYALINE CASTS UR QL AUTO: ABNORMAL /LPF
IMM GRANULOCYTES # BLD AUTO: 0.05 10*3/MM3 (ref 0–0.05)
IMM GRANULOCYTES NFR BLD AUTO: 0.6 % (ref 0–0.5)
KETONES UR QL STRIP: NEGATIVE
LEUKOCYTE ESTERASE UR QL STRIP.AUTO: ABNORMAL
LYMPHOCYTES # BLD AUTO: 0.52 10*3/MM3 (ref 0.7–3.1)
LYMPHOCYTES NFR BLD AUTO: 6.6 % (ref 19.6–45.3)
MCH RBC QN AUTO: 30.1 PG (ref 26.6–33)
MCHC RBC AUTO-ENTMCNC: 33.7 G/DL (ref 31.5–35.7)
MCV RBC AUTO: 89.4 FL (ref 79–97)
MONOCYTES # BLD AUTO: 0.36 10*3/MM3 (ref 0.1–0.9)
MONOCYTES NFR BLD AUTO: 4.6 % (ref 5–12)
NEUTROPHILS NFR BLD AUTO: 6.86 10*3/MM3 (ref 1.7–7)
NEUTROPHILS NFR BLD AUTO: 87.8 % (ref 42.7–76)
NITRITE UR QL STRIP: NEGATIVE
NRBC BLD AUTO-RTO: 0 /100 WBC (ref 0–0.2)
PH UR STRIP.AUTO: 5.5 [PH] (ref 5–8)
PLATELET # BLD AUTO: 147 10*3/MM3 (ref 140–450)
PMV BLD AUTO: 9.7 FL (ref 6–12)
POTASSIUM SERPL-SCNC: 3.7 MMOL/L (ref 3.5–5.2)
PROT SERPL-MCNC: 6.5 G/DL (ref 6–8.5)
PROT UR QL STRIP: ABNORMAL
RBC # BLD AUTO: 4.05 10*6/MM3 (ref 3.77–5.28)
RBC # UR STRIP: ABNORMAL /HPF
REF LAB TEST METHOD: ABNORMAL
SODIUM SERPL-SCNC: 135 MMOL/L (ref 136–145)
SP GR UR STRIP: 1.03 (ref 1–1.03)
SQUAMOUS #/AREA URNS HPF: ABNORMAL /HPF
UROBILINOGEN UR QL STRIP: ABNORMAL
WBC # UR STRIP: ABNORMAL /HPF
WBC NRBC COR # BLD: 7.82 10*3/MM3 (ref 3.4–10.8)

## 2023-04-05 PROCEDURE — 96365 THER/PROPH/DIAG IV INF INIT: CPT

## 2023-04-05 PROCEDURE — 85025 COMPLETE CBC W/AUTO DIFF WBC: CPT | Performed by: NURSE PRACTITIONER

## 2023-04-05 PROCEDURE — 99283 EMERGENCY DEPT VISIT LOW MDM: CPT

## 2023-04-05 PROCEDURE — 25010000002 CEFTRIAXONE PER 250 MG: Performed by: NURSE PRACTITIONER

## 2023-04-05 PROCEDURE — 81001 URINALYSIS AUTO W/SCOPE: CPT | Performed by: NURSE PRACTITIONER

## 2023-04-05 PROCEDURE — 80053 COMPREHEN METABOLIC PANEL: CPT | Performed by: NURSE PRACTITIONER

## 2023-04-05 PROCEDURE — 71045 X-RAY EXAM CHEST 1 VIEW: CPT

## 2023-04-05 RX ORDER — CEPHALEXIN 500 MG/1
500 CAPSULE ORAL 4 TIMES DAILY
Qty: 28 CAPSULE | Refills: 0 | Status: SHIPPED | OUTPATIENT
Start: 2023-04-05 | End: 2023-04-11

## 2023-04-05 RX ADMIN — CEFTRIAXONE SODIUM 1 G: 1 INJECTION, POWDER, FOR SOLUTION INTRAMUSCULAR; INTRAVENOUS at 08:47

## 2023-04-05 NOTE — DISCHARGE INSTRUCTIONS
After 24-28 hours on Keflex the redness on your leg should not continue to increase  Return to ER for increased pain/redness after starting antibiotics, high fever that won't go away with medicine, any new concerns

## 2023-04-05 NOTE — ED TRIAGE NOTES
"Patient self presents ambulatory to ED reporting fevers since yesterday, TMAX 102.7F, and a rash to her right lower leg that she noticed this morning. Patient reports doing yard work Monday afternoon, unsure if this is related to her right lower leg rash. Patient last took ibuprofen around 0300 and tylenol around 0430. Patient found to have indurated, swollen, erythemic and warm area to right lower leg, reports intermittent \"shooting\" pains to her foot that she experiences due to a history of drop foot. Patient also reports burning with urination that began yesterday.  "

## 2023-04-05 NOTE — ED PROVIDER NOTES
EMERGENCY DEPARTMENT ENCOUNTER    Room Number:  08/08  Date seen:  4/5/2023  PCP: Tiffanie Woodard MD  Historian/Independent historian: self  Chronic or social conditions impacting care: n/a      HPI:  Chief Complaint: fever  A complete HPI/ROS/PMH/PSH/SH/FH are unobtainable due to: n/a  Context: Brandy El is a 62 y.o. female who presents to the ED c/o fever of 102.7F intermittently since Tuesday.  She states that then last night she noticed that the bottom of her right leg was red and irritated.  She states it was itching a little bit when she was in the bath.  She denies any cough, sore throat, chest pain, shortness of breath, headache, abdominal pain, nausea, vomiting, diarrhea.  She states that she has had some dysuria.  No hematuria.  No known injury to her right lower extremity.    External Medical record review:   1/25/2023: Post viral cough syndrome prescribed Tessalon Perles  9/9/2022: Routine UA shows consistent hematuria    PAST MEDICAL HISTORY  Active Ambulatory Problems     Diagnosis Date Noted   • Health care maintenance 06/14/2016   • Hyperlipidemia 06/14/2016   • History of MRSA infection 06/27/2017   • Dyspepsia 10/17/2018   • Change in bowel habits 10/17/2018   • FH: esophageal cancer 10/17/2018   • Anxiety 09/16/2022     Resolved Ambulatory Problems     Diagnosis Date Noted   • No Resolved Ambulatory Problems     Past Medical History:   Diagnosis Date   • ASCUS of cervix with negative high risk HPV 09/2001   • ASCUS of cervix with negative high risk HPV 09/2005   • Depression    • Essential hematuria    • Gait disturbance    • Postmenopausal    • Vaginal delivery          PAST SURGICAL HISTORY  Past Surgical History:   Procedure Laterality Date   • ANKLE SURGERY     • BACK SURGERY     • COLONOSCOPY  10/22/2013    tics, IH, HP   • COLONOSCOPY N/A 12/3/2018    Procedure: COLONOSCOPY TO CECUM AND INTO TERMINAL ILEUM WITH COLD BIOPSIES AND COLD BIOPSY POLYPECTOMY;  Surgeon: Syd Emerson MD;   Location: [facility];  Service: Gastroenterology   • ENDOSCOPY N/A 12/3/2018    Procedure: ESOPHAGOGASTRODUODENOSCOPY WITH COLD BIOPSIES;  Surgeon: Syd Emerson MD;  Location: [facility];  Service: Gastroenterology   • LAPAROSCOPIC TUBAL LIGATION Bilateral    • TEAR DUCT SURGERY  06/2017         FAMILY HISTORY  Family History   Problem Relation Age of Onset   • COPD Mother    • Heart disease Father    • Heart disease Brother    • Cancer Brother    • COPD Brother    • Breast cancer Sister    • No Known Problems Daughter    • No Known Problems Son    • No Known Problems Maternal Grandmother    • No Known Problems Paternal Grandmother    • No Known Problems Maternal Aunt    • No Known Problems Paternal Aunt    • BRCA 1/2 Neg Hx    • Colon cancer Neg Hx    • Endometrial cancer Neg Hx    • Ovarian cancer Neg Hx          SOCIAL HISTORY  Social History     Socioeconomic History   • Marital status:    Tobacco Use   • Smoking status: Never   • Smokeless tobacco: Never   Substance and Sexual Activity   • Alcohol use: Yes     Comment: 2 x weekly   • Drug use: No   • Sexual activity: Yes     Birth control/protection: Post-menopausal         ALLERGIES  Phenergan [promethazine hcl]        REVIEW OF SYSTEMS  Per HPI, otherwise negative.       PHYSICAL EXAM  ED Triage Vitals   Temp Heart Rate Resp BP SpO2   04/05/23 0545 04/05/23 0545 04/05/23 0545 04/05/23 0555 04/05/23 0545   99 °F (37.2 °C) 82 16 148/78 94 %      Temp src Heart Rate Source Patient Position BP Location FiO2 (%)   04/05/23 0545 04/05/23 0545 04/05/23 0555 04/05/23 0555 --   Tympanic Monitor Sitting Right arm        Physical Exam  Vitals and nursing note reviewed.   Constitutional:       Appearance: Normal appearance.   HENT:      Head: Normocephalic.      Right Ear: Tympanic membrane normal.      Left Ear: Tympanic membrane normal.      Nose: Nose normal.      Mouth/Throat:      Mouth: Mucous membranes are moist.   Eyes:       Conjunctiva/sclera: Conjunctivae normal.   Cardiovascular:      Rate and Rhythm: Normal rate and regular rhythm.      Pulses: Normal pulses.      Heart sounds: Normal heart sounds.   Pulmonary:      Effort: Pulmonary effort is normal.      Breath sounds: Normal breath sounds.   Abdominal:      General: Bowel sounds are normal.      Tenderness: There is no abdominal tenderness. There is no guarding.   Musculoskeletal:         General: Normal range of motion.   Skin:     General: Skin is warm.      Capillary Refill: Capillary refill takes less than 2 seconds.      Comments: Right, anterior shin is erythematous and warm to touch.  There is no calf swelling, palpable cords.  Distal pulses palpable.  Sensation intact light touch.  No bony tenderness.   Neurological:      Mental Status: She is alert and oriented to person, place, and time. Mental status is at baseline.   Psychiatric:         Mood and Affect: Mood normal.               LAB RESULTS  Labs Reviewed   COMPREHENSIVE METABOLIC PANEL - Abnormal; Notable for the following components:       Result Value    Glucose 124 (*)     Sodium 135 (*)     ALT (SGPT) 69 (*)     AST (SGOT) 60 (*)     All other components within normal limits    Narrative:     GFR Normal >60  Chronic Kidney Disease <60  Kidney Failure <15     URINALYSIS W/ CULTURE IF INDICATED - Abnormal; Notable for the following components:    Appearance, UA Cloudy (*)     Blood, UA Moderate (2+) (*)     Protein, UA 30 mg/dL (1+) (*)     Leuk Esterase, UA Trace (*)     All other components within normal limits    Narrative:     In absence of clinical symptoms, the presence of pyuria, bacteria, and/or nitrites on the urinalysis result does not correlate with infection.   CBC WITH AUTO DIFFERENTIAL - Abnormal; Notable for the following components:    Neutrophil % 87.8 (*)     Lymphocyte % 6.6 (*)     Monocyte % 4.6 (*)     Eosinophil % 0.1 (*)     Immature Grans % 0.6 (*)     Lymphocytes, Absolute 0.52 (*)      All other components within normal limits   URINALYSIS, MICROSCOPIC ONLY - Abnormal; Notable for the following components:    RBC, UA 21-30 (*)     WBC, UA 3-5 (*)     All other components within normal limits   CBC AND DIFFERENTIAL    Narrative:     The following orders were created for panel order CBC & Differential.  Procedure                               Abnormality         Status                     ---------                               -----------         ------                     CBC Auto Differential[[id number]]        Abnormal            Final result                 Please view results for these tests on the individual orders.       Ordered the above labs and reviewed the results.        RADIOLOGY  Study Result    Narrative & Impression   XR CHEST 1 VW-clinical: Fever     COMPARISON: None     FINDINGS: Cardiomediastinal silhouette is satisfactory in appearance. No  effusion, edema or acute airspace disease is demonstrated.     CONCLUSION: No active disease of the chest     This report was finalized on 4/5/2023 6:53 AM by Dr. David Ovalle M.D.          Ordered the above noted radiological studies. Reviewed by me in PACS.        MEDICATIONS GIVEN IN ER  Medications   cefTRIAXone (ROCEPHIN) 1 g in sodium chloride 0.9 % 100 mL IVPB-VTB (0 g Intravenous Stopped 4/5/23 0924)           MEDICAL DECISION MAKING, PROGRESS, and CONSULTS    All labs have been independently reviewed by me.  All radiology studies have been reviewed by me and I have also reviewed the radiology report.   EKG's independently viewed and interpreted by me.  Discussion below represents my analysis of pertinent findings related to patient's condition, differential diagnosis, treatment plan and final disposition.    62-year-old female who presents with fever and erythema to the right anterior shin  Labs, imaging unremarkable  1 dose of IV antibiotics given in ER prior to discharge  Patient was offered admission and declined.  She will try  oral antibiotics with strict return precautions          Shared decision making/consideration for admission:  Offered admission      Orders placed during this visit:  Orders Placed This Encounter   Procedures   • XR Chest 1 View   • Comprehensive Metabolic Panel   • Urinalysis With Culture If Indicated - Urine, Clean Catch   • CBC Auto Differential   • Urinalysis, Microscopic Only - Urine, Clean Catch   • CBC & Differential         Differential diagnosis include, but not limited to: Cellulitis, folliculitis, pneumonia, UTI      Additional orders considered but not ordered: n/a    Independent interpretation of labs, radiology studies, and discussions with consultants:    Discussed with Dr. Ludwig, who agrees with plan.     ED Course as of 04/06/23 0957   Wed Apr 05, 2023   0650 Per my independent interpretation of the chest x-ray, there is no obvious pneumothorax.   [JG]   0703 WBC: 7.82 [JG]   0703 Hemoglobin: 12.2 [JG]   0703 Neutrophil Rel %(!): 87.8 [JG]   0710 Leukocytes, UA(!): Trace [JG]   0710 Blood, UA(!): Moderate (2+) [JG]   0710 RBC, UA(!): 21-30 [JG]   0710 WBC, UA(!): 3-5 [JG]   0710 Bacteria, UA: None Seen [JG]   0817 ALT (SGPT)(!): 69 [JG]   0817 AST (SGOT)(!): 60 [JG]   0818 Platelets: 147 [JG]   0901 Updated patient on ED workup, including labs and imaging (if performed). We discussed follow up instructions and return precautions. The patient verbalizes understanding and is ready for discharge.  Strict return precautions discussed about increased redness or worsening symptoms.  [JG]      ED Course User Index  [JG] Cecile Joe APRN             DIAGNOSIS  Final diagnoses:   Cellulitis of right lower extremity   Fever of unknown origin   Hematuria, unspecified type   Elevated liver enzymes         DISPOSITION  discharge      Latest Documented Vital Signs:  As of 06:20 EDT  BP- 148/78 HR- 72 Temp- 99 °F (37.2 °C) (Tympanic) O2 sat- 94%              --    Please note that portions of this were  completed with a voice recognition program.       Note Disclaimer: At [facility], we believe that sharing information builds trust and better relationships. You are receiving this note because you are receiving care at [facility] or recently visited. It is possible you will see health information before a provider has talked with you about it. This kind of information can be easy to misunderstand. To help you fully understand what it means for your health, we urge you to discuss this note with your provider.           Cecile Joe, EDITH  04/06/23 0959

## 2023-04-05 NOTE — ED PROVIDER NOTES
MD ATTESTATION NOTE    The ALBA and I have discussed this patient's history, physical exam, and treatment plan.  I have reviewed the documentation and personally had a face to face interaction with the patient. I affirm the documentation and agree with the treatment and plan.  The attached note describes my personal findings.      I provided a substantive portion of the care of the patient.  I personally performed the physical exam in its entirety, and below are my findings.  For this patient encounter, the patient wore surgical mask, I wore full protective PPE including N95 and eye protection    Brief HPI: 62-year-old female who presents to the emergency room for several days of fever and noticed a rash to her right lower extremity last night.  The patient denies any known trauma.  She denies a history of diabetes or immunocompromise state.  She states she has not had cellulitis before, but did have an abscess on her finger before that had to be drained and was told that was MRSA.    General : 62-year-old patient is awake alert and oriented  HEENT: NCAT  Ext: No acute abnormalities  Skin: The patient has an area of approximately 8 x 4 cm of mild erythema to her anterior right shin without fluctuance or abscess.  There is no lymphangitis.  She is neurovascular intact in her right foot.  This does not affect her right knee or right ankle.  The patient does have a scar over her right ankle that she states is from a tendon replacement but does not have hardware in her leg.    Neuro: Awake with a nonfocal neuro exam  Psych: Normal mood and affect      Plan: We will obtain labs  The patient has a normal white count and is afebrile.  Her cellulitis is what has caused her fever.  We will give her dose of IV antibiotics in the emergency room.  I had a long decision-making discussion between the patient, myself and her daughter-we discussed admission for IV antibiotics versus a dose of IV antibiotic and discharged home on  oral antibiotics.  The patient would prefer to go home.  I gave her and her daughter careful return emergency room instructions.  They understand and agree with the plan.       Shar Ludwig MD  04/05/23 6529

## 2023-04-11 ENCOUNTER — OFFICE VISIT (OUTPATIENT)
Dept: INTERNAL MEDICINE | Facility: CLINIC | Age: 63
End: 2023-04-11
Payer: COMMERCIAL

## 2023-04-11 VITALS
SYSTOLIC BLOOD PRESSURE: 124 MMHG | WEIGHT: 167 LBS | DIASTOLIC BLOOD PRESSURE: 82 MMHG | HEIGHT: 63 IN | BODY MASS INDEX: 29.59 KG/M2

## 2023-04-11 DIAGNOSIS — L03.115 CELLULITIS OF RIGHT LOWER EXTREMITY: Primary | ICD-10-CM

## 2023-04-11 DIAGNOSIS — R31.9 HEMATURIA, UNSPECIFIED TYPE: ICD-10-CM

## 2023-04-11 DIAGNOSIS — F41.9 ANXIETY: ICD-10-CM

## 2023-04-11 PROCEDURE — 99214 OFFICE O/P EST MOD 30 MIN: CPT | Performed by: INTERNAL MEDICINE

## 2023-04-11 RX ORDER — CLONAZEPAM 0.5 MG/1
0.5 TABLET ORAL 2 TIMES DAILY PRN
Qty: 30 TABLET | Refills: 2 | Status: SHIPPED | OUTPATIENT
Start: 2023-04-11

## 2023-04-11 RX ORDER — TRAZODONE HYDROCHLORIDE 50 MG/1
TABLET ORAL
COMMUNITY
Start: 2023-04-06

## 2023-04-11 RX ORDER — ESCITALOPRAM OXALATE 10 MG/1
TABLET ORAL
COMMUNITY
Start: 2023-04-06

## 2023-04-11 NOTE — PROGRESS NOTES
"Chief Complaint   Patient presents with   • cellulitis   • Anxiety   • hematuria       History of Present Illness   Brandy El is a 62 y.o. female presents for ER follow up. Working in her yard a week ago. Next day developed a fever and couldn't sleep as she felt \"so miserable\". To ER. Diagnosed with cellulitis. She was given dose of iv abx and sent home on keflex. She notes that she was afebrile by Friday. She had normal wbc in ER. Today she is felling well. Notes some swelling if on the leg but discoloration has greatly reduced.   She was noted to have hematuria. She has experienced this before. Mild elevated ast and alt. She has a longstaning history of hematuria. Had ct in remote past [facility] urology. She does not recall a cystoscope.         The following portions of the patient's history were reviewed and updated as appropriate: allergies, current medications, past family history, past medical history, past social history, past surgical history and problem list.  Current Outpatient Medications on File Prior to Visit   Medication Sig Dispense Refill   • atorvastatin (LIPITOR) 20 MG tablet TAKE ONE TABLET BY MOUTH DAILY 90 tablet 1   • buPROPion XL (WELLBUTRIN XL) 150 MG 24 hr tablet 3 tablets.     • escitalopram (LEXAPRO) 10 MG tablet      • fluticasone (FLONASE) 50 MCG/ACT nasal spray 2 sprays into the nostril(s) as directed by provider Daily. 16 g 3   • propranolol LA (INDERAL LA) 60 MG 24 hr capsule Take 1 capsule by mouth Daily. 30 capsule 4   • traZODone (DESYREL) 50 MG tablet      • valsartan (DIOVAN) 320 MG tablet Take 1 tablet by mouth Daily. 90 tablet 3   • [DISCONTINUED] clonazePAM (KlonoPIN) 0.5 MG tablet Take 1 tablet by mouth 2 (Two) Times a Day As Needed for Anxiety. 30 tablet 2   • [DISCONTINUED] albuterol sulfate  (90 Base) MCG/ACT inhaler Inhale 1 puff Every 4 (Four) Hours As Needed for Wheezing (cough). 6.7 g 0   • [DISCONTINUED] azelastine (ASTELIN) 0.1 % nasal spray 2 sprays into " the nostril(s) as directed by provider Daily. Use in each nostril as directed (Patient not taking: Reported on 4/11/2023) 30 mL 12   • [DISCONTINUED] benzonatate (Tessalon Perles) 100 MG capsule Take 1 capsule by mouth 3 (Three) Times a Day As Needed for Cough. (Patient not taking: Reported on 4/11/2023) 30 capsule 0   • [DISCONTINUED] cephalexin (KEFLEX) 500 MG capsule Take 1 capsule by mouth 4 (Four) Times a Day for 7 days. (Patient not taking: Reported on 4/11/2023) 28 capsule 0     No current facility-administered medications on file prior to visit.     Review of Systems   Constitutional: Negative.    HENT: Negative.    Eyes: Negative.    Respiratory: Negative.    Cardiovascular: Negative.    Gastrointestinal: Negative.    Endocrine: Negative.    Genitourinary: Negative.    Musculoskeletal: Negative.    Skin: Positive for color change.   Allergic/Immunologic: Negative.    Neurological: Negative.    Hematological: Negative.    Psychiatric/Behavioral: Negative.        Objective   Physical Exam  Vitals and nursing note reviewed.   Constitutional:       Appearance: Normal appearance.   HENT:      Head: Normocephalic and atraumatic.      Right Ear: Tympanic membrane normal.      Left Ear: Tympanic membrane normal.      Nose: Nose normal.      Mouth/Throat:      Mouth: Mucous membranes are moist.   Eyes:      Extraocular Movements: Extraocular movements intact.      Pupils: Pupils are equal, round, and reactive to light.   Cardiovascular:      Rate and Rhythm: Normal rate and regular rhythm.      Pulses: Normal pulses.      Heart sounds: Normal heart sounds.   Pulmonary:      Effort: Pulmonary effort is normal.      Breath sounds: Normal breath sounds.   Abdominal:      General: Abdomen is flat.      Palpations: Abdomen is soft.   Musculoskeletal:         General: Normal range of motion.      Cervical back: Normal range of motion and neck supple.   Skin:     Comments: Post infectious hyperpigmentation   Neurological:  "     General: No focal deficit present.      Mental Status: She is alert and oriented to person, place, and time.   Psychiatric:         Mood and Affect: Mood normal.         Behavior: Behavior normal.         Thought Content: Thought content normal.         Judgment: Judgment normal.          /82   Ht 160 cm (63\")   Wt 75.8 kg (167 lb)   BMI 29.58 kg/m²     Assessment & Plan   Diagnoses and all orders for this visit:    Cellulitis of right lower extremity    Hematuria, unspecified type  -     Urinalysis With Culture If Indicated -    Anxiety  -     clonazePAM (KlonoPIN) 0.5 MG tablet; Take 1 tablet by mouth 2 (Two) Times a Day As Needed for Anxiety.    Other orders  -     escitalopram (LEXAPRO) 10 MG tablet  -     traZODone (DESYREL) 50 MG tablet        Patient with acute new cellulitis. She has improved with keflex. Will complete full course. She will keep clean w/ dial soap. Elevate when seated.   Incidental finding hematuria. Will repeat urine today. May f/u w/ urology if indicated. Elevated ast/ alt. .likely related to muscular inflammation. Will repeat in 2 weeks. She has anxiety. Will refill clonazepam today. Consider reducing in future. She is aware of risks and benefits of med. She will continue lexapro for mood. She will follow up w/ therapist and psychiatry routinely.          Answers for HPI/ROS submitted by the patient on 4/10/2023  Please describe your symptoms.: Follow up visit from ER for cellulitus  Have you had these symptoms before?: No  How long have you been having these symptoms?: 5-7 days  Please list any medications you are currently taking for this condition.: Cephalexin 500mg. 4x daily  What is the primary reason for your visit?: Other      "

## 2023-04-12 LAB
APPEARANCE UR: CLEAR
BACTERIA #/AREA URNS HPF: NORMAL /[HPF]
BILIRUB UR QL STRIP: NEGATIVE
CASTS URNS QL MICRO: NORMAL /LPF
COLOR UR: YELLOW
EPI CELLS #/AREA URNS HPF: NORMAL /HPF (ref 0–10)
GLUCOSE UR QL STRIP: NEGATIVE
HGB UR QL STRIP: NEGATIVE
KETONES UR QL STRIP: NEGATIVE
LEUKOCYTE ESTERASE UR QL STRIP: NEGATIVE
MICRO URNS: NORMAL
MICRO URNS: NORMAL
NITRITE UR QL STRIP: NEGATIVE
PH UR STRIP: 6 [PH] (ref 5–7.5)
PROT UR QL STRIP: NEGATIVE
RBC #/AREA URNS HPF: NORMAL /HPF (ref 0–2)
SP GR UR STRIP: 1.02 (ref 1–1.03)
URINALYSIS REFLEX: NORMAL
UROBILINOGEN UR STRIP-MCNC: 0.2 MG/DL (ref 0.2–1)
WBC #/AREA URNS HPF: NORMAL /HPF (ref 0–5)

## 2023-05-02 ENCOUNTER — OFFICE VISIT (OUTPATIENT)
Dept: INTERNAL MEDICINE | Facility: CLINIC | Age: 63
End: 2023-05-02
Payer: COMMERCIAL

## 2023-05-02 VITALS
DIASTOLIC BLOOD PRESSURE: 70 MMHG | HEART RATE: 55 BPM | OXYGEN SATURATION: 97 % | TEMPERATURE: 96.8 F | WEIGHT: 179 LBS | BODY MASS INDEX: 31.71 KG/M2 | RESPIRATION RATE: 16 BRPM | HEIGHT: 63 IN | SYSTOLIC BLOOD PRESSURE: 134 MMHG

## 2023-05-02 DIAGNOSIS — F32.A DEPRESSION, UNSPECIFIED DEPRESSION TYPE: ICD-10-CM

## 2023-05-02 DIAGNOSIS — I10 HYPERTENSION, UNSPECIFIED TYPE: ICD-10-CM

## 2023-05-02 DIAGNOSIS — F41.9 ANXIETY: Primary | ICD-10-CM

## 2023-05-02 PROCEDURE — 99213 OFFICE O/P EST LOW 20 MIN: CPT | Performed by: INTERNAL MEDICINE

## 2023-05-02 NOTE — PROGRESS NOTES
"Chief Complaint   Patient presents with   • Hypertension   • Anxiety   • Depression       History of Present Illness   Brandy El is a 62 y.o. female presents for follow up evaluation on anxiety and depression. She lost her spouse one year ago. Patient notes that she has improved w/ at least 5 hours straight of sleep then can fall back to sleep. She notes that life is now more \"day to day than hour to hour\". She is now tackling some home projects. Patient reports that appetite has been eating as much. She is getting routine movement. She is following w/ psychiatry routinely.   Recent ER visit for cellulitis. This has resolved.   Patient has htn. bp running 120-130/70.             The following portions of the patient's history were reviewed and updated as appropriate: allergies, current medications, past family history, past medical history, past social history, past surgical history and problem list.  Current Outpatient Medications on File Prior to Visit   Medication Sig Dispense Refill   • atorvastatin (LIPITOR) 20 MG tablet TAKE ONE TABLET BY MOUTH DAILY 90 tablet 1   • buPROPion XL (WELLBUTRIN XL) 150 MG 24 hr tablet Take 3 tablets by mouth Every Morning.     • clonazePAM (KlonoPIN) 0.5 MG tablet Take 1 tablet by mouth 2 (Two) Times a Day As Needed for Anxiety. 30 tablet 2   • escitalopram (LEXAPRO) 10 MG tablet      • fluticasone (FLONASE) 50 MCG/ACT nasal spray 2 sprays into the nostril(s) as directed by provider Daily. 16 g 3   • propranolol LA (INDERAL LA) 60 MG 24 hr capsule Take 1 capsule by mouth Daily. 30 capsule 4   • traZODone (DESYREL) 50 MG tablet      • valsartan (DIOVAN) 320 MG tablet Take 1 tablet by mouth Daily. 90 tablet 3     No current facility-administered medications on file prior to visit.     Review of Systems   Constitutional: Negative.    HENT: Negative.    Eyes: Negative.    Respiratory: Negative.    Cardiovascular: Negative.    Gastrointestinal: Negative.    Endocrine: Negative.  " "  Genitourinary: Positive for frequency.   Musculoskeletal: Negative.    Allergic/Immunologic: Negative.    Neurological: Negative.    Hematological: Negative.    Psychiatric/Behavioral: Negative.        Objective   Physical Exam  Vitals and nursing note reviewed. Exam conducted with a chaperone present.   Constitutional:       Appearance: She is well-developed.   HENT:      Head: Normocephalic and atraumatic.      Right Ear: External ear normal.      Left Ear: External ear normal.      Nose: Nose normal.   Eyes:      Pupils: Pupils are equal, round, and reactive to light.   Cardiovascular:      Rate and Rhythm: Normal rate and regular rhythm.      Heart sounds: Normal heart sounds.   Pulmonary:      Effort: Pulmonary effort is normal. No respiratory distress.      Breath sounds: Normal breath sounds.   Abdominal:      Palpations: Abdomen is soft.   Musculoskeletal:         General: Normal range of motion.      Cervical back: Neck supple.   Skin:     General: Skin is warm and dry.   Neurological:      Mental Status: She is alert and oriented to person, place, and time.   Psychiatric:         Mood and Affect: Mood normal.         Behavior: Behavior normal.         Thought Content: Thought content normal.         Judgment: Judgment normal.          /70   Pulse 55   Temp 96.8 °F (36 °C) (Temporal)   Resp 16   Ht 160 cm (63\")   Wt 81.2 kg (179 lb)   SpO2 97%   BMI 31.71 kg/m²     Assessment & Plan   Diagnoses and all orders for this visit:    Anxiety    Depression, unspecified depression type    Hypertension, unspecified type      Patient w/ anxiety and depression. She will continue current medications and follow with her psychology and psychiatry routinely. Will maintain healthy nutrition w/ routine activity. She will monitor bp regularly and call w/ readings. . Low sodium nutrtion.            Answers for HPI/ROS submitted by the patient on 4/28/2023  Please describe your symptoms.: Follow up  Have you had " these symptoms before?: No  How long have you been having these symptoms?: 1-4 days  What is the primary reason for your visit?: Other

## 2023-05-28 ENCOUNTER — APPOINTMENT (OUTPATIENT)
Dept: GENERAL RADIOLOGY | Facility: HOSPITAL | Age: 63
End: 2023-05-28

## 2023-05-28 ENCOUNTER — HOSPITAL ENCOUNTER (EMERGENCY)
Facility: HOSPITAL | Age: 63
Discharge: HOME | End: 2023-05-28
Attending: EMERGENCY MEDICINE | Admitting: EMERGENCY MEDICINE

## 2023-05-28 VITALS
DIASTOLIC BLOOD PRESSURE: 73 MMHG | RESPIRATION RATE: 16 BRPM | BODY MASS INDEX: 29.23 KG/M2 | WEIGHT: 165 LBS | SYSTOLIC BLOOD PRESSURE: 143 MMHG | OXYGEN SATURATION: 91 % | TEMPERATURE: 99.1 F | HEIGHT: 63 IN | HEART RATE: 75 BPM

## 2023-05-28 DIAGNOSIS — M25.532 ACUTE PAIN OF LEFT WRIST: Primary | ICD-10-CM

## 2023-05-28 PROCEDURE — 73110 X-RAY EXAM OF WRIST: CPT

## 2023-05-28 PROCEDURE — 99283 EMERGENCY DEPT VISIT LOW MDM: CPT

## 2023-05-28 RX ORDER — HYDROCODONE BITARTRATE AND ACETAMINOPHEN 5; 325 MG/1; MG/1
1 TABLET ORAL ONCE
Status: COMPLETED | OUTPATIENT
Start: 2023-05-28 | End: 2023-05-28

## 2023-05-28 RX ORDER — HYDROCODONE BITARTRATE AND ACETAMINOPHEN 5; 325 MG/1; MG/1
TABLET ORAL
Qty: 8 TABLET | Refills: 0 | Status: SHIPPED | OUTPATIENT
Start: 2023-05-28

## 2023-05-28 RX ADMIN — HYDROCODONE BITARTRATE AND ACETAMINOPHEN 1 TABLET: 5; 325 TABLET ORAL at 13:30

## 2023-05-28 NOTE — ED NOTES
"Injured left hand/wrist a couple weeks ago but thought she twisted it and it would get better.  She reinjured it last night and the pain is \"excruciating\"    "

## 2023-05-28 NOTE — ED PROVIDER NOTES
EMERGENCY DEPARTMENT ENCOUNTER    Room Number:  07/07  Date of encounter:  5/28/2023  PCP: Tiffanie Woodard MD  Patient Care Team:  Tiffanie Woodard MD as PCP - General  Tiffanie Woodard MD as PCP - Family Medicine   Independent Historians: Patient    HPI:  Chief Complaint: Hand injury  A complete HPI/ROS/PMH/PSH/SH/FH are unobtainable due to: None    Chronic or social conditions impacting patient care (social determinants of health): None    Context: Brandy El is a 62 y.o. female who presents to the ED c/o left wrist pain.  Pain radiates into her left hand.  She states she had been hit in the left wrist couple weeks ago but states her pain had been improving until 2 days ago when she began having increased discomfort.  She states she had been watching grandchildren and is unsure if she injured it then.  No falls or known trauma.  No fevers, chills, color change to the area.  No previous surgeries to the area.  She has been taking Tylenol and ibuprofen at home with mild relief.    Review of prior external notes (non-ED): Office visit with primary care Dr. Woodard on 5/2/2023.  Patient seen in follow-up for anxiety and depression.  Seems to be improving and is following with psychiatry routinely.  No medication changes made.    Review of prior external test results outside of this encounter: Labs from 4/5/2023 reviewed.  Normal renal function.  Mildly elevated ALT and AST of 69 and 60.  CBC was unremarkable.    PAST MEDICAL HISTORY  Active Ambulatory Problems     Diagnosis Date Noted   • Health care maintenance 06/14/2016   • Hyperlipidemia 06/14/2016   • History of MRSA infection 06/27/2017   • Dyspepsia 10/17/2018   • Change in bowel habits 10/17/2018   • FH: esophageal cancer 10/17/2018   • Anxiety 09/16/2022     Resolved Ambulatory Problems     Diagnosis Date Noted   • No Resolved Ambulatory Problems     Past Medical History:   Diagnosis Date   • ASCUS of cervix with negative high risk HPV 09/2001   • ASCUS of  cervix with negative high risk HPV 09/2005   • Colon polyp 2013   • Depression    • Essential hematuria    • Gait disturbance    • Hypertension 2020   • Irritable bowel syndrome many years ago   • Postmenopausal    • Vaginal delivery        The patient has started, but not completed, their COVID-19 vaccination series.    PAST SURGICAL HISTORY  Past Surgical History:   Procedure Laterality Date   • ANKLE SURGERY     • BACK SURGERY     • COLONOSCOPY  10/22/2013    tics, IH, HP   • COLONOSCOPY N/A 12/03/2018    Procedure: COLONOSCOPY TO CECUM AND INTO TERMINAL ILEUM WITH COLD BIOPSIES AND COLD BIOPSY POLYPECTOMY;  Surgeon: Syd Emerson MD;  Location: [facility] ENDOSCOPY;  Service: Gastroenterology   • ENDOSCOPY N/A 12/03/2018    Procedure: ESOPHAGOGASTRODUODENOSCOPY WITH COLD BIOPSIES;  Surgeon: Syd Emerson MD;  Location: [facility] ENDOSCOPY;  Service: Gastroenterology   • LAPAROSCOPIC TUBAL LIGATION Bilateral    • TEAR DUCT SURGERY  06/2017   • TUBAL ABDOMINAL LIGATION           FAMILY HISTORY  Family History   Problem Relation Age of Onset   • COPD Mother    • Heart disease Father    • Heart disease Brother    • Cancer Brother    • COPD Brother    • Breast cancer Sister    • No Known Problems Daughter    • No Known Problems Son    • No Known Problems Maternal Grandmother    • No Known Problems Paternal Grandmother    • No Known Problems Maternal Aunt    • No Known Problems Paternal Aunt    • BRCA 1/2 Neg Hx    • Colon cancer Neg Hx    • Endometrial cancer Neg Hx    • Ovarian cancer Neg Hx          SOCIAL HISTORY  Social History     Socioeconomic History   • Marital status:    Tobacco Use   • Smoking status: Never   • Smokeless tobacco: Never   Vaping Use   • Vaping Use: Never used   Substance and Sexual Activity   • Alcohol use: Not Currently     Alcohol/week: 2.0 standard drinks     Types: 2 Shots of liquor per week     Comment: 2 x weekly   • Drug use: No   • Sexual activity: Not Currently     Partners: Male      Birth control/protection: Post-menopausal         ALLERGIES  Phenergan [promethazine hcl]        REVIEW OF SYSTEMS  Review of Systems   Constitutional: Negative for fever.   Respiratory: Negative for shortness of breath.    Cardiovascular: Negative for chest pain.   Musculoskeletal: Positive for arthralgias (Left hand/wrist). Negative for neck pain.   Skin: Negative for color change.        All systems reviewed and negative except for those discussed in HPI.       PHYSICAL EXAM    I have reviewed the triage vital signs and nursing notes.    ED Triage Vitals   Temp Heart Rate Resp BP SpO2   05/28/23 1219 05/28/23 1219 05/28/23 1219 05/28/23 1226 05/28/23 1219   99.1 °F (37.3 °C) 75 16 143/73 91 %      Temp src Heart Rate Source Patient Position BP Location FiO2 (%)   05/28/23 1219 05/28/23 1219 -- -- --   Tympanic Monitor          Physical Exam  GENERAL: alert, no acute distress  SKIN: Warm, dry  HENT: Normocephalic, atraumatic  EYES: no scleral icterus  CV: regular rhythm, regular rate  RESPIRATORY: normal effort, lungs clear  ABDOMEN: soft, nontender, nondistended  MUSCULOSKELETAL: Pain mostly over the left radial wrist and base of left thumb, increased pain with ulnar and radial deviation of the wrist, no overlying erythema or significant swelling noted no deformity  NEURO: alert, moves all extremities, follows commands          LAB RESULTS  No results found for this or any previous visit (from the past 24 hour(s)).    Ordered the above labs and independently reviewed and interpreted the results.        RADIOLOGY  XR Wrist 3+ View Left    Result Date: 5/28/2023  PROCEDURE:  XR WRIST 3+ VW LEFT-  HISTORY: Pain.  COMPARISON: None.  FINDINGS:   4 views of the left wrist were obtained.  There are small corticated osseous fragments at the radial base of the first metacarpal and ulnar base of the fifth metacarpal, which are likely chronic, possibly degenerative. No acute fracture or malalignment is identified. There is  scattered at least mild osteoarthritis. There is mild soft tissue swelling.  This report was finalized on 5/28/2023 1:29 PM by Dr. Tanja Weber M.D.        I ordered the above noted radiological studies. Independently reviewed and interpreted by me.  See dictation for official radiology interpretation.      PROCEDURES    Procedures  Splint Application:  Splint Type: thumb spica velcro brace  Indication: pain  Splint placed by ERT  Post splint application:   1) neurovascularly intact   2) good position  Discussed splint care with patient  Discussed PMD/orthopedic follow up        MEDICATIONS GIVEN IN ER    Medications   HYDROcodone-acetaminophen (NORCO) 5-325 MG per tablet 1 tablet (1 tablet Oral Given 5/28/23 1330)         PROGRESS, DATA ANALYSIS, CONSULTS, AND MEDICAL DECISION MAKING    All labs have been independently reviewed and interpreted by me.  All radiology studies have been independently reviewed and interpreted by me and discussed with radiologist dictating the report.   EKG's independently reviewed and interpreted by me.  Discussion below represents my analysis of pertinent findings related to patient's condition, differential diagnosis, treatment plan and final disposition.    Differential diagnosis: fracture, dislocation, tendinitis    ED Course as of 05/30/23 0237   Sun May 28, 2023   1333 XR Wrist 3+ View Left  Radiology study independently interpreted by me and my findings are no acute fracture.   [DC]      ED Course User Index  [DC] Kristine Armando PA           PPE: Patient was placed in face mask in first look. Patient was wearing facemask when I entered the room and throughout our encounter. I wore full protective equipment throughout this patient encounter including a face mask, and gloves. Hand hygiene was performed before donning protective equipment and after removal when leaving the room.        AS OF 02:37 EDT VITALS:    BP - 143/73  HR - 75  TEMP - 99.1 °F (37.3 °C) (Tympanic)  O2 SATS -  91%        DIAGNOSIS  Final diagnoses:   Acute pain of left wrist         DISPOSITION  ED Disposition     ED Disposition   Discharge    Condition   Stable    Comment   --                Note Disclaimer: At [facility], we believe that sharing information builds trust and better relationships. You are receiving this note because you recently visited [facility]. It is possible you will see health information before a provider has talked with you about it. This kind of information can be easy to misunderstand. To help you fully understand what it means for your health, we urge you to discuss this note with your provider.       Kristine Armando PA  05/30/23 0237

## 2023-05-28 NOTE — ED PROVIDER NOTES
"The ALBA and I have discussed this patient's history, physical exam, and treatment plan.  I have reviewed the documentation and personally had a face to face interaction with the patient. I affirm the documentation and agree with the treatment and plan.  The attached note describes my personal findings.      I provided a substantive portion of the care of the patient.  I personally performed the physical exam in its entirety, and below are my findings.     Brief history of present illness: 62-year-old female complaining of left wrist pain that radiates into her hand.  She had trauma to the lateral ulna 2 weeks ago but has been recovering from that without difficulty when 2 days ago she started having discomfort in her wrist.  No other new trauma.    Physical exam:    /73   Pulse 75   Temp 99.1 °F (37.3 °C) (Tympanic)   Resp 16   Ht 160 cm (63\")   Wt 74.8 kg (165 lb)   SpO2 91%   BMI 29.23 kg/m²       Physical Exam   Constitutional: No distress.  Nontoxic  HENT:  Head: Normocephalic and atraumatic.   Oropharynx: Mucous membranes are moist.   Eyes: . No scleral icterus. No conjunctival pallor.  Neck: Normal range of motion. Neck supple.   Cardiovascular: Pink warm and well perfused throughout.    Pulmonary/Chest: No respiratory distress.   Musculoskeletal: Moves all extremities equally.  Small area of ecchymosis over the left thumb base.  There is discomfort palpation all about the thenar eminence without any swelling or erythema to suggest acute trauma or infectious process.  Neurological: Alert and oriented.  No acute focal deficit appreciated.  Skin: Skin is pink, warm, and dry.   Psychiatric: Mood and affect normal.   Nursing note and vitals reviewed.        MDM:   RADIOLOGY      Study: 4 views left wrist  Findings: Odd lucency radial side of distal radius potentially represents nondisplaced chip/avulsion fracture  I independently viewed and interpreted these images contemporaneously with treatment. "       Agree with planned radiologic evaluation, treatment of discomfort and disposition accordingly.       Heriberto Vázquez MD  05/28/23 8673    [facility] query complete. Treatment plan to include limited course of prescribed controlled substance.  Risks including addiction, tolerance, sedation, benefits and alternatives presented to patient.       Heriberto Vázquez MD  05/28/23 9765

## 2023-06-22 PROBLEM — L03.115 CELLULITIS OF RIGHT LOWER EXTREMITY: Status: ACTIVE | Noted: 2023-06-22

## 2023-09-07 DIAGNOSIS — E78.5 HYPERLIPIDEMIA, UNSPECIFIED HYPERLIPIDEMIA TYPE: Primary | ICD-10-CM

## 2023-09-07 DIAGNOSIS — Z00.00 HEALTH CARE MAINTENANCE: ICD-10-CM

## 2023-09-13 LAB
ALBUMIN SERPL-MCNC: 4.5 G/DL (ref 3.5–5.2)
ALBUMIN/GLOB SERPL: 2 G/DL
ALP SERPL-CCNC: 59 U/L (ref 39–117)
ALT SERPL-CCNC: 19 U/L (ref 1–33)
APPEARANCE UR: CLEAR
AST SERPL-CCNC: 16 U/L (ref 1–32)
BACTERIA #/AREA URNS HPF: NORMAL /HPF
BASOPHILS # BLD AUTO: 0.03 10*3/MM3 (ref 0–0.2)
BASOPHILS NFR BLD AUTO: 0.7 % (ref 0–1.5)
BILIRUB SERPL-MCNC: 0.4 MG/DL (ref 0–1.2)
BILIRUB UR QL STRIP: NEGATIVE
BUN SERPL-MCNC: 20 MG/DL (ref 8–23)
BUN/CREAT SERPL: 28.6 (ref 7–25)
CALCIUM SERPL-MCNC: 9.5 MG/DL (ref 8.6–10.5)
CASTS URNS QL MICRO: NORMAL /LPF
CHLORIDE SERPL-SCNC: 105 MMOL/L (ref 98–107)
CHOLEST SERPL-MCNC: 208 MG/DL (ref 0–200)
CO2 SERPL-SCNC: 26.1 MMOL/L (ref 22–29)
COLOR UR: YELLOW
CREAT SERPL-MCNC: 0.7 MG/DL (ref 0.57–1)
EGFRCR SERPLBLD CKD-EPI 2021: 98.5 ML/MIN/1.73
EOSINOPHIL # BLD AUTO: 0.1 10*3/MM3 (ref 0–0.4)
EOSINOPHIL NFR BLD AUTO: 2.4 % (ref 0.3–6.2)
EPI CELLS #/AREA URNS HPF: NORMAL /HPF (ref 0–10)
ERYTHROCYTE [DISTWIDTH] IN BLOOD BY AUTOMATED COUNT: 12.7 % (ref 12.3–15.4)
GLOBULIN SER CALC-MCNC: 2.2 GM/DL
GLUCOSE SERPL-MCNC: 110 MG/DL (ref 65–99)
GLUCOSE UR QL STRIP: NEGATIVE
HCT VFR BLD AUTO: 39 % (ref 34–46.6)
HDLC SERPL-MCNC: 44 MG/DL (ref 40–60)
HGB BLD-MCNC: 13.3 G/DL (ref 12–15.9)
HGB UR QL STRIP: ABNORMAL
IMM GRANULOCYTES # BLD AUTO: 0.02 10*3/MM3 (ref 0–0.05)
IMM GRANULOCYTES NFR BLD AUTO: 0.5 % (ref 0–0.5)
KETONES UR QL STRIP: NEGATIVE
LDLC SERPL CALC-MCNC: 120 MG/DL (ref 0–100)
LEUKOCYTE ESTERASE UR QL STRIP: NEGATIVE
LYMPHOCYTES # BLD AUTO: 0.93 10*3/MM3 (ref 0.7–3.1)
LYMPHOCYTES NFR BLD AUTO: 22.6 % (ref 19.6–45.3)
MCH RBC QN AUTO: 31.4 PG (ref 26.6–33)
MCHC RBC AUTO-ENTMCNC: 34.1 G/DL (ref 31.5–35.7)
MCV RBC AUTO: 92 FL (ref 79–97)
MICRO URNS: ABNORMAL
MONOCYTES # BLD AUTO: 0.3 10*3/MM3 (ref 0.1–0.9)
MONOCYTES NFR BLD AUTO: 7.3 % (ref 5–12)
NEUTROPHILS # BLD AUTO: 2.73 10*3/MM3 (ref 1.7–7)
NEUTROPHILS NFR BLD AUTO: 66.5 % (ref 42.7–76)
NITRITE UR QL STRIP: NEGATIVE
NRBC BLD AUTO-RTO: 0 /100 WBC (ref 0–0.2)
PH UR STRIP: 5.5 [PH] (ref 5–7.5)
PLATELET # BLD AUTO: 198 10*3/MM3 (ref 140–450)
POTASSIUM SERPL-SCNC: 4.2 MMOL/L (ref 3.5–5.2)
PROT SERPL-MCNC: 6.7 G/DL (ref 6–8.5)
PROT UR QL STRIP: NEGATIVE
RBC # BLD AUTO: 4.24 10*6/MM3 (ref 3.77–5.28)
RBC #/AREA URNS HPF: NORMAL /HPF (ref 0–2)
SODIUM SERPL-SCNC: 141 MMOL/L (ref 136–145)
SP GR UR STRIP: 1.02 (ref 1–1.03)
TRIGL SERPL-MCNC: 252 MG/DL (ref 0–150)
TSH SERPL DL<=0.005 MIU/L-ACNC: 2.55 UIU/ML (ref 0.27–4.2)
URINALYSIS REFLEX: ABNORMAL
UROBILINOGEN UR STRIP-MCNC: 0.2 MG/DL (ref 0.2–1)
VLDLC SERPL CALC-MCNC: 44 MG/DL (ref 5–40)
WBC # BLD AUTO: 4.11 10*3/MM3 (ref 3.4–10.8)
WBC #/AREA URNS HPF: NORMAL /HPF (ref 0–5)

## 2023-09-18 RX ORDER — VALSARTAN 320 MG/1
TABLET ORAL
Qty: 90 TABLET | Refills: 3 | Status: SHIPPED | OUTPATIENT
Start: 2023-09-18

## 2023-09-19 ENCOUNTER — OFFICE VISIT (OUTPATIENT)
Dept: INTERNAL MEDICINE | Facility: CLINIC | Age: 63
End: 2023-09-19
Payer: COMMERCIAL

## 2023-09-19 VITALS
BODY MASS INDEX: 31.01 KG/M2 | SYSTOLIC BLOOD PRESSURE: 146 MMHG | OXYGEN SATURATION: 97 % | HEIGHT: 63 IN | WEIGHT: 175 LBS | HEART RATE: 56 BPM | DIASTOLIC BLOOD PRESSURE: 82 MMHG

## 2023-09-19 DIAGNOSIS — F41.9 ANXIETY: ICD-10-CM

## 2023-09-19 DIAGNOSIS — Z00.00 HEALTH CARE MAINTENANCE: Primary | ICD-10-CM

## 2023-09-19 DIAGNOSIS — M25.511 CHRONIC RIGHT SHOULDER PAIN: ICD-10-CM

## 2023-09-19 DIAGNOSIS — E78.5 HYPERLIPIDEMIA, UNSPECIFIED HYPERLIPIDEMIA TYPE: ICD-10-CM

## 2023-09-19 DIAGNOSIS — Z12.31 BREAST CANCER SCREENING BY MAMMOGRAM: ICD-10-CM

## 2023-09-19 DIAGNOSIS — G89.29 CHRONIC RIGHT SHOULDER PAIN: ICD-10-CM

## 2023-09-19 DIAGNOSIS — I10 HYPERTENSION, UNSPECIFIED TYPE: ICD-10-CM

## 2023-09-19 NOTE — PROGRESS NOTES
"Subjective   CPe  Right shoulder pain  Anxiety    Brandy El is a 63 y.o. female who presents for a complete physical exam, to review current issues, and to discuss acute needs. Patient reports shoulder pain starting approx 2 months ago. She now has limited shoulder mobility and usage of her arm from this. Patient has hypertension, hypelip, anxiety. Patient notes that anxiety is fairly well managed w/ current meds. However\"I would have to be in a coma to not feel anxious\". Met w/ psychiatrist yesterday. Last therapy session was 2-3 months ago.           Review of Systems   Constitutional: Negative.    HENT: Negative.     Eyes: Negative.    Respiratory: Negative.     Cardiovascular: Negative.    Gastrointestinal: Negative.    Endocrine: Negative.    Genitourinary: Negative.    Musculoskeletal: Negative.    Allergic/Immunologic: Negative.    Neurological: Negative.    Hematological: Negative.    Psychiatric/Behavioral: Negative.       The following portions of the patient's history were reviewed and updated as appropriate: allergies, current medications, past family history, past medical history, past social history, past surgical history, and problem list.     Patient Active Problem List   Diagnosis    Health care maintenance    Hyperlipidemia    History of MRSA infection    Dyspepsia    Change in bowel habits    FH: esophageal cancer    Anxiety    Cellulitis of right lower extremity       Past Medical History:   Diagnosis Date    Anxiety     ASCUS of cervix with negative high risk HPV 09/2001    ASCUS of cervix with negative high risk HPV 09/2005    Colon polyp 2013    Depression     Essential hematuria     Gait disturbance     R foot drop    Hyperlipidemia     Hypertension 2020    Irritable bowel syndrome many years ago    Postmenopausal     Vaginal delivery        Past Surgical History:   Procedure Laterality Date    ANKLE SURGERY      BACK SURGERY      COLONOSCOPY  10/22/2013    tics, IH, HP    COLONOSCOPY N/A " 12/03/2018    Procedure: COLONOSCOPY TO CECUM AND INTO TERMINAL ILEUM WITH COLD BIOPSIES AND COLD BIOPSY POLYPECTOMY;  Surgeon: Syd Emerson MD;  Location: [facility] ENDOSCOPY;  Service: Gastroenterology    ENDOSCOPY N/A 12/03/2018    Procedure: ESOPHAGOGASTRODUODENOSCOPY WITH COLD BIOPSIES;  Surgeon: Syd Emerson MD;  Location: [facility] ENDOSCOPY;  Service: Gastroenterology    LAPAROSCOPIC TUBAL LIGATION Bilateral     TEAR DUCT SURGERY  06/2017    TUBAL ABDOMINAL LIGATION         Family History   Problem Relation Age of Onset    COPD Mother     Heart disease Father     Heart disease Brother     Cancer Brother     COPD Brother     Breast cancer Sister     No Known Problems Daughter     No Known Problems Son     No Known Problems Maternal Grandmother     No Known Problems Paternal Grandmother     No Known Problems Maternal Aunt     No Known Problems Paternal Aunt     BRCA 1/2 Neg Hx     Colon cancer Neg Hx     Endometrial cancer Neg Hx     Ovarian cancer Neg Hx        Social History     Socioeconomic History    Marital status:    Tobacco Use    Smoking status: Never    Smokeless tobacco: Never   Vaping Use    Vaping Use: Never used   Substance and Sexual Activity    Alcohol use: Not Currently     Alcohol/week: 2.0 standard drinks     Types: 2 Shots of liquor per week     Comment: 2 x weekly    Drug use: No    Sexual activity: Not Currently     Partners: Male     Birth control/protection: Post-menopausal       Current Outpatient Medications on File Prior to Visit   Medication Sig Dispense Refill    atorvastatin (LIPITOR) 20 MG tablet TAKE ONE TABLET BY MOUTH DAILY 90 tablet 1    buPROPion XL (WELLBUTRIN XL) 150 MG 24 hr tablet Take 3 tablets by mouth Every Morning.      clonazePAM (KlonoPIN) 0.5 MG tablet Take 1 tablet by mouth 2 (Two) Times a Day As Needed for Anxiety. 30 tablet 2    escitalopram (LEXAPRO) 10 MG tablet       fluticasone (FLONASE) 50 MCG/ACT nasal spray 2 sprays into the nostril(s) as directed by  "provider Daily. 16 g 3    propranolol LA (INDERAL LA) 60 MG 24 hr capsule TAKE ONE CAPSULE BY MOUTH DAILY 30 capsule 4    traZODone (DESYREL) 50 MG tablet       valsartan (DIOVAN) 320 MG tablet TAKE ONE TABLET BY MOUTH DAILY 90 tablet 3     No current facility-administered medications on file prior to visit.       Allergies   Allergen Reactions    Phenergan [Promethazine Hcl] Anxiety       Immunization History   Administered Date(s) Administered    COVID-19 (PFIZER) Purple Cap Monovalent 03/01/2021, 04/01/2021    Flu Vaccine Quad PF >36MO 10/01/2022    Fluzone (or Fluarix & Flulaval for VFC) >6mos 10/01/2022    Hepatitis A 08/13/2018, 08/19/2019    Tdap 06/25/2013, 09/16/2022       Objective     /82   Pulse 56   Ht 160 cm (63\")   Wt 79.4 kg (175 lb)   SpO2 97%   BMI 31.00 kg/m²     Physical Exam  Vitals and nursing note reviewed.   Constitutional:       Appearance: Normal appearance. She is well-developed.   HENT:      Head: Normocephalic and atraumatic.      Right Ear: Tympanic membrane and external ear normal.      Left Ear: Tympanic membrane and external ear normal.      Nose: Nose normal.      Mouth/Throat:      Mouth: Mucous membranes are moist.   Eyes:      Extraocular Movements: Extraocular movements intact.      Pupils: Pupils are equal, round, and reactive to light.   Cardiovascular:      Rate and Rhythm: Normal rate and regular rhythm.      Pulses: Normal pulses.      Heart sounds: Normal heart sounds.   Pulmonary:      Effort: Pulmonary effort is normal. No respiratory distress.      Breath sounds: Normal breath sounds.   Abdominal:      General: Abdomen is flat.      Palpations: Abdomen is soft.   Musculoskeletal:         General: Normal range of motion.      Cervical back: Normal range of motion and neck supple.   Skin:     General: Skin is warm and dry.   Neurological:      General: No focal deficit present.      Mental Status: She is alert and oriented to person, place, and time. "   Psychiatric:         Mood and Affect: Mood normal.         Behavior: Behavior normal.         Thought Content: Thought content normal.         Judgment: Judgment normal.       Assessment & Plan   Diagnoses and all orders for this visit:    1. Health care maintenance (Primary)    2. Anxiety    3. Chronic right shoulder pain    4. Hyperlipidemia, unspecified hyperlipidemia type    5. Hypertension, unspecified type    Other orders  -     Fluzone >6 Months ([id number])        Discussion    Patient presents today for a CPE.  Patient is counseled james healthy diet and exercise regimen. Mammogram is up to date.   Colon cancer screening is up to date.   Pap smears are performed by the patient's gynecologist.   Immunizations are up to date.  Patient suffers from anxiety and depression after loss of spouse. Advised individual and possibly group counseling. She will f/u w/ psychiatrist routinely. She is to monitor bp regularly and coutneing current meds for lipd and bp regulation. Consume nutriton low in carbs fats and sodium. To engage in positive creative and active engagements. F/u in office in 6mo or prn.          Future Appointments   Date Time Provider Department Center   3/12/2024  8:30 AM [facility]   3/19/2024  8:45 AM Tiffanie Woodard MD [facility]

## 2023-10-13 ENCOUNTER — HOSPITAL ENCOUNTER (OUTPATIENT)
Dept: MAMMOGRAPHY | Facility: HOSPITAL | Age: 63
Discharge: HOME | End: 2023-10-13
Admitting: INTERNAL MEDICINE
Payer: COMMERCIAL

## 2023-10-13 DIAGNOSIS — Z12.31 BREAST CANCER SCREENING BY MAMMOGRAM: ICD-10-CM

## 2023-10-13 PROCEDURE — 77067 SCR MAMMO BI INCL CAD: CPT

## 2023-10-13 PROCEDURE — 77063 BREAST TOMOSYNTHESIS BI: CPT

## 2023-11-27 RX ORDER — PROPRANOLOL HCL 60 MG
60 CAPSULE, EXTENDED RELEASE 24HR ORAL DAILY
Qty: 30 CAPSULE | Refills: 4 | Status: SHIPPED | OUTPATIENT
Start: 2023-11-27

## 2024-01-08 RX ORDER — ATORVASTATIN CALCIUM 20 MG/1
20 TABLET, FILM COATED ORAL DAILY
Qty: 90 TABLET | Refills: 1 | Status: SHIPPED | OUTPATIENT
Start: 2024-01-08

## 2024-03-07 DIAGNOSIS — E78.5 HYPERLIPIDEMIA, UNSPECIFIED HYPERLIPIDEMIA TYPE: Primary | ICD-10-CM

## 2024-03-11 NOTE — ANESTHESIA POSTPROCEDURE EVALUATION
Patient: Brandy El    Procedure Summary     Date:  12/03/18 Room / Location:  [facility] ENDOSCOPY 8 /  [facility]    Anesthesia Start:  1354 Anesthesia Stop:  1436    Procedures:       ESOPHAGOGASTRODUODENOSCOPY WITH COLD BIOPSIES (N/A Esophagus)      COLONOSCOPY TO CECUM AND INTO TERMINAL ILEUM WITH COLD BIOPSIES AND COLD BIOPSY POLYPECTOMY (N/A ) Diagnosis:       Dyspepsia      Change in bowel habits      FH: esophageal cancer      (Dyspepsia [R10.13])      (Change in bowel habits [R19.4])      (FH: esophageal cancer [Z80.0])    Surgeon:  Syd Emerson MD Provider:  Jairo Esquivel MD    Anesthesia Type:  MAC ASA Status:  2          Anesthesia Type: MAC  Last vitals  BP   118/60 (12/03/18 1457)   Temp   36.6 °C (97.8 °F) (12/03/18 1457)   Pulse   60 (12/03/18 1457)   Resp   16 (12/03/18 1457)     SpO2   94 % (12/03/18 1457)     Post Anesthesia Care and Evaluation    Patient location during evaluation: PACU  Patient participation: complete - patient participated  Level of consciousness: awake  Pain score: 0  Pain management: adequate  Airway patency: patent  Anesthetic complications: No anesthetic complications  PONV Status: none  Cardiovascular status: acceptable  Respiratory status: acceptable  Hydration status: acceptable    Comments: /60 (BP Location: Left arm, Patient Position: Lying)   Pulse 60   Temp 36.6 °C (97.8 °F) (Oral)   Resp 16   Wt 75.9 kg (167 lb 7 oz)   SpO2 94%   BMI 29.66 kg/m²        98

## 2024-03-12 LAB
ALBUMIN SERPL-MCNC: 4.3 G/DL (ref 3.5–5.2)
ALBUMIN/GLOB SERPL: 2.2 G/DL
ALP SERPL-CCNC: 63 U/L (ref 39–117)
ALT SERPL-CCNC: 20 U/L (ref 1–33)
AST SERPL-CCNC: 13 U/L (ref 1–32)
BILIRUB SERPL-MCNC: 0.3 MG/DL (ref 0–1.2)
BUN SERPL-MCNC: 23 MG/DL (ref 8–23)
BUN/CREAT SERPL: 18.9 (ref 7–25)
CALCIUM SERPL-MCNC: 8.9 MG/DL (ref 8.6–10.5)
CHLORIDE SERPL-SCNC: 104 MMOL/L (ref 98–107)
CHOLEST SERPL-MCNC: 205 MG/DL (ref 0–200)
CO2 SERPL-SCNC: 23.9 MMOL/L (ref 22–29)
CREAT SERPL-MCNC: 1.22 MG/DL (ref 0.57–1)
EGFRCR SERPLBLD CKD-EPI 2021: 50.6 ML/MIN/1.73
GLOBULIN SER CALC-MCNC: 2 GM/DL
GLUCOSE SERPL-MCNC: 109 MG/DL (ref 65–99)
HDLC SERPL-MCNC: 37 MG/DL (ref 40–60)
LDLC SERPL CALC-MCNC: 88 MG/DL (ref 0–100)
POTASSIUM SERPL-SCNC: 4.3 MMOL/L (ref 3.5–5.2)
PROT SERPL-MCNC: 6.3 G/DL (ref 6–8.5)
SODIUM SERPL-SCNC: 139 MMOL/L (ref 136–145)
TRIGL SERPL-MCNC: 491 MG/DL (ref 0–150)
VLDLC SERPL CALC-MCNC: 80 MG/DL (ref 5–40)

## 2024-03-19 ENCOUNTER — OFFICE VISIT (OUTPATIENT)
Dept: INTERNAL MEDICINE | Facility: CLINIC | Age: 64
End: 2024-03-19
Payer: COMMERCIAL

## 2024-03-19 VITALS
OXYGEN SATURATION: 95 % | HEART RATE: 61 BPM | HEIGHT: 63 IN | DIASTOLIC BLOOD PRESSURE: 72 MMHG | SYSTOLIC BLOOD PRESSURE: 134 MMHG | BODY MASS INDEX: 31.01 KG/M2 | WEIGHT: 175 LBS

## 2024-03-19 DIAGNOSIS — R30.9 URINARY PAIN: ICD-10-CM

## 2024-03-19 DIAGNOSIS — R79.89 ELEVATED SERUM CREATININE: ICD-10-CM

## 2024-03-19 DIAGNOSIS — E78.1 HYPERTRIGLYCERIDEMIA: ICD-10-CM

## 2024-03-19 DIAGNOSIS — L30.9 DERMATITIS: ICD-10-CM

## 2024-03-19 DIAGNOSIS — I10 HYPERTENSION, UNSPECIFIED TYPE: Primary | ICD-10-CM

## 2024-03-19 PROCEDURE — 99214 OFFICE O/P EST MOD 30 MIN: CPT | Performed by: INTERNAL MEDICINE

## 2024-03-19 RX ORDER — OMEGA-3-ACID ETHYL ESTERS 1 G/1
2 CAPSULE, LIQUID FILLED ORAL 2 TIMES DAILY
Qty: 120 CAPSULE | Refills: 3 | Status: SHIPPED | OUTPATIENT
Start: 2024-03-19

## 2024-03-19 RX ORDER — AMLODIPINE BESYLATE 2.5 MG/1
2.5 TABLET ORAL DAILY
Qty: 90 TABLET | Refills: 1 | Status: SHIPPED | OUTPATIENT
Start: 2024-03-19

## 2024-03-19 NOTE — PROGRESS NOTES
Chief Complaint   Patient presents with    Hypertension    Hyperlipidemia    hyertriglyceridemia       Hypertension    Hyperlipidemia       Brandy El is a 63 y.o. female presents for follow up on htn and hyperlipidemia. Patient has elevated cr on testing. She does note some recent ibuprofen usage. She has elevated triglycerides. Has been eating increased red meat.     The following portions of the patient's history were reviewed and updated as appropriate: allergies, current medications, past family history, past medical history, past social history, past surgical history and problem list.  Current Outpatient Medications on File Prior to Visit   Medication Sig Dispense Refill    atorvastatin (LIPITOR) 20 MG tablet TAKE 1 TABLET BY MOUTH DAILY 90 tablet 1    buPROPion XL (WELLBUTRIN XL) 150 MG 24 hr tablet Take 3 tablets by mouth Every Morning.      clonazePAM (KlonoPIN) 0.5 MG tablet Take 1 tablet by mouth 2 (Two) Times a Day As Needed for Anxiety. 30 tablet 2    escitalopram (LEXAPRO) 10 MG tablet       fluticasone (FLONASE) 50 MCG/ACT nasal spray 2 sprays into the nostril(s) as directed by provider Daily. 16 g 3    propranolol LA (INDERAL LA) 60 MG 24 hr capsule TAKE 1 CAPSULE BY MOUTH DAILY 30 capsule 4    traZODone (DESYREL) 50 MG tablet       valsartan (DIOVAN) 320 MG tablet TAKE ONE TABLET BY MOUTH DAILY 90 tablet 3    cetirizine (zyrTEC) 10 MG tablet Take 1 tablet by mouth Daily for 30 days. 30 tablet 0    [DISCONTINUED] azithromycin (ZITHROMAX) 250 MG tablet Take as instructed 6 tablet 0     No current facility-administered medications on file prior to visit.     Review of Systems   Constitutional: Negative.    HENT: Negative.     Eyes: Negative.    Respiratory: Negative.     Cardiovascular: Negative.    Gastrointestinal: Negative.    Endocrine: Negative.    Genitourinary: Negative.    Musculoskeletal: Negative.    Skin: Negative.    Allergic/Immunologic: Negative.    Neurological: Negative.   "  Hematological: Negative.    Psychiatric/Behavioral: Negative.         Objective   Physical Exam  Vitals and nursing note reviewed.   Constitutional:       Appearance: Normal appearance.   HENT:      Head: Normocephalic and atraumatic.      Right Ear: Tympanic membrane normal.      Left Ear: Tympanic membrane normal.      Nose: Nose normal.      Mouth/Throat:      Mouth: Mucous membranes are moist. Mucous membranes are dry.   Eyes:      Extraocular Movements: Extraocular movements intact.      Pupils: Pupils are equal, round, and reactive to light.   Cardiovascular:      Rate and Rhythm: Normal rate and regular rhythm.      Pulses: Normal pulses.      Heart sounds: Normal heart sounds.   Pulmonary:      Effort: Pulmonary effort is normal.      Breath sounds: Normal breath sounds.   Abdominal:      General: Abdomen is flat.      Palpations: Abdomen is soft.   Musculoskeletal:         General: Normal range of motion.      Cervical back: Normal range of motion.   Skin:     General: Skin is warm and dry.      Comments: Bilateral ear irritation dry flaking   Neurological:      General: No focal deficit present.      Mental Status: She is alert and oriented to person, place, and time.   Psychiatric:         Mood and Affect: Mood normal.         Behavior: Behavior normal.         Thought Content: Thought content normal.         Judgment: Judgment normal.          /72   Pulse 61   Ht 160 cm (63\")   Wt 79.4 kg (175 lb)   SpO2 95%   BMI 31.00 kg/m²     Assessment & Plan   Diagnoses and all orders for this visit:    Hypertension, unspecified type  -     Basic Metabolic Panel    Elevated serum creatinine  -     Basic Metabolic Panel    Hypertriglyceridemia    Urinary pain  -     Urinalysis With Culture If Indicated -  -     Basic Metabolic Panel    Dermatitis  -     Ambulatory Referral to ENT (Otolaryngology)    Other orders  -     omega-3 acid ethyl esters (Lovaza) 1 g capsule; Take 2 capsules by mouth 2 (Two) " Times a Day.  -     amLODIPine (NORVASC) 2.5 MG tablet; Take 1 tablet by mouth Daily.        Patient w hypertension. Elevated creatinine. Tried reducing valsartan to 160 mg. To retest today. Will start low dose amlodipine. She has high triglycerides. Counseled on lifestyle modification and added lovaza. Emphasized movement and nutrtion. She has dermatitis B ears. To ENT for further evaluation and treatment. She will f/u here in 3 months or prn.

## 2024-03-21 LAB
APPEARANCE UR: CLEAR
BACTERIA #/AREA URNS HPF: NORMAL /HPF
BACTERIA UR CULT: NORMAL
BACTERIA UR CULT: NORMAL
BILIRUB UR QL STRIP: NEGATIVE
BUN SERPL-MCNC: 16 MG/DL (ref 8–23)
BUN/CREAT SERPL: 18.2 (ref 7–25)
CALCIUM SERPL-MCNC: 8.9 MG/DL (ref 8.6–10.5)
CASTS URNS QL MICRO: NORMAL /LPF
CHLORIDE SERPL-SCNC: 106 MMOL/L (ref 98–107)
CO2 SERPL-SCNC: 24.2 MMOL/L (ref 22–29)
COLOR UR: YELLOW
CREAT SERPL-MCNC: 0.88 MG/DL (ref 0.57–1)
EGFRCR SERPLBLD CKD-EPI 2021: 74.9 ML/MIN/1.73
EPI CELLS #/AREA URNS HPF: NORMAL /HPF (ref 0–10)
GLUCOSE SERPL-MCNC: 100 MG/DL (ref 65–99)
GLUCOSE UR QL STRIP: NEGATIVE
HGB UR QL STRIP: ABNORMAL
KETONES UR QL STRIP: NEGATIVE
LEUKOCYTE ESTERASE UR QL STRIP: ABNORMAL
MICRO URNS: ABNORMAL
NITRITE UR QL STRIP: NEGATIVE
PH UR STRIP: 5.5 [PH] (ref 5–7.5)
POTASSIUM SERPL-SCNC: 4.2 MMOL/L (ref 3.5–5.2)
PROT UR QL STRIP: NEGATIVE
RBC #/AREA URNS HPF: NORMAL /HPF (ref 0–2)
SODIUM SERPL-SCNC: 140 MMOL/L (ref 136–145)
SP GR UR STRIP: 1.02 (ref 1–1.03)
URINALYSIS REFLEX: ABNORMAL
UROBILINOGEN UR STRIP-MCNC: 0.2 MG/DL (ref 0.2–1)
WBC #/AREA URNS HPF: NORMAL /HPF (ref 0–5)

## 2024-04-24 RX ORDER — PROPRANOLOL HCL 60 MG
60 CAPSULE, EXTENDED RELEASE 24HR ORAL DAILY
Qty: 30 CAPSULE | Refills: 4 | Status: SHIPPED | OUTPATIENT
Start: 2024-04-24

## 2024-06-12 ENCOUNTER — PATIENT ROUNDING (BHMG ONLY) (OUTPATIENT)
Dept: URGENT CARE | Facility: CLINIC | Age: 64
End: 2024-06-12
Payer: COMMERCIAL

## 2024-06-12 NOTE — ED NOTES
Thank you for letting us care for you during your recent visit to [facility]. We would love to hear about your experience with us. Was this the first time you have visited our location?    We’re always looking for ways to make our patients’ experiences even better. Do you have any recommendations on ways we may improve?    I appreciate you taking the time to respond. Please be on the lookout for a survey about your recent visit from [facility] via text or email. We would greatly appreciate if you could fill that out and turn it back in. We want your voice to be heard and we value your feedback.     Thank you for choosing [facility] for your healthcare needs.

## 2024-06-19 ENCOUNTER — PATIENT ROUNDING (BHMG ONLY) (OUTPATIENT)
Dept: URGENT CARE | Facility: CLINIC | Age: 64
End: 2024-06-19
Payer: COMMERCIAL

## 2024-06-19 NOTE — ED NOTES
Thank you for letting us care for you during your recent visit to [facility]. We would love to hear about your experience with us. Was this the first time you have visited our location?    We’re always looking for ways to make our patients’ experiences even better. Do you have any recommendations on ways we may improve?    I appreciate you taking the time to respond. Please be on the lookout for a survey about your recent visit from [employer] via text or email. We would greatly appreciate if you could fill that out and turn it back in. We want your voice to be heard and we value your feedback.     Thank you for choosing [facility] for your healthcare needs.

## 2024-07-03 RX ORDER — ATORVASTATIN CALCIUM 20 MG/1
20 TABLET, FILM COATED ORAL DAILY
Qty: 90 TABLET | Refills: 1 | Status: SHIPPED | OUTPATIENT
Start: 2024-07-03

## 2024-07-16 ENCOUNTER — PATIENT ROUNDING (BHMG ONLY) (OUTPATIENT)
Dept: URGENT CARE | Facility: CLINIC | Age: 64
End: 2024-07-16
Payer: COMMERCIAL

## 2024-07-16 NOTE — ED NOTES
Thank you for letting us care for you during your recent visit to [facility]. We would love to hear about your experience with us. Was this the first time you have visited our location?    We’re always looking for ways to make our patients’ experiences even better. Do you have any recommendations on ways we may improve?    I appreciate you taking the time to respond. Please be on the lookout for a survey about your recent visit from [email or web address] via text or email. We would greatly appreciate if you could fill that out and turn it back in. We want your voice to be heard and we value your feedback.     Thank you for choosing [facility] for your healthcare needs.

## 2024-07-19 RX ORDER — OMEGA-3-ACID ETHYL ESTERS 1 G/1
2 CAPSULE, LIQUID FILLED ORAL 2 TIMES DAILY
Qty: 120 CAPSULE | Refills: 3 | Status: SHIPPED | OUTPATIENT
Start: 2024-07-19

## 2024-07-27 ENCOUNTER — HOSPITAL ENCOUNTER (INPATIENT)
Facility: HOSPITAL | Age: 64
LOS: 4 days | Discharge: HOME | End: 2024-07-31
Attending: EMERGENCY MEDICINE | Admitting: STUDENT IN AN ORGANIZED HEALTH CARE EDUCATION/TRAINING PROGRAM
Payer: COMMERCIAL

## 2024-07-27 DIAGNOSIS — L03.115 CELLULITIS OF RIGHT LOWER LEG: ICD-10-CM

## 2024-07-27 DIAGNOSIS — A41.9 SEPSIS WITHOUT ACUTE ORGAN DYSFUNCTION, DUE TO UNSPECIFIED ORGANISM: Primary | ICD-10-CM

## 2024-07-27 LAB
ALBUMIN SERPL-MCNC: 4.3 G/DL (ref 3.5–5.2)
ALBUMIN/GLOB SERPL: 1.4 G/DL
ALP SERPL-CCNC: 67 U/L (ref 39–117)
ALT SERPL W P-5'-P-CCNC: 18 U/L (ref 1–33)
ANION GAP SERPL CALCULATED.3IONS-SCNC: 13 MMOL/L (ref 5–15)
AST SERPL-CCNC: 16 U/L (ref 1–32)
BASOPHILS # BLD AUTO: 0.04 10*3/MM3 (ref 0–0.2)
BASOPHILS NFR BLD AUTO: 0.2 % (ref 0–1.5)
BILIRUB SERPL-MCNC: 0.5 MG/DL (ref 0–1.2)
BUN SERPL-MCNC: 17 MG/DL (ref 8–23)
BUN/CREAT SERPL: 22.7 (ref 7–25)
CALCIUM SPEC-SCNC: 8.9 MG/DL (ref 8.6–10.5)
CHLORIDE SERPL-SCNC: 103 MMOL/L (ref 98–107)
CO2 SERPL-SCNC: 22 MMOL/L (ref 22–29)
CREAT SERPL-MCNC: 0.75 MG/DL (ref 0.57–1)
D-LACTATE SERPL-SCNC: 2 MMOL/L (ref 0.5–2)
DEPRECATED RDW RBC AUTO: 40.2 FL (ref 37–54)
EGFRCR SERPLBLD CKD-EPI 2021: 90.1 ML/MIN/1.73
EOSINOPHIL # BLD AUTO: 0.05 10*3/MM3 (ref 0–0.4)
EOSINOPHIL NFR BLD AUTO: 0.3 % (ref 0.3–6.2)
ERYTHROCYTE [DISTWIDTH] IN BLOOD BY AUTOMATED COUNT: 12.1 % (ref 12.3–15.4)
GLOBULIN UR ELPH-MCNC: 3 GM/DL
GLUCOSE SERPL-MCNC: 120 MG/DL (ref 65–99)
HCT VFR BLD AUTO: 39.6 % (ref 34–46.6)
HGB BLD-MCNC: 13.2 G/DL (ref 12–15.9)
HOLD SPECIMEN: NORMAL
HOLD SPECIMEN: NORMAL
IMM GRANULOCYTES # BLD AUTO: 0.08 10*3/MM3 (ref 0–0.05)
IMM GRANULOCYTES NFR BLD AUTO: 0.4 % (ref 0–0.5)
LYMPHOCYTES # BLD AUTO: 0.78 10*3/MM3 (ref 0.7–3.1)
LYMPHOCYTES NFR BLD AUTO: 4.3 % (ref 19.6–45.3)
MCH RBC QN AUTO: 30.3 PG (ref 26.6–33)
MCHC RBC AUTO-ENTMCNC: 33.3 G/DL (ref 31.5–35.7)
MCV RBC AUTO: 91 FL (ref 79–97)
MONOCYTES # BLD AUTO: 0.5 10*3/MM3 (ref 0.1–0.9)
MONOCYTES NFR BLD AUTO: 2.8 % (ref 5–12)
NEUTROPHILS NFR BLD AUTO: 16.57 10*3/MM3 (ref 1.7–7)
NEUTROPHILS NFR BLD AUTO: 92 % (ref 42.7–76)
NRBC BLD AUTO-RTO: 0 /100 WBC (ref 0–0.2)
PLATELET # BLD AUTO: 267 10*3/MM3 (ref 140–450)
PMV BLD AUTO: 8.9 FL (ref 6–12)
POTASSIUM SERPL-SCNC: 3.8 MMOL/L (ref 3.5–5.2)
PROT SERPL-MCNC: 7.3 G/DL (ref 6–8.5)
RBC # BLD AUTO: 4.35 10*6/MM3 (ref 3.77–5.28)
SODIUM SERPL-SCNC: 138 MMOL/L (ref 136–145)
WBC NRBC COR # BLD AUTO: 18.02 10*3/MM3 (ref 3.4–10.8)
WHOLE BLOOD HOLD COAG: NORMAL
WHOLE BLOOD HOLD SPECIMEN: NORMAL

## 2024-07-27 PROCEDURE — 25810000003 LACTATED RINGERS SOLUTION: Performed by: PHYSICIAN ASSISTANT

## 2024-07-27 PROCEDURE — 25010000002 ONDANSETRON PER 1 MG: Performed by: PHYSICIAN ASSISTANT

## 2024-07-27 PROCEDURE — 83605 ASSAY OF LACTIC ACID: CPT

## 2024-07-27 PROCEDURE — 25010000002 CEFTRIAXONE PER 250 MG: Performed by: PHYSICIAN ASSISTANT

## 2024-07-27 PROCEDURE — 99285 EMERGENCY DEPT VISIT HI MDM: CPT

## 2024-07-27 PROCEDURE — 25010000002 VANCOMYCIN 10 G RECONSTITUTED SOLUTION: Performed by: PHYSICIAN ASSISTANT

## 2024-07-27 PROCEDURE — 25810000003 SODIUM CHLORIDE 0.9 % SOLUTION: Performed by: STUDENT IN AN ORGANIZED HEALTH CARE EDUCATION/TRAINING PROGRAM

## 2024-07-27 PROCEDURE — 85025 COMPLETE CBC W/AUTO DIFF WBC: CPT

## 2024-07-27 PROCEDURE — 36415 COLL VENOUS BLD VENIPUNCTURE: CPT

## 2024-07-27 PROCEDURE — 25010000002 ENOXAPARIN PER 10 MG: Performed by: STUDENT IN AN ORGANIZED HEALTH CARE EDUCATION/TRAINING PROGRAM

## 2024-07-27 PROCEDURE — 25810000003 SODIUM CHLORIDE 0.9 % SOLUTION: Performed by: PHYSICIAN ASSISTANT

## 2024-07-27 PROCEDURE — 87040 BLOOD CULTURE FOR BACTERIA: CPT | Performed by: PHYSICIAN ASSISTANT

## 2024-07-27 PROCEDURE — 80053 COMPREHEN METABOLIC PANEL: CPT | Performed by: EMERGENCY MEDICINE

## 2024-07-27 RX ORDER — ACETAMINOPHEN 500 MG
1000 TABLET ORAL ONCE
Status: COMPLETED | OUTPATIENT
Start: 2024-07-27 | End: 2024-07-27

## 2024-07-27 RX ORDER — OXYCODONE HYDROCHLORIDE AND ACETAMINOPHEN 5; 325 MG/1; MG/1
1 TABLET ORAL EVERY 6 HOURS PRN
Status: DISCONTINUED | OUTPATIENT
Start: 2024-07-27 | End: 2024-07-31 | Stop reason: HOSPADM

## 2024-07-27 RX ORDER — AMLODIPINE BESYLATE 5 MG/1
2.5 TABLET ORAL DAILY
Status: DISCONTINUED | OUTPATIENT
Start: 2024-07-28 | End: 2024-07-31 | Stop reason: HOSPADM

## 2024-07-27 RX ORDER — ONDANSETRON 2 MG/ML
4 INJECTION INTRAMUSCULAR; INTRAVENOUS ONCE
Status: COMPLETED | OUTPATIENT
Start: 2024-07-27 | End: 2024-07-27

## 2024-07-27 RX ORDER — ESCITALOPRAM OXALATE 10 MG/1
10 TABLET ORAL NIGHTLY
Status: DISCONTINUED | OUTPATIENT
Start: 2024-07-28 | End: 2024-07-31 | Stop reason: HOSPADM

## 2024-07-27 RX ORDER — SODIUM CHLORIDE 9 MG/ML
100 INJECTION, SOLUTION INTRAVENOUS CONTINUOUS
Status: DISCONTINUED | OUTPATIENT
Start: 2024-07-27 | End: 2024-07-31 | Stop reason: HOSPADM

## 2024-07-27 RX ORDER — NITROGLYCERIN 0.4 MG/1
0.4 TABLET SUBLINGUAL
Status: DISCONTINUED | OUTPATIENT
Start: 2024-07-27 | End: 2024-07-31 | Stop reason: HOSPADM

## 2024-07-27 RX ORDER — CLONAZEPAM 0.5 MG/1
0.5 TABLET ORAL 2 TIMES DAILY PRN
Status: DISCONTINUED | OUTPATIENT
Start: 2024-07-27 | End: 2024-07-31 | Stop reason: HOSPADM

## 2024-07-27 RX ORDER — ATORVASTATIN CALCIUM 20 MG/1
20 TABLET, FILM COATED ORAL DAILY
Status: DISCONTINUED | OUTPATIENT
Start: 2024-07-28 | End: 2024-07-31 | Stop reason: HOSPADM

## 2024-07-27 RX ORDER — ACETAMINOPHEN 500 MG
1000 TABLET ORAL EVERY 8 HOURS PRN
Status: DISCONTINUED | OUTPATIENT
Start: 2024-07-27 | End: 2024-07-28

## 2024-07-27 RX ORDER — BISACODYL 10 MG
10 SUPPOSITORY, RECTAL RECTAL DAILY PRN
Status: DISCONTINUED | OUTPATIENT
Start: 2024-07-27 | End: 2024-07-31 | Stop reason: HOSPADM

## 2024-07-27 RX ORDER — SODIUM CHLORIDE 0.9 % (FLUSH) 0.9 %
10 SYRINGE (ML) INJECTION AS NEEDED
Status: DISCONTINUED | OUTPATIENT
Start: 2024-07-27 | End: 2024-07-31 | Stop reason: HOSPADM

## 2024-07-27 RX ORDER — AMOXICILLIN 250 MG
2 CAPSULE ORAL 2 TIMES DAILY PRN
Status: DISCONTINUED | OUTPATIENT
Start: 2024-07-27 | End: 2024-07-31 | Stop reason: HOSPADM

## 2024-07-27 RX ORDER — BISACODYL 5 MG/1
5 TABLET, DELAYED RELEASE ORAL DAILY PRN
Status: DISCONTINUED | OUTPATIENT
Start: 2024-07-27 | End: 2024-07-31 | Stop reason: HOSPADM

## 2024-07-27 RX ORDER — ENOXAPARIN SODIUM 100 MG/ML
40 INJECTION SUBCUTANEOUS NIGHTLY
Status: DISCONTINUED | OUTPATIENT
Start: 2024-07-27 | End: 2024-07-31 | Stop reason: HOSPADM

## 2024-07-27 RX ORDER — ESCITALOPRAM OXALATE 10 MG/1
10 TABLET ORAL DAILY
Status: DISCONTINUED | OUTPATIENT
Start: 2024-07-28 | End: 2024-07-27

## 2024-07-27 RX ORDER — SODIUM CHLORIDE 0.9 % (FLUSH) 0.9 %
10 SYRINGE (ML) INJECTION EVERY 12 HOURS SCHEDULED
Status: DISCONTINUED | OUTPATIENT
Start: 2024-07-27 | End: 2024-07-31 | Stop reason: HOSPADM

## 2024-07-27 RX ORDER — POLYETHYLENE GLYCOL 3350 17 G/17G
17 POWDER, FOR SOLUTION ORAL DAILY PRN
Status: DISCONTINUED | OUTPATIENT
Start: 2024-07-27 | End: 2024-07-31 | Stop reason: HOSPADM

## 2024-07-27 RX ORDER — VANCOMYCIN/0.9 % SOD CHLORIDE 1.5G/250ML
20 PLASTIC BAG, INJECTION (ML) INTRAVENOUS ONCE
Status: COMPLETED | OUTPATIENT
Start: 2024-07-27 | End: 2024-07-27

## 2024-07-27 RX ORDER — SODIUM CHLORIDE 9 MG/ML
40 INJECTION, SOLUTION INTRAVENOUS AS NEEDED
Status: DISCONTINUED | OUTPATIENT
Start: 2024-07-27 | End: 2024-07-31 | Stop reason: HOSPADM

## 2024-07-27 RX ADMIN — SODIUM CHLORIDE 100 ML/HR: 9 INJECTION, SOLUTION INTRAVENOUS at 21:16

## 2024-07-27 RX ADMIN — SODIUM CHLORIDE, POTASSIUM CHLORIDE, SODIUM LACTATE AND CALCIUM CHLORIDE 1000 ML: 600; 310; 30; 20 INJECTION, SOLUTION INTRAVENOUS at 19:48

## 2024-07-27 RX ADMIN — Medication 10 ML: at 21:16

## 2024-07-27 RX ADMIN — CEFTRIAXONE SODIUM 1000 MG: 1 INJECTION, POWDER, FOR SOLUTION INTRAMUSCULAR; INTRAVENOUS at 19:45

## 2024-07-27 RX ADMIN — ENOXAPARIN SODIUM 40 MG: 100 INJECTION SUBCUTANEOUS at 21:16

## 2024-07-27 RX ADMIN — ONDANSETRON 4 MG: 2 INJECTION INTRAMUSCULAR; INTRAVENOUS at 19:44

## 2024-07-27 RX ADMIN — ACETAMINOPHEN 1000 MG: 500 TABLET ORAL at 19:38

## 2024-07-27 RX ADMIN — VANCOMYCIN HYDROCHLORIDE 1500 MG: 10 INJECTION, POWDER, LYOPHILIZED, FOR SOLUTION INTRAVENOUS at 20:35

## 2024-07-27 NOTE — ED PROVIDER NOTES
MD ATTESTATION NOTE  I supervised care provided by the midlevel provider. We have discussed this patient's history, physical exam, and treatment plan. I have reviewed the midlevel provider's note and I agree with the midlevel provider's findings and plan of care.   SHARED VISIT: This visit was performed by BOTH a physician and an APC. The substantive portion of the medical decision making was performed by this attesting physician who made or approved the management plan and takes responsibility for patient management. All studies in the APC note (if performed) were independently interpreted by me.   I have personally had a face to face encounter with the patient.     PCP: Tiffanie Woodard MD  Patient Care Team:  Tiffanie Woodard MD as PCP - General  Tiffanie Woodard MD as PCP - Family Medicine     Brandy El is a 64 y.o. female who presents to the ED c/o redness of right leg and fever.  Patient reports that she was diagnosed with cellulitis 2 weeks ago, prescribed antibiotics, completed, leg continued to be red, increased redness and swelling the last several days, fever today.    On exam:  General: NAD.  Head: NCAT.  ENT: nares patent, no scleral icterus  Neck: Supple, trachea midline.  Cardiac: regular rate and rhythm.  Lungs: normal effort, clear to auscultation bilaterally  Abdomen: Soft, nondistended, NTTP, no rebound tenderness, no guarding or rigidity.   Extremities: Moves all extremities well, no peripheral edema  Neuro: alert, MAEW, follows commands  Psych: calm, cooperative  Skin: Warm, dry.  Erythema with warmth and induration of right anterior leg, no crepitus or subcutaneous emphysema, no pain beyond the border of erythema.    Medical Decision Making:  After the initial H&P, I discussed pertinent information from history and physical exam with patient/family.  Discussed differential diagnosis.  Discussed plan for ED evaluation/work-up/treatment.  All questions answered.  Patient/family is agreeable with  plan.    ED Course as of 07/28/24 0012   Sat Jul 27, 2024 1855 WBC(!): 18.02 [KA]   1855 Hemoglobin: 13.2 [KA]   1908 Lactate: 2.0 [KA]   1908 Glucose(!): 120 [KA]   1908 Creatinine: 0.75 [KA]   1913 The patient is failed outpatient treat with antibiotics.  Due to history of MRSA covered with Rocephin and vancomycin.  IV fluids ordered, Tylenol given for pain and fever.  She declined anything stronger but will let us know if that changes.  Recommended admission for inpatient treatment and she is agreeable. [KA]   1922 I discussed patient with Dr. Zavala, hospitalist, including history presentation workup and he agrees to admit for further evaluation and treatment. [KA]      ED Course User Index  [KA] Clara Thorne PA-C       Diagnosis  Final diagnoses:   Sepsis without acute organ dysfunction, due to unspecified organism   Cellulitis of right lower leg          Waldemar Shin MD  07/27/24 2002       Waldemar Shin MD  07/28/24 0012

## 2024-07-27 NOTE — ED PROVIDER NOTES
EMERGENCY DEPARTMENT ENCOUNTER  Room Number:  06/06  PCP: Tiffanie Woodard MD  Independent Historians: Patient      HPI:  Chief Complaint: had concerns including Leg Pain.       A complete HPI/ROS/PMH/PSH/SH/FH are unobtainable due to: None    Chronic or social conditions impacting patient care (Social Determinants of Health): None      Context: Brandy El is a 64 y.o. female with a medical history of cellulitis, MRSA, hyperlipidemia who presents to the ED c/o acute pain redness and swelling in the right lower leg associated with fever.  She states her symptoms started more than 2 weeks ago, she was seen at urgent care and prescribed a course of antibiotics.  It did improve, but did not fully resolve.  She completed the antibiotics 5 days ago.  It has gotten more red and swollen again and today she developed shaking chills and had a fever at home of 100.  Temp 102.2 upon arrival.  She denies nausea vomiting, is not diabetic.  Denies any upper respiratory symptoms or other complaints at this time.  She states she has recurrent cellulitis in this leg previously.      Review of prior external notes (non-ED) -and- Review of prior external test results outside of this encounter:  Urgent care evaluation On 7/15/2024 for right lower leg pain swelling and redness, also had had an exposure to strep.  Strep was negative, she was prescribed Keflex 500 mg 4 times daily x 7 days.        PAST MEDICAL HISTORY  Active Ambulatory Problems     Diagnosis Date Noted    Health care maintenance 06/14/2016    Hyperlipidemia 06/14/2016    History of MRSA infection 06/27/2017    Dyspepsia 10/17/2018    Change in bowel habits 10/17/2018    FH: esophageal cancer 10/17/2018    Anxiety 09/16/2022    Cellulitis of right lower extremity 06/22/2023     Resolved Ambulatory Problems     Diagnosis Date Noted    No Resolved Ambulatory Problems     Past Medical History:   Diagnosis Date    ASCUS of cervix with negative high risk HPV 09/2001    ASCUS  of cervix with negative high risk HPV 09/2005    Colon polyp 2013    Depression     Essential hematuria     Gait disturbance     Hypertension 2020    Irritable bowel syndrome many years ago    Postmenopausal     Vaginal delivery          PAST SURGICAL HISTORY  Past Surgical History:   Procedure Laterality Date    ANKLE SURGERY      BACK SURGERY      COLONOSCOPY  10/22/2013    tics, IH, HP    COLONOSCOPY N/A 12/03/2018    Procedure: COLONOSCOPY TO CECUM AND INTO TERMINAL ILEUM WITH COLD BIOPSIES AND COLD BIOPSY POLYPECTOMY;  Surgeon: Syd Emerson MD;  Location:  [facility];  Service: Gastroenterology    ENDOSCOPY N/A 12/03/2018    Procedure: ESOPHAGOGASTRODUODENOSCOPY WITH COLD BIOPSIES;  Surgeon: Syd Emerson MD;  Location:  [facility];  Service: Gastroenterology    LAPAROSCOPIC TUBAL LIGATION Bilateral     TEAR DUCT SURGERY  06/2017    TUBAL ABDOMINAL LIGATION           FAMILY HISTORY  Family History   Problem Relation Age of Onset    COPD Mother     Heart disease Father     Heart disease Brother     Cancer Brother     COPD Brother     Breast cancer Sister     No Known Problems Daughter     No Known Problems Son     No Known Problems Maternal Grandmother     No Known Problems Paternal Grandmother     No Known Problems Maternal Aunt     No Known Problems Paternal Aunt     BRCA 1/2 Neg Hx     Colon cancer Neg Hx     Endometrial cancer Neg Hx     Ovarian cancer Neg Hx          SOCIAL HISTORY  Social History     Socioeconomic History    Marital status:    Tobacco Use    Smoking status: Never    Smokeless tobacco: Never   Vaping Use    Vaping status: Never Used   Substance and Sexual Activity    Alcohol use: Not Currently     Alcohol/week: 2.0 standard drinks of alcohol     Types: 2 Shots of liquor per week     Comment: 2 x weekly    Drug use: No    Sexual activity: Not Currently     Partners: Male     Birth control/protection: Post-menopausal         ALLERGIES  Phenergan [promethazine  hcl]      REVIEW OF SYSTEMS  Review of Systems  Included in HPI  All systems reviewed and negative except for those discussed in HPI.      PHYSICAL EXAM    I have reviewed the triage vital signs and nursing notes.    ED Triage Vitals   Temp Heart Rate Resp BP SpO2   07/27/24 1820 07/27/24 1820 07/27/24 1820 07/27/24 1823 07/27/24 1820   (!) 102.2 °F (39 °C) 110 16 164/92 93 %      Temp src Heart Rate Source Patient Position BP Location FiO2 (%)   -- -- -- -- --              Physical Exam  GENERAL: alert, no acute distress  SKIN: Warm, dry  HENT: Normocephalic, atraumatic  EYES: no scleral icterus  CV: regular rhythm, regular rate  RESPIRATORY: normal effort, lungs clear  ABDOMEN: nondistended  MUSCULOSKELETAL: no deformity.  There is erythema edema and tenderness to the anterior aspect of the right lower leg.  DP pulses 2+, cap refill brisk, calf is nontender, no crepitus  NEURO: alert, moves all extremities, follows commands            LAB RESULTS  Recent Results (from the past 24 hour(s))   Comprehensive Metabolic Panel    Collection Time: 07/27/24  6:33 PM    Specimen: Blood   Result Value Ref Range    Glucose 120 (H) 65 - 99 mg/dL    BUN 17 8 - 23 mg/dL    Creatinine 0.75 0.57 - 1.00 mg/dL    Sodium 138 136 - 145 mmol/L    Potassium 3.8 3.5 - 5.2 mmol/L    Chloride 103 98 - 107 mmol/L    CO2 22.0 22.0 - 29.0 mmol/L    Calcium 8.9 8.6 - 10.5 mg/dL    Total Protein 7.3 6.0 - 8.5 g/dL    Albumin 4.3 3.5 - 5.2 g/dL    ALT (SGPT) 18 1 - 33 U/L    AST (SGOT) 16 1 - 32 U/L    Alkaline Phosphatase 67 39 - 117 U/L    Total Bilirubin 0.5 0.0 - 1.2 mg/dL    Globulin 3.0 gm/dL    A/G Ratio 1.4 g/dL    BUN/Creatinine Ratio 22.7 7.0 - 25.0    Anion Gap 13.0 5.0 - 15.0 mmol/L    eGFR 90.1 >60.0 mL/min/1.73   Lactic Acid, Plasma    Collection Time: 07/27/24  6:33 PM    Specimen: Blood   Result Value Ref Range    Lactate 2.0 0.5 - 2.0 mmol/L   CBC Auto Differential    Collection Time: 07/27/24  6:33 PM    Specimen: Blood    Result Value Ref Range    WBC 18.02 (H) 3.40 - 10.80 10*3/mm3    RBC 4.35 3.77 - 5.28 10*6/mm3    Hemoglobin 13.2 12.0 - 15.9 g/dL    Hematocrit 39.6 34.0 - 46.6 %    MCV 91.0 79.0 - 97.0 fL    MCH 30.3 26.6 - 33.0 pg    MCHC 33.3 31.5 - 35.7 g/dL    RDW 12.1 (L) 12.3 - 15.4 %    RDW-SD 40.2 37.0 - 54.0 fl    MPV 8.9 6.0 - 12.0 fL    Platelets 267 140 - 450 10*3/mm3    Neutrophil % 92.0 (H) 42.7 - 76.0 %    Lymphocyte % 4.3 (L) 19.6 - 45.3 %    Monocyte % 2.8 (L) 5.0 - 12.0 %    Eosinophil % 0.3 0.3 - 6.2 %    Basophil % 0.2 0.0 - 1.5 %    Immature Grans % 0.4 0.0 - 0.5 %    Neutrophils, Absolute 16.57 (H) 1.70 - 7.00 10*3/mm3    Lymphocytes, Absolute 0.78 0.70 - 3.10 10*3/mm3    Monocytes, Absolute 0.50 0.10 - 0.90 10*3/mm3    Eosinophils, Absolute 0.05 0.00 - 0.40 10*3/mm3    Basophils, Absolute 0.04 0.00 - 0.20 10*3/mm3    Immature Grans, Absolute 0.08 (H) 0.00 - 0.05 10*3/mm3    nRBC 0.0 0.0 - 0.2 /100 WBC   Green Top (Gel)    Collection Time: 07/27/24  6:33 PM   Result Value Ref Range    Extra Tube Hold for add-ons.    Lavender Top    Collection Time: 07/27/24  6:33 PM   Result Value Ref Range    Extra Tube hold for add-on    Gold Top - SST    Collection Time: 07/27/24  6:33 PM   Result Value Ref Range    Extra Tube Hold for add-ons.    Light Blue Top    Collection Time: 07/27/24  6:33 PM   Result Value Ref Range    Extra Tube Hold for add-ons.          RADIOLOGY  No Radiology Exams Resulted Within Past 24 Hours      MEDICATIONS GIVEN IN ER  Medications   lactated ringers bolus 1,000 mL (1,000 mL Intravenous New Bag 7/27/24 1948)   vancomycin IVPB 1500 mg in 0.9% NaCl (Premix) 500 mL (has no administration in time range)   cefTRIAXone (ROCEPHIN) 1,000 mg in sodium chloride 0.9 % 100 mL MBP (1,000 mg Intravenous New Bag 7/27/24 1945)   sodium chloride 0.9 % flush 10 mL (has no administration in time range)   sodium chloride 0.9 % flush 10 mL (has no administration in time range)   sodium chloride 0.9 %  infusion 40 mL (has no administration in time range)   nitroglycerin (NITROSTAT) SL tablet 0.4 mg (has no administration in time range)   Potassium Replacement - Follow Nurse / BPA Driven Protocol (has no administration in time range)   Magnesium Standard Dose Replacement - Follow Nurse / BPA Driven Protocol (has no administration in time range)   Phosphorus Replacement - Follow Nurse / BPA Driven Protocol (has no administration in time range)   Calcium Replacement - Follow Nurse / BPA Driven Protocol (has no administration in time range)   sennosides-docusate (PERICOLACE) 8.6-50 MG per tablet 2 tablet (has no administration in time range)     And   polyethylene glycol (MIRALAX) packet 17 g (has no administration in time range)     And   bisacodyl (DULCOLAX) EC tablet 5 mg (has no administration in time range)     And   bisacodyl (DULCOLAX) suppository 10 mg (has no administration in time range)   clonazePAM (KlonoPIN) tablet 0.5 mg (has no administration in time range)   acetaminophen (TYLENOL) tablet 1,000 mg (1,000 mg Oral Given 7/27/24 1938)   ondansetron (ZOFRAN) injection 4 mg (4 mg Intravenous Given 7/27/24 1944)         ORDERS PLACED DURING THIS VISIT:  Orders Placed This Encounter   Procedures    Blood Culture - Blood,    Blood Culture - Blood,    Comprehensive Metabolic Panel    [place] Draw    Lactic Acid, Plasma    CBC Auto Differential    Hemoglobin A1c    Basic Metabolic Panel    CBC (No Diff)    Magnesium    Phosphorus    Diet: Regular/House; Fluid Consistency: Thin (IDDSI 0)    Vital Signs    Notify Physician (With Default Parameters)    Up with assistance    Intake & Output    Weigh patient    Daily Weights    Oral Care    Maintain IV Access    Telemetry - Place Orders & Notify Provider of Results When Patient Experiences Acute Chest Pain, Dysrhythmia or Respiratory Distress    May Be Off Telemetry for Tests    Code Status and Medical Interventions: CPR (Attempt to Resuscitate); Full Support    [facility]  (on-call MD unless specified) Details    Incentive Spirometry    Insert Peripheral IV    Inpatient Admission    CBC & Differential    Green Top (Gel)    Lavender Top    Gold Top - SST    Light Blue Top         OUTPATIENT MEDICATION MANAGEMENT:  Current Facility-Administered Medications Ordered in Epic   Medication Dose Route Frequency Provider Last Rate Last Admin    sennosides-docusate (PERICOLACE) 8.6-50 MG per tablet 2 tablet  2 tablet Oral BID PRN Addis Zavala MD        And    polyethylene glycol (MIRALAX) packet 17 g  17 g Oral Daily PRN Addis Zavala MD        And    bisacodyl (DULCOLAX) EC tablet 5 mg  5 mg Oral Daily PRN Addis Zavala MD        And    bisacodyl (DULCOLAX) suppository 10 mg  10 mg Rectal Daily PRN Addis Zavala MD        Calcium Replacement - Follow Nurse / BPA Driven Protocol   Does not apply PRN Addis Zavala MD        cefTRIAXone (ROCEPHIN) 1,000 mg in sodium chloride 0.9 % 100 mL MBP  1,000 mg Intravenous Once Clara Thorne PA-C 200 mL/hr at 07/27/24 1945 1,000 mg at 07/27/24 1945    clonazePAM (KlonoPIN) tablet 0.5 mg  0.5 mg Oral BID PRN Addis Zavala MD        lactated ringers bolus 1,000 mL  1,000 mL Intravenous Once Clara Thorne PA-C 2,000 mL/hr at 07/27/24 1948 1,000 mL at 07/27/24 1948    Magnesium Standard Dose Replacement - Follow Nurse / BPA Driven Protocol   Does not apply PRN Addis Zavala MD        nitroglycerin (NITROSTAT) SL tablet 0.4 mg  0.4 mg Sublingual Q5 Min PRN Addis Zavala MD        Phosphorus Replacement - Follow Nurse / BPA Driven Protocol   Does not apply PRN Addis Zavala MD        Potassium Replacement - Follow Nurse / BPA Driven Protocol   Does not apply PRN Addis Zavala MD        sodium chloride 0.9 % flush 10 mL  10 mL Intravenous Q12H Addis Zavala MD        sodium chloride 0.9 % flush 10 mL  10 mL Intravenous PRN Addis Zavala MD        sodium chloride 0.9 %  infusion 40 mL  40 mL Intravenous PRN Addis Zavala MD        vancomycin IVPB 1500 mg in 0.9% NaCl (Premix) 500 mL  20 mg/kg Intravenous Once Clara Thorne PA-C         Current Outpatient Medications Ordered in Epic   Medication Sig Dispense Refill    amLODIPine (NORVASC) 2.5 MG tablet Take 1 tablet by mouth Daily. 90 tablet 1    atorvastatin (LIPITOR) 20 MG tablet TAKE 1 TABLET BY MOUTH DAILY 90 tablet 1    buPROPion XL (WELLBUTRIN XL) 150 MG 24 hr tablet Take 3 tablets by mouth Every Morning.      buPROPion XL (WELLBUTRIN XL) 300 MG 24 hr tablet       cetirizine (zyrTEC) 10 MG tablet Take 1 tablet by mouth Daily for 30 days. 30 tablet 0    clonazePAM (KlonoPIN) 0.5 MG tablet Take 1 tablet by mouth 2 (Two) Times a Day As Needed for Anxiety. 30 tablet 2    clonazePAM (KlonoPIN) 1 MG tablet       escitalopram (LEXAPRO) 10 MG tablet       fluticasone (FLONASE) 50 MCG/ACT nasal spray 2 sprays into the nostril(s) as directed by provider Daily. 16 g 3    omega-3 acid ethyl esters (LOVAZA) 1 g capsule TAKE 2 CAPSULES BY MOUTH TWICE A  capsule 3    propranolol LA (INDERAL LA) 60 MG 24 hr capsule TAKE 1 CAPSULE BY MOUTH DAILY 30 capsule 4    traZODone (DESYREL) 50 MG tablet       valsartan (DIOVAN) 320 MG tablet TAKE ONE TABLET BY MOUTH DAILY 90 tablet 3         PROCEDURES  Procedures            PROGRESS, DATA ANALYSIS, CONSULTS, AND MEDICAL DECISION MAKING  All labs have been independently interpreted by me.  All radiology studies have been reviewed by me. All EKG's have been independently viewed and interpreted by me.  Discussion below represents my analysis of pertinent findings related to patient's condition, differential diagnosis, treatment plan and final disposition.    DIFFERENTIAL  Differential diagnosis includes was not limited to sepsis, cellulitis, DVT, erysipelas    Clinical Scores:                  ED Course as of 07/27/24 2004   Sat Jul 27, 2024   1855 WBC(!): 18.02 [KA]   1855 Hemoglobin:  13.2 [KA]   1908 Lactate: 2.0 [KA]   1908 Glucose(!): 120 [KA]   1908 Creatinine: 0.75 [KA]   1913 The patient is failed outpatient treat with antibiotics.  Due to history of MRSA covered with Rocephin and vancomycin.  IV fluids ordered, Tylenol given for pain and fever.  She declined anything stronger but will let us know if that changes.  Recommended admission for inpatient treatment and she is agreeable. [KA]   1922 I discussed patient with Dr. Bernstein, hospitalist, including history presentation workup and he agrees to admit for further evaluation and treatment. [KA]      ED Course User Index  [KA] Clara Thorne PA-C             AS OF 20:04 EDT VITALS:    BP - 146/85  HR - 97  TEMP - (!) 102.2 °F (39 °C)  O2 SATS - 96%    COMPLEXITY OF CARE  The patient requires admission.      DIAGNOSIS  Final diagnoses:   Sepsis without acute organ dysfunction, due to unspecified organism   Cellulitis of right lower leg         DISPOSITION  ED Disposition       ED Disposition   Decision to Admit    Condition   --    Comment   Level of Care: Telemetry [5]   Diagnosis: Sepsis [[id number]]   Admitting Physician: MARION BERNSTEIN [[id number]]   Attending Physician: MARION BERNSTEIN [[id number]]   Certification: I Certify That Inpatient Hospital Services Are Medically Necessary For Greater Than 2 Midnights                    FOLLOW UP  No follow-up provider specified.      Prescribed Medications     Medication List      No changes were made to your prescriptions during this visit.                   Please note that portions of this document were completed with a voice recognition program.    Note Disclaimer: At [facility], we believe that sharing information builds trust and better relationships. You are receiving this note because you recently visited [facility]. It is possible you will see health information before a provider has talked with you about it. This kind of information can be easy to misunderstand. To help you  fully understand what it means for your health, we urge you to discuss this note with your provider.         Clara Thorne PA-C  07/27/24 2005

## 2024-07-27 NOTE — H&P
Patient Name:  Brandy El  YOB: 1960  MRN:  [id number]  Admit Date:  7/27/2024  Patient Care Team:  Tiffanie Woodard MD as PCP - General  Tiffanie Woodard MD as PCP - Family Medicine      Subjective   History Present Illness     Chief Complaint   Patient presents with    Leg Pain         History of Present Illness  Patient is a 64-year-old female with a history of hypertension, hyperlipidemia, anxiety depression, MRSA infection, history of right lower extremity cellulitis, presented to the ED complaining of pain, worsening redness and swelling and pain in right lower extremity.  Patient reports symptoms noted.  Approximately 2 weeks ago, she was seen at urgent care, was diagnosed with cellulitis, and prescribed Keflex for 7 days.  She did not improvement of antibiotics however symptoms did not completely resolve, and she continued to have redness.  Noticed worsening swelling, pain, erythema today and developed shaking chills and fever up to 100 F and decided to present to the ED.  Denies any known injury.  Did have right lower extremity cellulitis approximately 1 year ago.  Does have history of MRSA infection in her pthumb    Review of Systems     GENERAL: + fevers, chills, sweats.    RESPIRATORY: No cough, shortness of breath, hemoptysis or wheezing.   CVS: No chest pain, palpitations, orthopnea, dyspnea on exertion   GI: No melena or hematochezia. No abdominal pain. No nausea, vomiting, constipation, diarrhea  Extremities:  +Right lower extremity pain,swelling, redness      Personal History     Past Medical History:   Diagnosis Date    Anxiety     ASCUS of cervix with negative high risk HPV 09/2001    ASCUS of cervix with negative high risk HPV 09/2005    Colon polyp 2013    Depression     Essential hematuria     Gait disturbance     R foot drop    Health care maintenance 06/14/2016    Hyperlipidemia     Hypertension 2020    Irritable bowel syndrome many years ago    Postmenopausal     Vaginal  delivery      Past Surgical History:   Procedure Laterality Date    ANKLE SURGERY      BACK SURGERY      COLONOSCOPY  10/22/2013    tics, IH, HP    COLONOSCOPY N/A 12/03/2018    Procedure: COLONOSCOPY TO CECUM AND INTO TERMINAL ILEUM WITH COLD BIOPSIES AND COLD BIOPSY POLYPECTOMY;  Surgeon: Syd Emerson MD;  Location: [facility] ENDOSCOPY;  Service: Gastroenterology    ENDOSCOPY N/A 12/03/2018    Procedure: ESOPHAGOGASTRODUODENOSCOPY WITH COLD BIOPSIES;  Surgeon: Syd Emerson MD;  Location: [facility] ENDOSCOPY;  Service: Gastroenterology    LAPAROSCOPIC TUBAL LIGATION Bilateral     TEAR DUCT SURGERY  06/2017    TUBAL ABDOMINAL LIGATION       Family History   Problem Relation Age of Onset    COPD Mother     Heart disease Father     Heart disease Brother     Cancer Brother     COPD Brother     Breast cancer Sister     No Known Problems Daughter     No Known Problems Son     No Known Problems Maternal Grandmother     No Known Problems Paternal Grandmother     No Known Problems Maternal Aunt     No Known Problems Paternal Aunt     BRCA 1/2 Neg Hx     Colon cancer Neg Hx     Endometrial cancer Neg Hx     Ovarian cancer Neg Hx      Social History     Tobacco Use    Smoking status: Never    Smokeless tobacco: Never   Vaping Use    Vaping status: Never Used   Substance Use Topics    Alcohol use: Not Currently     Alcohol/week: 2.0 standard drinks of alcohol     Types: 2 Shots of liquor per week     Comment: 2 x weekly    Drug use: No     No current facility-administered medications on file prior to encounter.     Current Outpatient Medications on File Prior to Encounter   Medication Sig Dispense Refill    amLODIPine (NORVASC) 2.5 MG tablet Take 1 tablet by mouth Daily. 90 tablet 1    atorvastatin (LIPITOR) 20 MG tablet TAKE 1 TABLET BY MOUTH DAILY 90 tablet 1    buPROPion XL (WELLBUTRIN XL) 150 MG 24 hr tablet Take 3 tablets by mouth Every Morning.      buPROPion XL (WELLBUTRIN XL) 300 MG 24 hr tablet       cetirizine (zyrTEC)  10 MG tablet Take 1 tablet by mouth Daily for 30 days. 30 tablet 0    clonazePAM (KlonoPIN) 0.5 MG tablet Take 1 tablet by mouth 2 (Two) Times a Day As Needed for Anxiety. 30 tablet 2    clonazePAM (KlonoPIN) 1 MG tablet       escitalopram (LEXAPRO) 10 MG tablet       fluticasone (FLONASE) 50 MCG/ACT nasal spray 2 sprays into the nostril(s) as directed by provider Daily. 16 g 3    omega-3 acid ethyl esters (LOVAZA) 1 g capsule TAKE 2 CAPSULES BY MOUTH TWICE A  capsule 3    propranolol LA (INDERAL LA) 60 MG 24 hr capsule TAKE 1 CAPSULE BY MOUTH DAILY 30 capsule 4    traZODone (DESYREL) 50 MG tablet       valsartan (DIOVAN) 320 MG tablet TAKE ONE TABLET BY MOUTH DAILY 90 tablet 3     Allergies   Allergen Reactions    Phenergan [Promethazine Hcl] Anxiety       Objective    Objective     Vital Signs  Temp:  [102.2 °F (39 °C)] 102.2 °F (39 °C)  Heart Rate:  [110] 110  Resp:  [16] 16  BP: (164)/(92) 164/92  SpO2:  [93 %] 93 %  on   ;      Body mass index is 30.11 kg/m².    Physical Exam  General: Alert and oriented x3, no acute distress.  HEENT: Normocephalic, atraumatic  Eyes: PERRL, EOMI, anicteric sclerae  Lungs: Clear to auscultation bilaterally, no crackles or wheezes  CV: Regular rate and rhythm, no murmurs rubs or gallops  Abdomen: Soft, nontender, nondistended.  Normoactive bowel sounds  Extremities: Right lower extremity edema, +erythema below knee primarily in anterior and medial leg,, tenderness to palpation.  No significant left lower extremity edema.  Skin: Clean/dry/intact, no rashes  Neuro: Cranial nerves II through XII intact, no gross focal neurological deficits appreciated  Psych: Appropriate mood and affect    Results Review:  I reviewed the patient's new clinical results.  I reviewed the patient's new imaging results and agree with the interpretation.  I reviewed the patient's other test results and agree with the interpretation  I personally viewed and interpreted the patient's EKG/Telemetry  data  Discussed with ED provider.    Lab Results (last 24 hours)       Procedure Component Value Units Date/Time    CBC & Differential [[id number]]  (Abnormal) Collected: 07/27/24 1833    Specimen: Blood Updated: 07/27/24 1845    Narrative:      The following orders were created for panel order CBC & Differential.  Procedure                               Abnormality         Status                     ---------                               -----------         ------                     CBC Auto Differential[[id number]]        Abnormal            Final result                 Please view results for these tests on the individual orders.    Comprehensive Metabolic Panel [[id number]]  (Abnormal) Collected: 07/27/24 1833    Specimen: Blood Updated: 07/27/24 1905     Glucose 120 mg/dL      BUN 17 mg/dL      Creatinine 0.75 mg/dL      Sodium 138 mmol/L      Potassium 3.8 mmol/L      Chloride 103 mmol/L      CO2 22.0 mmol/L      Calcium 8.9 mg/dL      Total Protein 7.3 g/dL      Albumin 4.3 g/dL      ALT (SGPT) 18 U/L      AST (SGOT) 16 U/L      Alkaline Phosphatase 67 U/L      Total Bilirubin 0.5 mg/dL      Globulin 3.0 gm/dL      A/G Ratio 1.4 g/dL      BUN/Creatinine Ratio 22.7     Anion Gap 13.0 mmol/L      eGFR 90.1 mL/min/1.73     Narrative:      GFR Normal >60  Chronic Kidney Disease <60  Kidney Failure <15      Lactic Acid, Plasma [[id number]]  (Normal) Collected: 07/27/24 1833    Specimen: Blood Updated: 07/27/24 1859     Lactate 2.0 mmol/L     CBC Auto Differential [[id number]]  (Abnormal) Collected: 07/27/24 1833    Specimen: Blood Updated: 07/27/24 1845     WBC 18.02 10*3/mm3      RBC 4.35 10*6/mm3      Hemoglobin 13.2 g/dL      Hematocrit 39.6 %      MCV 91.0 fL      MCH 30.3 pg      MCHC 33.3 g/dL      RDW 12.1 %      RDW-SD 40.2 fl      MPV 8.9 fL      Platelets 267 10*3/mm3      Neutrophil % 92.0 %      Lymphocyte % 4.3 %      Monocyte % 2.8 %      Eosinophil % 0.3 %      Basophil % 0.2 %      Immature Grans %  0.4 %      Neutrophils, Absolute 16.57 10*3/mm3      Lymphocytes, Absolute 0.78 10*3/mm3      Monocytes, Absolute 0.50 10*3/mm3      Eosinophils, Absolute 0.05 10*3/mm3      Basophils, Absolute 0.04 10*3/mm3      Immature Grans, Absolute 0.08 10*3/mm3      nRBC 0.0 /100 WBC     Blood Culture - Blood, Arm, Left [[id number]] Collected: 07/27/24 1931    Specimen: Blood from Arm, Left Updated: 07/27/24 1937    Blood Culture - Blood, Arm, Right [[id number]] Collected: 07/27/24 1931    Specimen: Blood from Arm, Right Updated: 07/27/24 1937            Imaging Results (Last 24 Hours)       ** No results found for the last 24 hours. **            Results for orders placed in visit on 06/16/15    SCANNED - ECHOCARDIOGRAM      No orders to display        Assessment/Plan     Active Hospital Problems    Diagnosis  POA    **Sepsis [A41.9]  Yes    Cellulitis of right lower extremity [L03.115]  Yes    History of MRSA infection [Z86.14]  Yes    Hyperlipidemia [E78.5]  Yes      Resolved Hospital Problems   No resolved problems to display.   Patient is a 64-year-old female with a history of hypertension, hyperlipidemia, anxiety depression, MRSA infection, history of right lower extremity cellulitis, presented to the ED complaining of pain, worsening redness and swelling and pain in right lower extremity        Sepsis secondary to right lower extremity cellulitis  -Initially diagnosed with right lower send cellulitis approximately 2 weeks ago, was initiated on Keflex for 7 days, did have improvement of antibiotics however erythema has not completely resolved per patient.  Symptoms worsened prior to admission.  -WBC elevated at 18.02, patient febrile up to 102.2 on admission, tachycardic  -Received IV ceftriaxone and vancomycin in the ED, continue  -Blood cultures pending  -Hemodynamics stable, BP on the higher side  -Duplex ultrasound of right lower extremity ordered  -Pain management  -IV fluids bolus given in the ED, initiated on  normal saline 100 cc an hour      Essential hypertension  -Patient with sepsis as above, however BP stable, on the higher side  -Resume propranolol, on propranolol ER 60 mg 24 hours outpatient, initiated on 30 mg every 12 hours inpatient starting tomorrow   -Patient amlodipine 2.5 mg daily starting tomorrow  -Hold valsartan for now in the setting of sepsis, monitor BP and resume as indicated.      Anxiety/depression  -Resume bupropion, citalopram, as needed clonazepam      Hyperglycemia  -Hemoglobin A1c ordered        I discussed the patient's findings and my recommendations with patient, nursing staff, and ED provider.    VTE Prophylaxis - Lovenox 40 mg SC daily.  Code Status - Full code.       Addis Zavala MD  [facility]  07/27/24  19:43 EDT

## 2024-07-27 NOTE — ED NOTES
Pt c/o right lower leg redness, swelling and pain that started 2wks ago. Pt had round of abx with some improvement. Reports fever, chills today. Pt has not treated fever yet today

## 2024-07-28 ENCOUNTER — APPOINTMENT (OUTPATIENT)
Dept: CARDIOLOGY | Facility: HOSPITAL | Age: 64
End: 2024-07-28
Payer: COMMERCIAL

## 2024-07-28 LAB
ANION GAP SERPL CALCULATED.3IONS-SCNC: 7 MMOL/L (ref 5–15)
BH CV LOWER VASCULAR LEFT COMMON FEMORAL AUGMENT: NORMAL
BH CV LOWER VASCULAR LEFT COMMON FEMORAL COMPETENT: NORMAL
BH CV LOWER VASCULAR LEFT COMMON FEMORAL COMPRESS: NORMAL
BH CV LOWER VASCULAR LEFT COMMON FEMORAL PHASIC: NORMAL
BH CV LOWER VASCULAR LEFT COMMON FEMORAL SPONT: NORMAL
BH CV LOWER VASCULAR RIGHT COMMON FEMORAL AUGMENT: NORMAL
BH CV LOWER VASCULAR RIGHT COMMON FEMORAL COMPETENT: NORMAL
BH CV LOWER VASCULAR RIGHT COMMON FEMORAL COMPRESS: NORMAL
BH CV LOWER VASCULAR RIGHT COMMON FEMORAL PHASIC: NORMAL
BH CV LOWER VASCULAR RIGHT COMMON FEMORAL SPONT: NORMAL
BH CV LOWER VASCULAR RIGHT DISTAL FEMORAL COMPRESS: NORMAL
BH CV LOWER VASCULAR RIGHT GASTRONEMIUS COMPRESS: NORMAL
BH CV LOWER VASCULAR RIGHT GREATER SAPH AK COMPRESS: NORMAL
BH CV LOWER VASCULAR RIGHT GREATER SAPH BK COMPRESS: NORMAL
BH CV LOWER VASCULAR RIGHT LESSER SAPH COMPRESS: NORMAL
BH CV LOWER VASCULAR RIGHT MID FEMORAL AUGMENT: NORMAL
BH CV LOWER VASCULAR RIGHT MID FEMORAL COMPETENT: NORMAL
BH CV LOWER VASCULAR RIGHT MID FEMORAL COMPRESS: NORMAL
BH CV LOWER VASCULAR RIGHT MID FEMORAL PHASIC: NORMAL
BH CV LOWER VASCULAR RIGHT MID FEMORAL SPONT: NORMAL
BH CV LOWER VASCULAR RIGHT PERONEAL COMPRESS: NORMAL
BH CV LOWER VASCULAR RIGHT POPLITEAL AUGMENT: NORMAL
BH CV LOWER VASCULAR RIGHT POPLITEAL COMPETENT: NORMAL
BH CV LOWER VASCULAR RIGHT POPLITEAL COMPRESS: NORMAL
BH CV LOWER VASCULAR RIGHT POPLITEAL PHASIC: NORMAL
BH CV LOWER VASCULAR RIGHT POPLITEAL SPONT: NORMAL
BH CV LOWER VASCULAR RIGHT POSTERIOR TIBIAL COMPRESS: NORMAL
BH CV LOWER VASCULAR RIGHT PROFUNDA FEMORAL COMPRESS: NORMAL
BH CV LOWER VASCULAR RIGHT PROXIMAL FEMORAL COMPRESS: NORMAL
BH CV LOWER VASCULAR RIGHT SAPHENOFEMORAL JUNCTION COMPRESS: NORMAL
BUN SERPL-MCNC: 9 MG/DL (ref 8–23)
BUN/CREAT SERPL: 13.4 (ref 7–25)
CALCIUM SPEC-SCNC: 8 MG/DL (ref 8.6–10.5)
CHLORIDE SERPL-SCNC: 105 MMOL/L (ref 98–107)
CO2 SERPL-SCNC: 24 MMOL/L (ref 22–29)
CREAT SERPL-MCNC: 0.67 MG/DL (ref 0.57–1)
DEPRECATED RDW RBC AUTO: 41.2 FL (ref 37–54)
EGFRCR SERPLBLD CKD-EPI 2021: 99 ML/MIN/1.73
ERYTHROCYTE [DISTWIDTH] IN BLOOD BY AUTOMATED COUNT: 12.4 % (ref 12.3–15.4)
GLUCOSE SERPL-MCNC: 118 MG/DL (ref 65–99)
HBA1C MFR BLD: 5.1 % (ref 4.8–5.6)
HCT VFR BLD AUTO: 33.2 % (ref 34–46.6)
HGB BLD-MCNC: 10.8 G/DL (ref 12–15.9)
MAGNESIUM SERPL-MCNC: 1.9 MG/DL (ref 1.6–2.4)
MCH RBC QN AUTO: 29.8 PG (ref 26.6–33)
MCHC RBC AUTO-ENTMCNC: 32.5 G/DL (ref 31.5–35.7)
MCV RBC AUTO: 91.5 FL (ref 79–97)
MRSA DNA SPEC QL NAA+PROBE: NORMAL
PHOSPHATE SERPL-MCNC: 2.1 MG/DL (ref 2.5–4.5)
PHOSPHATE SERPL-MCNC: 2.2 MG/DL (ref 2.5–4.5)
PLATELET # BLD AUTO: 194 10*3/MM3 (ref 140–450)
PMV BLD AUTO: 9 FL (ref 6–12)
POTASSIUM SERPL-SCNC: 3.9 MMOL/L (ref 3.5–5.2)
RBC # BLD AUTO: 3.63 10*6/MM3 (ref 3.77–5.28)
SODIUM SERPL-SCNC: 136 MMOL/L (ref 136–145)
WBC NRBC COR # BLD AUTO: 12.72 10*3/MM3 (ref 3.4–10.8)

## 2024-07-28 PROCEDURE — 80048 BASIC METABOLIC PNL TOTAL CA: CPT | Performed by: STUDENT IN AN ORGANIZED HEALTH CARE EDUCATION/TRAINING PROGRAM

## 2024-07-28 PROCEDURE — 83735 ASSAY OF MAGNESIUM: CPT | Performed by: STUDENT IN AN ORGANIZED HEALTH CARE EDUCATION/TRAINING PROGRAM

## 2024-07-28 PROCEDURE — 84100 ASSAY OF PHOSPHORUS: CPT | Performed by: STUDENT IN AN ORGANIZED HEALTH CARE EDUCATION/TRAINING PROGRAM

## 2024-07-28 PROCEDURE — 25010000002 CEFTRIAXONE PER 250 MG: Performed by: STUDENT IN AN ORGANIZED HEALTH CARE EDUCATION/TRAINING PROGRAM

## 2024-07-28 PROCEDURE — 83036 HEMOGLOBIN GLYCOSYLATED A1C: CPT | Performed by: STUDENT IN AN ORGANIZED HEALTH CARE EDUCATION/TRAINING PROGRAM

## 2024-07-28 PROCEDURE — 93971 EXTREMITY STUDY: CPT | Performed by: STUDENT IN AN ORGANIZED HEALTH CARE EDUCATION/TRAINING PROGRAM

## 2024-07-28 PROCEDURE — 25010000002 VANCOMYCIN 1 G RECONSTITUTED SOLUTION 1 EACH VIAL: Performed by: STUDENT IN AN ORGANIZED HEALTH CARE EDUCATION/TRAINING PROGRAM

## 2024-07-28 PROCEDURE — 85027 COMPLETE CBC AUTOMATED: CPT | Performed by: STUDENT IN AN ORGANIZED HEALTH CARE EDUCATION/TRAINING PROGRAM

## 2024-07-28 PROCEDURE — 93971 EXTREMITY STUDY: CPT

## 2024-07-28 PROCEDURE — 25010000002 ENOXAPARIN PER 10 MG: Performed by: STUDENT IN AN ORGANIZED HEALTH CARE EDUCATION/TRAINING PROGRAM

## 2024-07-28 PROCEDURE — 25810000003 SODIUM CHLORIDE 0.9 % SOLUTION 250 ML FLEX CONT: Performed by: STUDENT IN AN ORGANIZED HEALTH CARE EDUCATION/TRAINING PROGRAM

## 2024-07-28 PROCEDURE — 87641 MR-STAPH DNA AMP PROBE: CPT | Performed by: HOSPITALIST

## 2024-07-28 PROCEDURE — 25810000003 SODIUM CHLORIDE 0.9 % SOLUTION: Performed by: STUDENT IN AN ORGANIZED HEALTH CARE EDUCATION/TRAINING PROGRAM

## 2024-07-28 PROCEDURE — 25010000002 ONDANSETRON PER 1 MG: Performed by: HOSPITALIST

## 2024-07-28 PROCEDURE — 84100 ASSAY OF PHOSPHORUS: CPT | Performed by: HOSPITALIST

## 2024-07-28 PROCEDURE — 36415 COLL VENOUS BLD VENIPUNCTURE: CPT | Performed by: STUDENT IN AN ORGANIZED HEALTH CARE EDUCATION/TRAINING PROGRAM

## 2024-07-28 RX ORDER — ACETAMINOPHEN 500 MG
1000 TABLET ORAL EVERY 6 HOURS PRN
Status: DISCONTINUED | OUTPATIENT
Start: 2024-07-28 | End: 2024-07-31 | Stop reason: HOSPADM

## 2024-07-28 RX ORDER — ONDANSETRON 2 MG/ML
4 INJECTION INTRAMUSCULAR; INTRAVENOUS EVERY 6 HOURS PRN
Status: DISCONTINUED | OUTPATIENT
Start: 2024-07-28 | End: 2024-07-31 | Stop reason: HOSPADM

## 2024-07-28 RX ADMIN — SODIUM CHLORIDE 1000 MG: 9 INJECTION, SOLUTION INTRAVENOUS at 20:16

## 2024-07-28 RX ADMIN — ACETAMINOPHEN 1000 MG: 500 TABLET ORAL at 00:03

## 2024-07-28 RX ADMIN — ENOXAPARIN SODIUM 40 MG: 100 INJECTION SUBCUTANEOUS at 20:16

## 2024-07-28 RX ADMIN — ACETAMINOPHEN 1000 MG: 500 TABLET ORAL at 08:37

## 2024-07-28 RX ADMIN — ACETAMINOPHEN 1000 MG: 500 TABLET ORAL at 17:53

## 2024-07-28 RX ADMIN — Medication 10 ML: at 20:17

## 2024-07-28 RX ADMIN — SODIUM CHLORIDE 100 ML/HR: 9 INJECTION, SOLUTION INTRAVENOUS at 19:43

## 2024-07-28 RX ADMIN — BUPROPION HYDROCHLORIDE 450 MG: 300 TABLET, EXTENDED RELEASE ORAL at 08:38

## 2024-07-28 RX ADMIN — ESCITALOPRAM OXALATE 10 MG: 10 TABLET, FILM COATED ORAL at 21:21

## 2024-07-28 RX ADMIN — PROPRANOLOL HYDROCHLORIDE 30 MG: 20 TABLET ORAL at 08:38

## 2024-07-28 RX ADMIN — ONDANSETRON 4 MG: 2 INJECTION INTRAMUSCULAR; INTRAVENOUS at 15:22

## 2024-07-28 RX ADMIN — SODIUM CHLORIDE 1000 MG: 9 INJECTION, SOLUTION INTRAVENOUS at 08:39

## 2024-07-28 RX ADMIN — POTASSIUM, SODIUM PHOSPHATES 280 MG-160 MG-250 MG ORAL POWDER PACKET 2 PACKET: POWDER IN PACKET at 10:03

## 2024-07-28 RX ADMIN — Medication 10 ML: at 08:44

## 2024-07-28 RX ADMIN — ESCITALOPRAM OXALATE 10 MG: 10 TABLET, FILM COATED ORAL at 00:59

## 2024-07-28 RX ADMIN — POTASSIUM, SODIUM PHOSPHATES 280 MG-160 MG-250 MG ORAL POWDER PACKET 2 PACKET: POWDER IN PACKET at 21:20

## 2024-07-28 RX ADMIN — CEFTRIAXONE 2000 MG: 2 INJECTION, POWDER, FOR SOLUTION INTRAMUSCULAR; INTRAVENOUS at 10:03

## 2024-07-28 RX ADMIN — ONDANSETRON 4 MG: 2 INJECTION INTRAMUSCULAR; INTRAVENOUS at 21:21

## 2024-07-28 RX ADMIN — ATORVASTATIN CALCIUM 20 MG: 20 TABLET, FILM COATED ORAL at 08:39

## 2024-07-28 RX ADMIN — PROPRANOLOL HYDROCHLORIDE 30 MG: 20 TABLET ORAL at 21:21

## 2024-07-28 NOTE — PROGRESS NOTES
"[facility] Clinical Pharmacy Services: Enoxaparin Consult    Brandy El has a pharmacy consult to dose prophylactic enoxaparin per Dr. Zavala's request.     Indication: VTE Prophylaxis  Home Anticoagulation: N/A     Relevant clinical data and objective history reviewed:  64 y.o. female 160 cm (63\") 77.1 kg (170 lb)   Body mass index is 30.11 kg/m².   Results from last 7 days   Lab Units 07/27/24  1833   PLATELETS 10*3/mm3 267     Estimated Creatinine Clearance: 76.5 mL/min (by C-G formula based on SCr of 0.75 mg/dL).    Assessment/Plan    Will start patient on 40mg subcutaneous every 24 hours, adjusted for renal function. Consult order will be discontinued but pharmacy will continue to follow.     Nick Irwin III, [facility]  Clinical Pharmacist    "

## 2024-07-28 NOTE — PLAN OF CARE
Problem: Adult Inpatient Plan of Care  Goal: Plan of Care Review  Outcome: Ongoing, Progressing  Flowsheets (Taken 7/28/2024 0631)  Progress: no change  Plan of Care Reviewed With: patient  Outcome Evaluation: Pt A&Ox4 on RA. PRN tylenol admin to treat fever. NS infusing at 100 ml/hr. Meds admin as ordered. Admission documentation and assessment completed, plan of care ongoing.  Goal: Patient-Specific Goal (Individualized)  Outcome: Ongoing, Progressing  Goal: Absence of Hospital-Acquired Illness or Injury  Outcome: Ongoing, Progressing  Intervention: Identify and Manage Fall Risk  Recent Flowsheet Documentation  Taken 7/28/2024 0629 by Jaclyn Cabrera RN  Safety Promotion/Fall Prevention: safety round/check completed  Taken 7/28/2024 0426 by Jaclyn Cabrera RN  Safety Promotion/Fall Prevention: safety round/check completed  Taken 7/28/2024 0238 by Jaclyn Cabrera RN  Safety Promotion/Fall Prevention: safety round/check completed  Taken 7/28/2024 0040 by Jaclyn Cabrera RN  Safety Promotion/Fall Prevention:   activity supervised   assistive device/personal items within reach   clutter free environment maintained   fall prevention program maintained   lighting adjusted   nonskid shoes/slippers when out of bed   room organization consistent   safety round/check completed  Taken 7/27/2024 2209 by Jaclyn Cabrera RN  Safety Promotion/Fall Prevention: safety round/check completed  Taken 7/27/2024 2105 by Jaclyn Cabrera RN  Safety Promotion/Fall Prevention:   activity supervised   assistive device/personal items within reach   clutter free environment maintained   fall prevention program maintained   lighting adjusted   nonskid shoes/slippers when out of bed   room organization consistent   safety round/check completed  Intervention: Prevent Skin Injury  Recent Flowsheet Documentation  Taken 7/28/2024 0629 by Jaclyn Cabrera RN  Body Position: position changed independently  Taken 7/28/2024 0426 by Jaclyn Cabrera RN  Body Position:  position changed independently  Taken 7/28/2024 0238 by Jaclyn Cabrera RN  Body Position: position changed independently  Taken 7/28/2024 0040 by Jaclyn Cabrera RN  Body Position: position changed independently  Taken 7/27/2024 2209 by Jaclyn Cabrera RN  Body Position: position changed independently  Taken 7/27/2024 2105 by Jaclyn Cabrera RN  Body Position: position changed independently  Intervention: Prevent and Manage VTE (Venous Thromboembolism) Risk  Recent Flowsheet Documentation  Taken 7/28/2024 0040 by Jaclyn Cabrera RN  Range of Motion: active ROM (range of motion) encouraged  Taken 7/27/2024 2105 by Jaclyn Cabrera RN  VTE Prevention/Management: (Lovenox) other (see comments)  Range of Motion: active ROM (range of motion) encouraged  Intervention: Prevent Infection  Recent Flowsheet Documentation  Taken 7/28/2024 0040 by Jaclyn Cabrera RN  Infection Prevention:   environmental surveillance performed   single patient room provided   rest/sleep promoted  Taken 7/27/2024 2105 by Jaclyn Cabrera RN  Infection Prevention:   environmental surveillance performed   rest/sleep promoted   single patient room provided  Goal: Optimal Comfort and Wellbeing  Outcome: Ongoing, Progressing  Intervention: Provide Person-Centered Care  Recent Flowsheet Documentation  Taken 7/28/2024 0040 by Jaclyn Cabrera RN  Trust Relationship/Rapport:   care explained   choices provided  Taken 7/27/2024 2105 by Jaclyn Cabrera RN  Trust Relationship/Rapport:   care explained   choices provided   questions answered   reassurance provided   thoughts/feelings acknowledged  Goal: Readiness for Transition of Care  Outcome: Ongoing, Progressing  Intervention: Mutually Develop Transition Plan  Recent Flowsheet Documentation  Taken 7/27/2024 2106 by Jaclyn Cabrera RN  Equipment Currently Used at Home: none  Transportation Anticipated: car, drives self  Patient/Family Anticipated Services at Transition: none  Patient/Family Anticipates Transition to: home      Problem: Behavioral Health Comorbidity  Goal: Maintenance of Behavioral Health Symptom Control  Outcome: Ongoing, Progressing  Intervention: Maintain Behavioral Health Symptom Control  Recent Flowsheet Documentation  Taken 7/28/2024 0629 by Jaclyn Cabrera RN  Medication Review/Management: medications reviewed  Taken 7/28/2024 0426 by Jaclyn Cabrera RN  Medication Review/Management: medications reviewed  Taken 7/28/2024 0238 by Jaclyn Cabrera RN  Medication Review/Management: medications reviewed  Taken 7/28/2024 0040 by Jaclyn Cabrera RN  Medication Review/Management: medications reviewed  Taken 7/27/2024 2209 by Jaclyn Cabrera RN  Medication Review/Management: medications reviewed  Taken 7/27/2024 2105 by Jaclyn Cabrera RN  Medication Review/Management: medications reviewed     Problem: Hypertension Comorbidity  Goal: Blood Pressure in Desired Range  Outcome: Ongoing, Progressing  Intervention: Maintain Blood Pressure Management  Recent Flowsheet Documentation  Taken 7/28/2024 0629 by Jaclyn Cabrera RN  Medication Review/Management: medications reviewed  Taken 7/28/2024 0426 by Jaclyn Cabrera RN  Medication Review/Management: medications reviewed  Taken 7/28/2024 0238 by Jaclyn Cabrera RN  Medication Review/Management: medications reviewed  Taken 7/28/2024 0040 by Jaclyn Cabrera RN  Medication Review/Management: medications reviewed  Taken 7/27/2024 2209 by Jaclyn Cabrera RN  Medication Review/Management: medications reviewed  Taken 7/27/2024 2105 by Jaclyn Cabrera RN  Medication Review/Management: medications reviewed     Problem: Adjustment to Illness (Sepsis/Septic Shock)  Goal: Optimal Coping  Outcome: Ongoing, Progressing     Problem: Bleeding (Sepsis/Septic Shock)  Goal: Absence of Bleeding  Outcome: Ongoing, Progressing     Problem: Glycemic Control Impaired (Sepsis/Septic Shock)  Goal: Blood Glucose Level Within Desired Range  Outcome: Ongoing, Progressing     Problem: Infection Progression (Sepsis/Septic  Shock)  Goal: Absence of Infection Signs and Symptoms  Outcome: Ongoing, Progressing  Intervention: Initiate Sepsis Management  Recent Flowsheet Documentation  Taken 7/28/2024 0040 by Jaclyn Cabrera RN  Infection Prevention:   environmental surveillance performed   single patient room provided   rest/sleep promoted  Taken 7/27/2024 2105 by Jaclyn Cabrera, RN  Infection Prevention:   environmental surveillance performed   rest/sleep promoted   single patient room provided     Problem: Nutrition Impaired (Sepsis/Septic Shock)  Goal: Optimal Nutrition Intake  Outcome: Ongoing, Progressing   Goal Outcome Evaluation:  Plan of Care Reviewed With: patient        Progress: no change  Outcome Evaluation: Pt A&Ox4 on RA. PRN tylenol admin to treat fever. NS infusing at 100 ml/hr. Meds admin as ordered. Admission documentation and assessment completed, plan of care ongoing.

## 2024-07-28 NOTE — PROGRESS NOTES
"[facility] Clinical Pharmacy Services: Vancomycin Pharmacokinetic Initial Consult Note    Brandy El is a 64 y.o. female who is on day 1 of pharmacy to dose vancomycin.    Indication: Skin and Soft Tissue  Consulting Provider: Dr. Zavala  Planned Duration of Therapy: 7 days  Loading Dose Ordered or Given: 1500 mg on 7/27 at 2035  MRSA PCR performed: No  Culture/Source: Bcx in process  Target: -600 mg/L.hr   Pertinent Vanc Dosing History:   Other Antimicrobials: Ceftriaxone    Vitals/Labs  Ht: 160 cm (63\"); Wt: 77.1 kg (170 lb)  Temp Readings from Last 1 Encounters:   07/27/24 99.7 °F (37.6 °C) (Oral)    Estimated Creatinine Clearance: 76.5 mL/min (by C-G formula based on SCr of 0.75 mg/dL).        Results from last 7 days   Lab Units 07/27/24  1833   CREATININE mg/dL 0.75   WBC 10*3/mm3 18.02*     Assessment/Plan:    Vancomycin Dose:   1000 mg IV every  12  hours  Predictive AUC level for the dose ordered is 528 mg/L.hr, which is within the target of 400-600 mg/L.hr  Vanc Trough has been ordered for 7/29 at 0730     Pharmacy will follow patient's kidney function and will adjust doses and obtain levels as necessary. Thank you for involving pharmacy in this patient's care. Please contact pharmacy with any questions or concerns.                           Dylan Taylor, [facility]  Clinical Pharmacist   "

## 2024-07-28 NOTE — PROGRESS NOTES
"DAILY PROGRESS NOTE  [facility]    Patient Identification:  Name: Brandy El  Age: 64 y.o.  Sex: female  :  1960  MRN: [id number]         Primary Care Physician: Tiffanie Woodard MD    Subjective:  Interval History: She is feeling a little bit better today.    Objective:    Scheduled Meds:[Held by provider] amLODIPine, 2.5 mg, Oral, Daily  atorvastatin, 20 mg, Oral, Daily  buPROPion XL, 450 mg, Oral, QAM  cefTRIAXone, 2,000 mg, Intravenous, Q24H  enoxaparin, 40 mg, Subcutaneous, Nightly  escitalopram, 10 mg, Oral, Nightly  propranolol, 30 mg, Oral, Q12H  sodium chloride, 10 mL, Intravenous, Q12H  vancomycin, 1,000 mg, Intravenous, Q12H      Continuous Infusions:Pharmacy to dose vancomycin,   sodium chloride, 100 mL/hr, Last Rate: 100 mL/hr (24)        Vital signs in last 24 hours:  Temp:  [98.8 °F (37.1 °C)-102.2 °F (39 °C)] 98.8 °F (37.1 °C)  Heart Rate:  [] 72  Resp:  [16-20] 16  BP: (117-173)/(62-92) 117/63    Intake/Output:    Intake/Output Summary (Last 24 hours) at 2024 1355  Last data filed at 2024 0904  Gross per 24 hour   Intake 550 ml   Output --   Net 550 ml       Exam:  /63 (BP Location: Right arm, Patient Position: Lying)   Pulse 72   Temp 98.8 °F (37.1 °C) (Oral)   Resp 16   Ht 160 cm (63\")   Wt 82.3 kg (181 lb 6.4 oz)   SpO2 93%   BMI 32.13 kg/m²     General Appearance:    Alert, cooperative, no distress   Head:    Normocephalic, without obvious abnormality, atraumatic   Eyes:       Throat:   Lips, tongue, gums normal   Neck:   Supple, symmetrical, trachea midline, no JVD   Lungs:     Clear to auscultation bilaterally, respirations unlabored   Chest Wall:    No tenderness or deformity    Heart:    Regular rate and rhythm, S1 and S2 normal, no murmur,no  rub or gallop   Abdomen:     Soft, nontender, bowel sounds active, no masses, no organomegaly    Extremities:   Extremities normal, atraumatic, no cyanosis or edema   Pulses:      Skin:   " Skin is warm and dry, cellulitis on right lower extremity   Neurologic:   no focal deficits noted      Lab Results (last 72 hours)       Procedure Component Value Units Date/Time    Basic Metabolic Panel [[id number]]  (Abnormal) Collected: 07/28/24 0535    Specimen: Blood Updated: 07/28/24 0611     Glucose 118 mg/dL      BUN 9 mg/dL      Creatinine 0.67 mg/dL      Sodium 136 mmol/L      Potassium 3.9 mmol/L      Chloride 105 mmol/L      CO2 24.0 mmol/L      Calcium 8.0 mg/dL      BUN/Creatinine Ratio 13.4     Anion Gap 7.0 mmol/L      eGFR 99.0 mL/min/1.73     Narrative:      GFR Normal >60  Chronic Kidney Disease <60  Kidney Failure <15      Magnesium [[id number]]  (Normal) Collected: 07/28/24 0535    Specimen: Blood Updated: 07/28/24 0611     Magnesium 1.9 mg/dL     Phosphorus [[id number]]  (Abnormal) Collected: 07/28/24 0535    Specimen: Blood Updated: 07/28/24 0611     Phosphorus 2.1 mg/dL     CBC (No Diff) [[id number]]  (Abnormal) Collected: 07/28/24 0535    Specimen: Blood Updated: 07/28/24 0607     WBC 12.72 10*3/mm3      RBC 3.63 10*6/mm3      Hemoglobin 10.8 g/dL      Hematocrit 33.2 %      MCV 91.5 fL      MCH 29.8 pg      MCHC 32.5 g/dL      RDW 12.4 %      RDW-SD 41.2 fl      MPV 9.0 fL      Platelets 194 10*3/mm3     Hemoglobin A1c [[id number]]  (Normal) Collected: 07/28/24 0536    Specimen: Blood Updated: 07/28/24 0604     Hemoglobin A1C 5.10 %     Narrative:      Hemoglobin A1C Ranges:    Increased Risk for Diabetes  5.7% to 6.4%  Diabetes                     >= 6.5%  Diabetic Goal                < 7.0%    Blood Culture - Blood, Arm, Right [[id number]] Collected: 07/27/24 1931    Specimen: Blood from Arm, Right Updated: 07/27/24 1937    Blood Culture - Blood, Arm, Left [[id number]] Collected: 07/27/24 1931    Specimen: Blood from Arm, Left Updated: 07/27/24 1937    Comprehensive Metabolic Panel [[id number]]  (Abnormal) Collected: 07/27/24 1833    Specimen: Blood Updated: 07/27/24 1905     Glucose 120 mg/dL       BUN 17 mg/dL      Creatinine 0.75 mg/dL      Sodium 138 mmol/L      Potassium 3.8 mmol/L      Chloride 103 mmol/L      CO2 22.0 mmol/L      Calcium 8.9 mg/dL      Total Protein 7.3 g/dL      Albumin 4.3 g/dL      ALT (SGPT) 18 U/L      AST (SGOT) 16 U/L      Alkaline Phosphatase 67 U/L      Total Bilirubin 0.5 mg/dL      Globulin 3.0 gm/dL      A/G Ratio 1.4 g/dL      BUN/Creatinine Ratio 22.7     Anion Gap 13.0 mmol/L      eGFR 90.1 mL/min/1.73     Narrative:      GFR Normal >60  Chronic Kidney Disease <60  Kidney Failure <15      Lactic Acid, Plasma [[id number]]  (Normal) Collected: 07/27/24 1833    Specimen: Blood Updated: 07/27/24 1859     Lactate 2.0 mmol/L     [place] Draw [[id number]] Collected: 07/27/24 1833    Specimen: Blood Updated: 07/27/24 1845    Narrative:      The following orders were created for panel order [place] Draw.  Procedure                               Abnormality         Status                     ---------                               -----------         ------                     Green Top (Gel)[[id number]]                                  Final result               Lavender Top[[id number]]                                     Final result               Gold Top - SST[[id number]]                                   Final result               Light Blue Top[[id number]]                                   Final result                 Please view results for these tests on the individual orders.    Green Top (Gel) [[id number]] Collected: 07/27/24 1833    Specimen: Blood Updated: 07/27/24 1845     Extra Tube Hold for add-ons.     Comment: Auto resulted.       Lavender Top [[id number]] Collected: 07/27/24 1833    Specimen: Blood Updated: 07/27/24 1845     Extra Tube hold for add-on     Comment: Auto resulted       Gold Top - SST [[id number]] Collected: 07/27/24 1833    Specimen: Blood Updated: 07/27/24 1845     Extra Tube Hold for add-ons.     Comment: Auto resulted.       Light Blue Top [[id number]]  Collected: 07/27/24 1833    Specimen: Blood Updated: 07/27/24 1845     Extra Tube Hold for add-ons.     Comment: Auto resulted       CBC & Differential [[id number]]  (Abnormal) Collected: 07/27/24 1833    Specimen: Blood Updated: 07/27/24 1845    Narrative:      The following orders were created for panel order CBC & Differential.  Procedure                               Abnormality         Status                     ---------                               -----------         ------                     CBC Auto Differential[[id number]]        Abnormal            Final result                 Please view results for these tests on the individual orders.    CBC Auto Differential [[id number]]  (Abnormal) Collected: 07/27/24 1833    Specimen: Blood Updated: 07/27/24 1845     WBC 18.02 10*3/mm3      RBC 4.35 10*6/mm3      Hemoglobin 13.2 g/dL      Hematocrit 39.6 %      MCV 91.0 fL      MCH 30.3 pg      MCHC 33.3 g/dL      RDW 12.1 %      RDW-SD 40.2 fl      MPV 8.9 fL      Platelets 267 10*3/mm3      Neutrophil % 92.0 %      Lymphocyte % 4.3 %      Monocyte % 2.8 %      Eosinophil % 0.3 %      Basophil % 0.2 %      Immature Grans % 0.4 %      Neutrophils, Absolute 16.57 10*3/mm3      Lymphocytes, Absolute 0.78 10*3/mm3      Monocytes, Absolute 0.50 10*3/mm3      Eosinophils, Absolute 0.05 10*3/mm3      Basophils, Absolute 0.04 10*3/mm3      Immature Grans, Absolute 0.08 10*3/mm3      nRBC 0.0 /100 WBC           Data Review:  Results from last 7 days   Lab Units 07/28/24  0535 07/27/24 1833   SODIUM mmol/L 136 138   POTASSIUM mmol/L 3.9 3.8   CHLORIDE mmol/L 105 103   CO2 mmol/L 24.0 22.0   BUN mg/dL 9 17   CREATININE mg/dL 0.67 0.75   GLUCOSE mg/dL 118* 120*   CALCIUM mg/dL 8.0* 8.9     Results from last 7 days   Lab Units 07/28/24  0535 07/27/24 1833   WBC 10*3/mm3 12.72* 18.02*   HEMOGLOBIN g/dL 10.8* 13.2   HEMATOCRIT % 33.2* 39.6   PLATELETS 10*3/mm3 194 500         Results from last 7 days   Lab Units 07/28/24  0500  "  HEMOGLOBIN A1C % 5.10     No results found for: \"TROPONINT\"      Results from last 7 days   Lab Units 07/27/24  1833   ALK PHOS U/L 67   BILIRUBIN mg/dL 0.5   ALT (SGPT) U/L 18   AST (SGOT) U/L 16         Results from last 7 days   Lab Units 07/28/24  0536   HEMOGLOBIN A1C % 5.10     No results found for: \"POCGLU\"        Past Medical History:   Diagnosis Date    Anxiety     ASCUS of cervix with negative high risk HPV 09/2001    ASCUS of cervix with negative high risk HPV 09/2005    Colon polyp 2013    Depression     Essential hematuria     Gait disturbance     R foot drop    Health care maintenance 06/14/2016    Hyperlipidemia     Hypertension 2020    Irritable bowel syndrome many years ago    Postmenopausal     Vaginal delivery        Assessment:  Active Hospital Problems    Diagnosis  POA    **Sepsis [A41.9]  Yes    Cellulitis of right lower extremity [L03.115]  Yes    History of MRSA infection [Z86.14]  Yes    Hyperlipidemia [E78.5]  Yes      Resolved Hospital Problems   No resolved problems to display.       Plan:  Continue with antibiotics and await results of cultures.  Follow-up on labs.  Zofran for nausea.  Probably needs another day or 2.  Will get MRSA screen.    Billy Ricks MD  7/28/2024  13:55 EDT    "

## 2024-07-29 LAB
ANION GAP SERPL CALCULATED.3IONS-SCNC: 7 MMOL/L (ref 5–15)
BASOPHILS # BLD AUTO: 0.04 10*3/MM3 (ref 0–0.2)
BASOPHILS NFR BLD AUTO: 0.9 % (ref 0–1.5)
BUN SERPL-MCNC: 7 MG/DL (ref 8–23)
BUN/CREAT SERPL: 10.8 (ref 7–25)
CALCIUM SPEC-SCNC: 8.5 MG/DL (ref 8.6–10.5)
CHLORIDE SERPL-SCNC: 109 MMOL/L (ref 98–107)
CO2 SERPL-SCNC: 24 MMOL/L (ref 22–29)
CREAT SERPL-MCNC: 0.65 MG/DL (ref 0.57–1)
DEPRECATED RDW RBC AUTO: 41.7 FL (ref 37–54)
EGFRCR SERPLBLD CKD-EPI 2021: 99.7 ML/MIN/1.73
EOSINOPHIL # BLD AUTO: 0.08 10*3/MM3 (ref 0–0.4)
EOSINOPHIL NFR BLD AUTO: 1.8 % (ref 0.3–6.2)
ERYTHROCYTE [DISTWIDTH] IN BLOOD BY AUTOMATED COUNT: 12.5 % (ref 12.3–15.4)
GLUCOSE SERPL-MCNC: 107 MG/DL (ref 65–99)
HCT VFR BLD AUTO: 33.9 % (ref 34–46.6)
HGB BLD-MCNC: 11.3 G/DL (ref 12–15.9)
IMM GRANULOCYTES # BLD AUTO: 0.02 10*3/MM3 (ref 0–0.05)
IMM GRANULOCYTES NFR BLD AUTO: 0.4 % (ref 0–0.5)
LYMPHOCYTES # BLD AUTO: 0.71 10*3/MM3 (ref 0.7–3.1)
LYMPHOCYTES NFR BLD AUTO: 15.9 % (ref 19.6–45.3)
MCH RBC QN AUTO: 30.8 PG (ref 26.6–33)
MCHC RBC AUTO-ENTMCNC: 33.3 G/DL (ref 31.5–35.7)
MCV RBC AUTO: 92.4 FL (ref 79–97)
MONOCYTES # BLD AUTO: 0.27 10*3/MM3 (ref 0.1–0.9)
MONOCYTES NFR BLD AUTO: 6 % (ref 5–12)
NEUTROPHILS NFR BLD AUTO: 3.35 10*3/MM3 (ref 1.7–7)
NEUTROPHILS NFR BLD AUTO: 75 % (ref 42.7–76)
NRBC BLD AUTO-RTO: 0 /100 WBC (ref 0–0.2)
PHOSPHATE SERPL-MCNC: 2.3 MG/DL (ref 2.5–4.5)
PHOSPHATE SERPL-MCNC: 2.7 MG/DL (ref 2.5–4.5)
PLATELET # BLD AUTO: 187 10*3/MM3 (ref 140–450)
PMV BLD AUTO: 9.3 FL (ref 6–12)
POTASSIUM SERPL-SCNC: 4 MMOL/L (ref 3.5–5.2)
RBC # BLD AUTO: 3.67 10*6/MM3 (ref 3.77–5.28)
SODIUM SERPL-SCNC: 140 MMOL/L (ref 136–145)
VANCOMYCIN TROUGH SERPL-MCNC: 6.8 MCG/ML (ref 5–20)
WBC NRBC COR # BLD AUTO: 4.47 10*3/MM3 (ref 3.4–10.8)

## 2024-07-29 PROCEDURE — 84100 ASSAY OF PHOSPHORUS: CPT | Performed by: HOSPITALIST

## 2024-07-29 PROCEDURE — 25010000002 CEFAZOLIN PER 500 MG: Performed by: HOSPITALIST

## 2024-07-29 PROCEDURE — 25010000002 CEFTRIAXONE PER 250 MG: Performed by: STUDENT IN AN ORGANIZED HEALTH CARE EDUCATION/TRAINING PROGRAM

## 2024-07-29 PROCEDURE — 25810000003 SODIUM CHLORIDE 0.9 % SOLUTION 250 ML FLEX CONT: Performed by: STUDENT IN AN ORGANIZED HEALTH CARE EDUCATION/TRAINING PROGRAM

## 2024-07-29 PROCEDURE — 80048 BASIC METABOLIC PNL TOTAL CA: CPT | Performed by: HOSPITALIST

## 2024-07-29 PROCEDURE — 25010000002 ONDANSETRON PER 1 MG: Performed by: HOSPITALIST

## 2024-07-29 PROCEDURE — 25810000003 SODIUM CHLORIDE 0.9 % SOLUTION: Performed by: STUDENT IN AN ORGANIZED HEALTH CARE EDUCATION/TRAINING PROGRAM

## 2024-07-29 PROCEDURE — 25010000002 VANCOMYCIN 1 G RECONSTITUTED SOLUTION 1 EACH VIAL: Performed by: STUDENT IN AN ORGANIZED HEALTH CARE EDUCATION/TRAINING PROGRAM

## 2024-07-29 PROCEDURE — 80202 ASSAY OF VANCOMYCIN: CPT | Performed by: STUDENT IN AN ORGANIZED HEALTH CARE EDUCATION/TRAINING PROGRAM

## 2024-07-29 PROCEDURE — 85025 COMPLETE CBC W/AUTO DIFF WBC: CPT | Performed by: HOSPITALIST

## 2024-07-29 PROCEDURE — 25010000002 ENOXAPARIN PER 10 MG: Performed by: STUDENT IN AN ORGANIZED HEALTH CARE EDUCATION/TRAINING PROGRAM

## 2024-07-29 RX ADMIN — ENOXAPARIN SODIUM 40 MG: 100 INJECTION SUBCUTANEOUS at 21:29

## 2024-07-29 RX ADMIN — ATORVASTATIN CALCIUM 20 MG: 20 TABLET, FILM COATED ORAL at 08:43

## 2024-07-29 RX ADMIN — SODIUM CHLORIDE 2000 MG: 900 INJECTION INTRAVENOUS at 23:19

## 2024-07-29 RX ADMIN — CEFTRIAXONE 2000 MG: 2 INJECTION, POWDER, FOR SOLUTION INTRAMUSCULAR; INTRAVENOUS at 10:37

## 2024-07-29 RX ADMIN — Medication 10 ML: at 21:29

## 2024-07-29 RX ADMIN — PROPRANOLOL HYDROCHLORIDE 30 MG: 20 TABLET ORAL at 21:29

## 2024-07-29 RX ADMIN — SODIUM CHLORIDE 2000 MG: 900 INJECTION INTRAVENOUS at 17:18

## 2024-07-29 RX ADMIN — Medication 10 ML: at 08:50

## 2024-07-29 RX ADMIN — ONDANSETRON 4 MG: 2 INJECTION INTRAMUSCULAR; INTRAVENOUS at 17:48

## 2024-07-29 RX ADMIN — ESCITALOPRAM OXALATE 10 MG: 10 TABLET, FILM COATED ORAL at 21:29

## 2024-07-29 RX ADMIN — POTASSIUM, SODIUM PHOSPHATES 280 MG-160 MG-250 MG ORAL POWDER PACKET 2 PACKET: POWDER IN PACKET at 12:12

## 2024-07-29 RX ADMIN — SODIUM CHLORIDE 1000 MG: 9 INJECTION, SOLUTION INTRAVENOUS at 08:43

## 2024-07-29 RX ADMIN — PROPRANOLOL HYDROCHLORIDE 30 MG: 20 TABLET ORAL at 08:43

## 2024-07-29 RX ADMIN — SODIUM CHLORIDE 100 ML/HR: 9 INJECTION, SOLUTION INTRAVENOUS at 17:48

## 2024-07-29 RX ADMIN — BUPROPION HYDROCHLORIDE 450 MG: 300 TABLET, EXTENDED RELEASE ORAL at 08:43

## 2024-07-29 NOTE — PROGRESS NOTES
"[facility]: Vancomycin Monitoring Note    Brandy El is a 64 y.o. female who is on day 3/7 of pharmacy to dose vancomycin for Skin and Soft Tissue infection RLE cellulitis. Pt has Hx of MRSA    Updated Cultures and Sensitivities:   7/27 BC pending  Results from last 7 days   Lab Units 07/29/24  0819   VANCOMYCIN TR mcg/mL 6.80     Vitals/Labs  Ht: 160 cm (63\"); Wt: 81.3 kg (179 lb 4.8 oz)   Temp Readings from Last 1 Encounters:   07/29/24 98.1 °F (36.7 °C) (Oral)     Estimated Creatinine Clearance: 90.7 mL/min (by C-G formula based on SCr of 0.65 mg/dL).       Results from last 7 days   Lab Units 07/29/24  0819 07/28/24  0535 07/27/24  1833   CREATININE mg/dL 0.65 0.67 0.75   WBC 10*3/mm3 4.47 12.72* 18.02*     Assessment/Plan  Trough low on current dose, will increase   Current Vancomycin Dose: 1250 mg IV every  12  hours; provides a predicted  mg/L.hr  Next Level Date and Time:  not ordered based on duration of therapy   We will continue to monitor patient changes and renal function     Thank you for involving pharmacy in this patient's care. Please contact pharmacy with any questions or concerns.       Marija Garcia [facility]  Clinical Pharmacist            "

## 2024-07-29 NOTE — PAYOR COMM NOTE
"Shaw Inman (64 y.o. Female)        PLEASE SEE ATTACHED FOR INPT AUTH.             PLEASE CALL   OR  [phone]    THANK YOU    MIAN TYLER LPN [facility]   Date of Birth   1960    Social Security Number       Address   [address]  [facility] [address]    Home Phone   [phone]    MRN   [id number]       Mandaen   Confucianism    Marital Status                               Admission Date   7/27/24    Admission Type   Emergency    Admitting Provider   Addis Zavala MD    Attending Provider   Billy Ricks MD    Department, Room/Bed   [address], S611/1       Discharge Date       Discharge Disposition       Discharge Destination                                 Attending Provider: Billy Ricks MD    Allergies: Phenergan [Promethazine Hcl]    Isolation: None   Infection: None   Code Status: CPR    Ht: 160 cm (63\")   Wt: 81.3 kg (179 lb 4.8 oz)    Admission Cmt: None   Principal Problem: Sepsis [A41.9]                   Active Insurance as of 7/27/2024       Primary Coverage       Payor Plan Insurance Group Employer/Plan Group    [employer]HE[employer] BLUE CROSS AN[employer]M BLUE CROSS BLUE SHIELD PPO [id number]       Payor Plan Address Payor Plan Phone Number Payor Plan Fax Number Effective Dates    [address] [phone]  1/1/2024 - None Entered    [place] [address]         Subscriber Name Subscriber Birth Date Member ID       SHAW INMAN 1960 [id number]                     Emergency Contacts        (Rel.) Home Phone Work Phone Mobile Phone    kirill inman (Daughter) -- -- [phone]              [place]: [facility] [id number]  Tax ID [id number]     History & Physical        Addis Zavala MD at 07/27/24 1941              Patient Name:  Shaw Inman  YOB: 1960  MRN:  [id number]  Admit Date:  7/27/2024  Patient Care Team:  Tiffanie Woodard MD as PCP - General  Tiffanie Woodard MD as PCP - Family " Medicine      Subjective  History Present Illness     Chief Complaint   Patient presents with    Leg Pain         History of Present Illness  Patient is a 64-year-old female with a history of hypertension, hyperlipidemia, anxiety depression, MRSA infection, history of right lower extremity cellulitis, presented to the ED complaining of pain, worsening redness and swelling and pain in right lower extremity.  Patient reports symptoms noted.  Approximately 2 weeks ago, she was seen at urgent care, was diagnosed with cellulitis, and prescribed Keflex for 7 days.  She did not improvement of antibiotics however symptoms did not completely resolve, and she continued to have redness.  Noticed worsening swelling, pain, erythema today and developed shaking chills and fever up to 100 F and decided to present to the ED.  Denies any known injury.  Did have right lower extremity cellulitis approximately 1 year ago.  Does have history of MRSA infection in her pthumb    Review of Systems     GENERAL: + fevers, chills, sweats.    RESPIRATORY: No cough, shortness of breath, hemoptysis or wheezing.   CVS: No chest pain, palpitations, orthopnea, dyspnea on exertion   GI: No melena or hematochezia. No abdominal pain. No nausea, vomiting, constipation, diarrhea  Extremities:  +Right lower extremity pain,swelling, redness      Personal History     Past Medical History:   Diagnosis Date    Anxiety     ASCUS of cervix with negative high risk HPV 09/2001    ASCUS of cervix with negative high risk HPV 09/2005    Colon polyp 2013    Depression     Essential hematuria     Gait disturbance     R foot drop    Health care maintenance 06/14/2016    Hyperlipidemia     Hypertension 2020    Irritable bowel syndrome many years ago    Postmenopausal     Vaginal delivery      Past Surgical History:   Procedure Laterality Date    ANKLE SURGERY      BACK SURGERY      COLONOSCOPY  10/22/2013    tics, IH, HP    COLONOSCOPY N/A 12/03/2018    Procedure:  COLONOSCOPY TO CECUM AND INTO TERMINAL ILEUM WITH COLD BIOPSIES AND COLD BIOPSY POLYPECTOMY;  Surgeon: Syd Emerson MD;  Location: [facility] ENDOSCOPY;  Service: Gastroenterology    ENDOSCOPY N/A 12/03/2018    Procedure: ESOPHAGOGASTRODUODENOSCOPY WITH COLD BIOPSIES;  Surgeon: Syd Emerson MD;  Location: [facility] ENDOSCOPY;  Service: Gastroenterology    LAPAROSCOPIC TUBAL LIGATION Bilateral     TEAR DUCT SURGERY  06/2017    TUBAL ABDOMINAL LIGATION       Family History   Problem Relation Age of Onset    COPD Mother     Heart disease Father     Heart disease Brother     Cancer Brother     COPD Brother     Breast cancer Sister     No Known Problems Daughter     No Known Problems Son     No Known Problems Maternal Grandmother     No Known Problems Paternal Grandmother     No Known Problems Maternal Aunt     No Known Problems Paternal Aunt     BRCA 1/2 Neg Hx     Colon cancer Neg Hx     Endometrial cancer Neg Hx     Ovarian cancer Neg Hx      Social History     Tobacco Use    Smoking status: Never    Smokeless tobacco: Never   Vaping Use    Vaping status: Never Used   Substance Use Topics    Alcohol use: Not Currently     Alcohol/week: 2.0 standard drinks of alcohol     Types: 2 Shots of liquor per week     Comment: 2 x weekly    Drug use: No     No current facility-administered medications on file prior to encounter.     Current Outpatient Medications on File Prior to Encounter   Medication Sig Dispense Refill    amLODIPine (NORVASC) 2.5 MG tablet Take 1 tablet by mouth Daily. 90 tablet 1    atorvastatin (LIPITOR) 20 MG tablet TAKE 1 TABLET BY MOUTH DAILY 90 tablet 1    buPROPion XL (WELLBUTRIN XL) 150 MG 24 hr tablet Take 3 tablets by mouth Every Morning.      buPROPion XL (WELLBUTRIN XL) 300 MG 24 hr tablet       cetirizine (zyrTEC) 10 MG tablet Take 1 tablet by mouth Daily for 30 days. 30 tablet 0    clonazePAM (KlonoPIN) 0.5 MG tablet Take 1 tablet by mouth 2 (Two) Times a Day As Needed for Anxiety. 30 tablet 2     clonazePAM (KlonoPIN) 1 MG tablet       escitalopram (LEXAPRO) 10 MG tablet       fluticasone (FLONASE) 50 MCG/ACT nasal spray 2 sprays into the nostril(s) as directed by provider Daily. 16 g 3    omega-3 acid ethyl esters (LOVAZA) 1 g capsule TAKE 2 CAPSULES BY MOUTH TWICE A  capsule 3    propranolol LA (INDERAL LA) 60 MG 24 hr capsule TAKE 1 CAPSULE BY MOUTH DAILY 30 capsule 4    traZODone (DESYREL) 50 MG tablet       valsartan (DIOVAN) 320 MG tablet TAKE ONE TABLET BY MOUTH DAILY 90 tablet 3     Allergies   Allergen Reactions    Phenergan [Promethazine Hcl] Anxiety       Objective   Objective     Vital Signs  Temp:  [102.2 °F (39 °C)] 102.2 °F (39 °C)  Heart Rate:  [110] 110  Resp:  [16] 16  BP: (164)/(92) 164/92  SpO2:  [93 %] 93 %  on   ;      Body mass index is 30.11 kg/m².    Physical Exam  General: Alert and oriented x3, no acute distress.  HEENT: Normocephalic, atraumatic  Eyes: PERRL, EOMI, anicteric sclerae  Lungs: Clear to auscultation bilaterally, no crackles or wheezes  CV: Regular rate and rhythm, no murmurs rubs or gallops  Abdomen: Soft, nontender, nondistended.  Normoactive bowel sounds  Extremities: Right lower extremity edema, +erythema below knee primarily in anterior and medial leg,, tenderness to palpation.  No significant left lower extremity edema.  Skin: Clean/dry/intact, no rashes  Neuro: Cranial nerves II through XII intact, no gross focal neurological deficits appreciated  Psych: Appropriate mood and affect    Results Review:  I reviewed the patient's new clinical results.  I reviewed the patient's new imaging results and agree with the interpretation.  I reviewed the patient's other test results and agree with the interpretation  I personally viewed and interpreted the patient's EKG/Telemetry data  Discussed with ED provider.    Lab Results (last 24 hours)       Procedure Component Value Units Date/Time    CBC & Differential [[id number]]  (Abnormal) Collected: 07/27/24 5514     Specimen: Blood Updated: 07/27/24 1845    Narrative:      The following orders were created for panel order CBC & Differential.  Procedure                               Abnormality         Status                     ---------                               -----------         ------                     CBC Auto Differential[[id number]]        Abnormal            Final result                 Please view results for these tests on the individual orders.    Comprehensive Metabolic Panel [[id number]]  (Abnormal) Collected: 07/27/24 1833    Specimen: Blood Updated: 07/27/24 1905     Glucose 120 mg/dL      BUN 17 mg/dL      Creatinine 0.75 mg/dL      Sodium 138 mmol/L      Potassium 3.8 mmol/L      Chloride 103 mmol/L      CO2 22.0 mmol/L      Calcium 8.9 mg/dL      Total Protein 7.3 g/dL      Albumin 4.3 g/dL      ALT (SGPT) 18 U/L      AST (SGOT) 16 U/L      Alkaline Phosphatase 67 U/L      Total Bilirubin 0.5 mg/dL      Globulin 3.0 gm/dL      A/G Ratio 1.4 g/dL      BUN/Creatinine Ratio 22.7     Anion Gap 13.0 mmol/L      eGFR 90.1 mL/min/1.73     Narrative:      GFR Normal >60  Chronic Kidney Disease <60  Kidney Failure <15      Lactic Acid, Plasma [[id number]]  (Normal) Collected: 07/27/24 1833    Specimen: Blood Updated: 07/27/24 1859     Lactate 2.0 mmol/L     CBC Auto Differential [[id number]]  (Abnormal) Collected: 07/27/24 1833    Specimen: Blood Updated: 07/27/24 1845     WBC 18.02 10*3/mm3      RBC 4.35 10*6/mm3      Hemoglobin 13.2 g/dL      Hematocrit 39.6 %      MCV 91.0 fL      MCH 30.3 pg      MCHC 33.3 g/dL      RDW 12.1 %      RDW-SD 40.2 fl      MPV 8.9 fL      Platelets 267 10*3/mm3      Neutrophil % 92.0 %      Lymphocyte % 4.3 %      Monocyte % 2.8 %      Eosinophil % 0.3 %      Basophil % 0.2 %      Immature Grans % 0.4 %      Neutrophils, Absolute 16.57 10*3/mm3      Lymphocytes, Absolute 0.78 10*3/mm3      Monocytes, Absolute 0.50 10*3/mm3      Eosinophils, Absolute 0.05 10*3/mm3      Basophils,  Absolute 0.04 10*3/mm3      Immature Grans, Absolute 0.08 10*3/mm3      nRBC 0.0 /100 WBC     Blood Culture - Blood, Arm, Left [[id number]] Collected: 07/27/24 1931    Specimen: Blood from Arm, Left Updated: 07/27/24 1937    Blood Culture - Blood, Arm, Right [[id number]] Collected: 07/27/24 1931    Specimen: Blood from Arm, Right Updated: 07/27/24 1937            Imaging Results (Last 24 Hours)       ** No results found for the last 24 hours. **            Results for orders placed in visit on 06/16/15    SCANNED - ECHOCARDIOGRAM      No orders to display        Assessment/Plan     Active Hospital Problems    Diagnosis  POA    **Sepsis [A41.9]  Yes    Cellulitis of right lower extremity [L03.115]  Yes    History of MRSA infection [Z86.14]  Yes    Hyperlipidemia [E78.5]  Yes      Resolved Hospital Problems   No resolved problems to display.   Patient is a 64-year-old female with a history of hypertension, hyperlipidemia, anxiety depression, MRSA infection, history of right lower extremity cellulitis, presented to the ED complaining of pain, worsening redness and swelling and pain in right lower extremity        Sepsis secondary to right lower extremity cellulitis  -Initially diagnosed with right lower send cellulitis approximately 2 weeks ago, was initiated on Keflex for 7 days, did have improvement of antibiotics however erythema has not completely resolved per patient.  Symptoms worsened prior to admission.  -WBC elevated at 18.02, patient febrile up to 102.2 on admission, tachycardic  -Received IV ceftriaxone and vancomycin in the ED, continue  -Blood cultures pending  -Hemodynamics stable, BP on the higher side  -Duplex ultrasound of right lower extremity ordered  -Pain management  -IV fluids bolus given in the ED, initiated on normal saline 100 cc an hour      Essential hypertension  -Patient with sepsis as above, however BP stable, on the higher side  -Resume propranolol, on propranolol ER 60 mg 24 hours  outpatient, initiated on 30 mg every 12 hours inpatient starting tomorrow   -Patient amlodipine 2.5 mg daily starting tomorrow  -Hold valsartan for now in the setting of sepsis, monitor BP and resume as indicated.      Anxiety/depression  -Resume bupropion, citalopram, as needed clonazepam      Hyperglycemia  -Hemoglobin A1c ordered        I discussed the patient's findings and my recommendations with patient, nursing staff, and ED provider.    VTE Prophylaxis - Lovenox 40 mg SC daily.  Code Status - Full code.       Addis Zavala MD  [facility]  24  19:43 EDT      Electronically signed by Addis Zavala MD at 24 2019          Discharge Summaryl        Billy Ricks MD at 24 1456          DAILY PROGRESS NOTE  [facility]    Patient Identification:  Name: Brandy El  Age: 64 y.o.  Sex: female  :  1960  MRN: [id number]         Primary Care Physician: Tiffanie Woodard MD    Subjective:  Interval History: She is feeling a little bit better today.    Objective:    Scheduled Meds:[Held by provider] amLODIPine, 2.5 mg, Oral, Daily  atorvastatin, 20 mg, Oral, Daily  buPROPion XL, 450 mg, Oral, QAM  cefTRIAXone, 2,000 mg, Intravenous, Q24H  enoxaparin, 40 mg, Subcutaneous, Nightly  escitalopram, 10 mg, Oral, Nightly  propranolol, 30 mg, Oral, Q12H  sodium chloride, 10 mL, Intravenous, Q12H  vancomycin, 1,250 mg, Intravenous, Q12H      Continuous Infusions:Pharmacy to dose vancomycin,   sodium chloride, 100 mL/hr, Last Rate: 100 mL/hr (24)        Vital signs in last 24 hours:  Temp:  [98.1 °F (36.7 °C)-99 °F (37.2 °C)] 98.1 °F (36.7 °C)  Heart Rate:  [61-67] 67  Resp:  [16-18] 18  BP: (115-149)/(66-75) 134/66    Intake/Output:    Intake/Output Summary (Last 24 hours) at 2024 5270  Last data filed at 2024 0833  Gross per 24 hour   Intake 120 ml   Output --   Net 120 ml       Exam:  /66 (BP Location: Left arm, Patient  "Position: Sitting)   Pulse 67   Temp 98.1 °F (36.7 °C) (Oral)   Resp 18   Ht 160 cm (63\")   Wt 81.3 kg (179 lb 4.8 oz)   SpO2 94%   BMI 31.76 kg/m²     General Appearance:    Alert, cooperative, no distress   Head:    Normocephalic, without obvious abnormality, atraumatic   Eyes:       Throat:   Lips, tongue, gums normal   Neck:   Supple, symmetrical, trachea midline, no JVD   Lungs:     Clear to auscultation bilaterally, respirations unlabored   Chest Wall:    No tenderness or deformity    Heart:    Regular rate and rhythm, S1 and S2 normal, no murmur,no  rub or gallop   Abdomen:     Soft, nontender, bowel sounds active, no masses, no organomegaly    Extremities:   Extremities normal, atraumatic, no cyanosis or edema   Pulses:      Skin:   Skin is warm and dry, cellulitis on right lower extremity   Neurologic:   no focal deficits noted      Lab Results (last 72 hours)       Procedure Component Value Units Date/Time    Basic Metabolic Panel [[id number]]  (Abnormal) Collected: 07/28/24 0535    Specimen: Blood Updated: 07/28/24 0611     Glucose 118 mg/dL      BUN 9 mg/dL      Creatinine 0.67 mg/dL      Sodium 136 mmol/L      Potassium 3.9 mmol/L      Chloride 105 mmol/L      CO2 24.0 mmol/L      Calcium 8.0 mg/dL      BUN/Creatinine Ratio 13.4     Anion Gap 7.0 mmol/L      eGFR 99.0 mL/min/1.73     Narrative:      Magnesium [[id number]]  (Normal) Collected: 07/28/24 0535    Specimen: Blood Updated: 07/28/24 0611     Magnesium 1.9 mg/dL     Phosphorus [[id number]]  (Abnormal) Collected: 07/28/24 0535    Specimen: Blood Updated: 07/28/24 0611     Phosphorus 2.1 mg/dL     CBC (No Diff) [[id number]]  (Abnormal) Collected: 07/28/24 0535    Specimen: Blood Updated: 07/28/24 0607     WBC 12.72 10*3/mm3      RBC 3.63 10*6/mm3      Hemoglobin 10.8 g/dL      Hematocrit 33.2 %      MCV 91.5 fL      MCH 29.8 pg      MCHC 32.5 g/dL      RDW 12.4 %      RDW-SD 41.2 fl      MPV 9.0 fL      Platelets 194 10*3/mm3     Hemoglobin " A1c [[id number]]  (Normal) Collected: 07/28/24 0536    Specimen: Blood Updated: 07/28/24 0604     Hemoglobin A1C 5.10 %     Narrative:      Hemoglobin A1C Ranges:    Increased Risk for Diabetes  5.7% to 6.4%  Diabetes                     >= 6.5%  Diabetic Goal                < 7.0%    Blood Culture - Blood, Arm, Right [[id number]] Collected: 07/27/24 1931    Specimen: Blood from Arm, Right Updated: 07/27/24 1937    Blood Culture - Blood, Arm, Left [[id number]] Collected: 07/27/24 1931    Specimen: Blood from Arm, Left Updated: 07/27/24 1937    Comprehensive Metabolic Panel [[id number]]  (Abnormal) Collected: 07/27/24 1833    Specimen: Blood Updated: 07/27/24 1905     Glucose 120 mg/dL      BUN 17 mg/dL      Creatinine 0.75 mg/dL      Sodium 138 mmol/L      Potassium 3.8 mmol/L      Chloride 103 mmol/L      CO2 22.0 mmol/L      Calcium 8.9 mg/dL      Total Protein 7.3 g/dL      Albumin 4.3 g/dL      ALT (SGPT) 18 U/L      AST (SGOT) 16 U/L      Alkaline Phosphatase 67 U/L      Total Bilirubin 0.5 mg/dL      Globulin 3.0 gm/dL      A/G Ratio 1.4 g/dL      BUN/Creatinine Ratio 22.7     Anion Gap 13.0 mmol/L      eGFR 90.1 mL/min/1.73     Narrative:      Lactic Acid, Plasma [[id number]]  (Normal) Collected: 07/27/24 1833    Specimen: Blood Updated: 07/27/24 1859     Lactate 2.0 mmol/L     [place] Draw [[id number]] Collected: 07/27/24 1833    Specimen: Blood Updated: 07/27/24 1845    Narrative:      Green Top (Gel) [[id number]] Collected: 07/27/24 1833    Specimen: Blood Updated: 07/27/24 1845     Extra Tube Hold for add-ons.     Comment: Auto resulted.       Lavender Top [[id number]] Collected: 07/27/24 1833    Specimen: Blood Updated: 07/27/24 1845     Extra Tube hold for add-on     Comment: Auto resulted       Gold Top - SST [[id number]] Collected: 07/27/24 1833    Specimen: Blood Updated: 07/27/24 1845     Extra Tube Hold for add-ons.     Comment: Auto resulted.       Light Blue Top [[id number]] Collected: 07/27/24 1833     Specimen: Blood Updated: 07/27/24 1845     Extra Tube Hold for add-ons.     Comment: Auto resulted       CBC & Differential [[id number]]  (Abnormal) Collected: 07/27/24 1833    Specimen: Blood Updated: 07/27/24 1845    Narrative:      The following orders were created for panel order CBC & Differential.  Procedure                               Abnormality         Status                     ---------                               -----------         ------                     CBC Auto Differential[[id number]]        Abnormal            Final result                 Please view results for these tests on the individual orders.    CBC Auto Differential [[id number]]  (Abnormal) Collected: 07/27/24 1833    Specimen: Blood Updated: 07/27/24 1845     WBC 18.02 10*3/mm3      RBC 4.35 10*6/mm3      Hemoglobin 13.2 g/dL      Hematocrit 39.6 %      MCV 91.0 fL      MCH 30.3 pg      MCHC 33.3 g/dL      RDW 12.1 %      RDW-SD 40.2 fl      MPV 8.9 fL      Platelets 267 10*3/mm3      Neutrophil % 92.0 %      Lymphocyte % 4.3 %      Monocyte % 2.8 %      Eosinophil % 0.3 %      Basophil % 0.2 %      Immature Grans % 0.4 %      Neutrophils, Absolute 16.57 10*3/mm3      Lymphocytes, Absolute 0.78 10*3/mm3      Monocytes, Absolute 0.50 10*3/mm3      Eosinophils, Absolute 0.05 10*3/mm3      Basophils, Absolute 0.04 10*3/mm3      Immature Grans, Absolute 0.08 10*3/mm3      nRBC 0.0 /100 WBC           Data Review:  Results from last 7 days   Lab Units 07/29/24 0819 07/28/24 0535 07/27/24 1833   SODIUM mmol/L 140 136 138   POTASSIUM mmol/L 4.0 3.9 3.8   CHLORIDE mmol/L 109* 105 103   CO2 mmol/L 24.0 24.0 22.0   BUN mg/dL 7* 9 17   CREATININE mg/dL 0.65 0.67 0.75   GLUCOSE mg/dL 107* 118* 120*   CALCIUM mg/dL 8.5* 8.0* 8.9     Results from last 7 days   Lab Units 07/29/24 0819 07/28/24 0535 07/27/24 1833   WBC 10*3/mm3 4.47 12.72* 18.02*   HEMOGLOBIN g/dL 11.3* 10.8* 13.2   HEMATOCRIT % 33.9* 33.2* 39.6   PLATELETS 10*3/mm3 187 194 267  "        Results from last 7 days   Lab Units 07/28/24  0536   HEMOGLOBIN A1C % 5.10     No results found for: \"TROPONINT\"      Results from last 7 days   Lab Units 07/27/24  1833   ALK PHOS U/L 67   BILIRUBIN mg/dL 0.5   ALT (SGPT) U/L 18   AST (SGOT) U/L 16         Results from last 7 days   Lab Units 07/28/24  0536   HEMOGLOBIN A1C % 5.10     No results found for: \"POCGLU\"        Past Medical History:   Diagnosis Date    Anxiety     ASCUS of cervix with negative high risk HPV 09/2001    ASCUS of cervix with negative high risk HPV 09/2005    Colon polyp 2013    Depression     Essential hematuria     Gait disturbance     R foot drop    Health care maintenance 06/14/2016    Hyperlipidemia     Hypertension 2020    Irritable bowel syndrome many years ago    Postmenopausal     Vaginal delivery        Assessment:  Active Hospital Problems    Diagnosis  POA    **Sepsis [A41.9]  Yes    Cellulitis of right lower extremity [L03.115]  Yes    History of MRSA infection [Z86.14]  Yes    Hyperlipidemia [E78.5]  Yes      Resolved Hospital Problems   No resolved problems to display.       Plan:  Continue with antibiotics and await results of cultures.  Follow-up on labs.  Zofran for nausea.  Probably needs another day or 2.  Since MRSA screen is negative will switch to capsule and DC vancomycin and Rocephin.    Billy Ricks MD  7/29/2024  14:57 EDT      Electronically signed by Billy Ricks MD at 07/29/24 1457       Marija Garcia RPH at 07/29/24 1456          [facility] Clinical Pharmacy Services: Vancomycin Monitoring Note    Brandy El is a 64 y.o. female who is on day 3/7 of pharmacy to dose vancomycin for Skin and Soft Tissue infection RLE cellulitis. Pt has Hx of MRSA    Updated Cultures and Sensitivities:   7/27 BC pending  Results from last 7 days   Lab Units 07/29/24  0819   VANCOMYCIN TR mcg/mL 6.80     Vitals/Labs  Ht: 160 cm (63\"); Wt: 81.3 kg (179 lb 4.8 oz)   Temp Readings from Last 1 Encounters:   07/29/24 " 98.1 °F (36.7 °C) (Oral)     Estimated Creatinine Clearance: 90.7 mL/min (by C-G formula based on SCr of 0.65 mg/dL).       Results from last 7 days   Lab Units 07/29/24  0819 07/28/24  0535 07/27/24  1833   CREATININE mg/dL 0.65 0.67 0.75   WBC 10*3/mm3 4.47 12.72* 18.02*     Assessment/Plan  Trough low on current dose, will increase   Current Vancomycin Dose: 1250 mg IV every  12  hours; provides a predicted  mg/L.hr  Next Level Date and Time:  not ordered based on duration of therapy   We will continue to monitor patient changes and renal function     Thank you for involving pharmacy in this patient's care. Please contact pharmacy with any questions or concerns.       Marija Garcia RPH  Clinical Pharmacist              Electronically signed by Marija Garcia RPH at 07/29/24 8476          Discharge Plan       Row Name 07/29/24 1029       Plan    Plan Return home - lives alone    Patient/Family in Agreement with Plan yes    Plan Comments Spoke with patient at bedside.  She lives alone, is IADL, uses no DME, has never used HH.  PCP is Dr. Tiffanie Woodard and pharmacy is [facility] [address] [facility] [place].  Patient drives.  Daughter Serene [phone] will assist if needed.  She states she has several family members available if needed.  She plans to return home at [place].  CCP will follow.  Zofia KERN                 Expected Discharge Date and Time       Expected Discharge Date Expected Discharge Time    Jul 30, 2024            Demographic Summary       Row Name 07/29/24 1027       General Information    Admission Type inpatient    Arrived From home    Referral Source admission list    Reason for Consult discharge planning    Preferred Language English                   Functional Status       Row Name 07/29/24 1027       Functional Status    Usual Activity Tolerance good    Current Activity Tolerance moderate       Functional Status, IADL    Medications independent    Meal Preparation independent     "Housekeeping independent    Laundry independent    Shopping independent       Mental Status    General Appearance WDL WDL       Mental Status Summary    Recent Changes in Mental Status/Cognitive Functioning no changes                               Becky S. Humeniuk, RN      Electronically signed by Humeniuk, Becky S, RN at 24 1031       Rickey Mitchell RN at 24 0458          Goal Outcome Evaluation:              Outcome Evaluation: vss overnight on RA.  NS @ 100ml/hr. IV abx.                                 Electronically signed by Rickey Mitchell RN at 24 6494       Billy Ricks MD at 24 1355          DAILY PROGRESS NOTE  [facility]    Patient Identification:  Name: Brandy El  Age: 64 y.o.  Sex: female  :  1960  MRN: [id number]         Primary Care Physician: Tiffnaie Woodard MD    Subjective:  Interval History: She is feeling a little bit better today.    Objective:    Scheduled Meds:[Held by provider] amLODIPine, 2.5 mg, Oral, Daily  atorvastatin, 20 mg, Oral, Daily  buPROPion XL, 450 mg, Oral, QAM  cefTRIAXone, 2,000 mg, Intravenous, Q24H  enoxaparin, 40 mg, Subcutaneous, Nightly  escitalopram, 10 mg, Oral, Nightly  propranolol, 30 mg, Oral, Q12H  sodium chloride, 10 mL, Intravenous, Q12H  vancomycin, 1,000 mg, Intravenous, Q12H      Continuous Infusions:Pharmacy to dose vancomycin,   sodium chloride, 100 mL/hr, Last Rate: 100 mL/hr (24)        Vital signs in last 24 hours:  Temp:  [98.8 °F (37.1 °C)-102.2 °F (39 °C)] 98.8 °F (37.1 °C)  Heart Rate:  [] 72  Resp:  [16-20] 16  BP: (117-173)/(62-92) 117/63    Intake/Output:    Intake/Output Summary (Last 24 hours) at 2024 1355  Last data filed at 2024 0904  Gross per 24 hour   Intake 550 ml   Output --   Net 550 ml       Exam:  /63 (BP Location: Right arm, Patient Position: Lying)   Pulse 72   Temp 98.8 °F (37.1 °C) (Oral)   Resp 16   Ht 160 cm (63\")   Wt 82.3 kg (181 lb 6.4 " oz)   SpO2 93%   BMI 32.13 kg/m²     General Appearance:    Alert, cooperative, no distress   Head:    Normocephalic, without obvious abnormality, atraumatic   Eyes:       Throat:   Lips, tongue, gums normal   Neck:   Supple, symmetrical, trachea midline, no JVD   Lungs:     Clear to auscultation bilaterally, respirations unlabored   Chest Wall:    No tenderness or deformity    Heart:    Regular rate and rhythm, S1 and S2 normal, no murmur,no  rub or gallop   Abdomen:     Soft, nontender, bowel sounds active, no masses, no organomegaly    Extremities:   Extremities normal, atraumatic, no cyanosis or edema   Pulses:      Skin:   Skin is warm and dry, cellulitis on right lower extremity   Neurologic:   no focal deficits noted      Lab Results (last 72 hours)       Procedure Component Value Units Date/Time    Basic Metabolic Panel [[id number]]  (Abnormal) Collected: 07/28/24 0535    Specimen: Blood Updated: 07/28/24 0611     Glucose 118 mg/dL      BUN 9 mg/dL      Creatinine 0.67 mg/dL      Sodium 136 mmol/L      Potassium 3.9 mmol/L      Chloride 105 mmol/L      CO2 24.0 mmol/L      Calcium 8.0 mg/dL      BUN/Creatinine Ratio 13.4     Anion Gap 7.0 mmol/L      eGFR 99.0 mL/min/1.73     Narrative:      GFR Normal >60  Chronic Kidney Disease <60  Kidney Failure <15      Magnesium [[id number]]  (Normal) Collected: 07/28/24 0535    Specimen: Blood Updated: 07/28/24 0611     Magnesium 1.9 mg/dL     Phosphorus [[id number]]  (Abnormal) Collected: 07/28/24 0535    Specimen: Blood Updated: 07/28/24 0611     Phosphorus 2.1 mg/dL     CBC (No Diff) [[id number]]  (Abnormal) Collected: 07/28/24 0535    Specimen: Blood Updated: 07/28/24 0607     WBC 12.72 10*3/mm3      RBC 3.63 10*6/mm3      Hemoglobin 10.8 g/dL      Hematocrit 33.2 %      MCV 91.5 fL      MCH 29.8 pg      MCHC 32.5 g/dL      RDW 12.4 %      RDW-SD 41.2 fl      MPV 9.0 fL      Platelets 194 10*3/mm3     Hemoglobin A1c [[id number]]  (Normal) Collected: 07/28/24 0536     Specimen: Blood Updated: 07/28/24 0604     Hemoglobin A1C 5.10 %     Narrative:      Hemoglobin A1C Ranges:    Increased Risk for Diabetes  5.7% to 6.4%  Diabetes                     >= 6.5%  Diabetic Goal                < 7.0%    Blood Culture - Blood, Arm, Right [[id number]] Collected: 07/27/24 1931    Specimen: Blood from Arm, Right Updated: 07/27/24 1937    Blood Culture - Blood, Arm, Left [[id number]] Collected: 07/27/24 1931    Specimen: Blood from Arm, Left Updated: 07/27/24 1937    Comprehensive Metabolic Panel [[id number]]  (Abnormal) Collected: 07/27/24 1833    Specimen: Blood Updated: 07/27/24 1905     Glucose 120 mg/dL      BUN 17 mg/dL      Creatinine 0.75 mg/dL      Sodium 138 mmol/L      Potassium 3.8 mmol/L      Chloride 103 mmol/L      CO2 22.0 mmol/L      Calcium 8.9 mg/dL      Total Protein 7.3 g/dL      Albumin 4.3 g/dL      ALT (SGPT) 18 U/L      AST (SGOT) 16 U/L      Alkaline Phosphatase 67 U/L      Total Bilirubin 0.5 mg/dL      Globulin 3.0 gm/dL      A/G Ratio 1.4 g/dL      BUN/Creatinine Ratio 22.7     Anion Gap 13.0 mmol/L      eGFR 90.1 mL/min/1.73     Narrative:      GFR Normal >60  Chronic Kidney Disease <60  Kidney Failure <15      Lactic Acid, Plasma [[id number]]  (Normal) Collected: 07/27/24 1833    Specimen: Blood Updated: 07/27/24 1859     Lactate 2.0 mmol/L     [place] Draw [[id number]] Collected: 07/27/24 1833    Specimen: Blood Updated: 07/27/24 1845    Narrative:      The following orders were created for panel order [place] Draw.  Procedure                               Abnormality         Status                     ---------                               -----------         ------                     Green Top (Gel)[[id number]]                                  Final result               Lavender Top[[id number]]                                     Final result               Gold Top - SST[[id number]]                                   Final result               Light Blue  Top[[id number]]                                   Final result                 Please view results for these tests on the individual orders.    Green Top (Gel) [[id number]] Collected: 07/27/24 1833    Specimen: Blood Updated: 07/27/24 1845     Extra Tube Hold for add-ons.     Comment: Auto resulted.       Lavender Top [[id number]] Collected: 07/27/24 1833    Specimen: Blood Updated: 07/27/24 1845     Extra Tube hold for add-on     Comment: Auto resulted       Gold Top - SST [[id number]] Collected: 07/27/24 1833    Specimen: Blood Updated: 07/27/24 1845     Extra Tube Hold for add-ons.     Comment: Auto resulted.       Light Blue Top [[id number]] Collected: 07/27/24 1833    Specimen: Blood Updated: 07/27/24 1845     Extra Tube Hold for add-ons.     Comment: Auto resulted       CBC & Differential [[id number]]  (Abnormal) Collected: 07/27/24 1833    Specimen: Blood Updated: 07/27/24 1845    Narrative:      The following orders were created for panel order CBC & Differential.  Procedure                               Abnormality         Status                     ---------                               -----------         ------                     CBC Auto Differential[[id number]]        Abnormal            Final result                 Please view results for these tests on the individual orders.    CBC Auto Differential [[id number]]  (Abnormal) Collected: 07/27/24 1833    Specimen: Blood Updated: 07/27/24 1845     WBC 18.02 10*3/mm3      RBC 4.35 10*6/mm3      Hemoglobin 13.2 g/dL      Hematocrit 39.6 %      MCV 91.0 fL      MCH 30.3 pg      MCHC 33.3 g/dL      RDW 12.1 %      RDW-SD 40.2 fl      MPV 8.9 fL      Platelets 267 10*3/mm3      Neutrophil % 92.0 %      Lymphocyte % 4.3 %      Monocyte % 2.8 %      Eosinophil % 0.3 %      Basophil % 0.2 %      Immature Grans % 0.4 %      Neutrophils, Absolute 16.57 10*3/mm3      Lymphocytes, Absolute 0.78 10*3/mm3      Monocytes, Absolute 0.50 10*3/mm3      Eosinophils, Absolute  "0.05 10*3/mm3      Basophils, Absolute 0.04 10*3/mm3      Immature Grans, Absolute 0.08 10*3/mm3      nRBC 0.0 /100 WBC           Data Review:  Results from last 7 days   Lab Units 07/28/24  0535 07/27/24  1833   SODIUM mmol/L 136 138   POTASSIUM mmol/L 3.9 3.8   CHLORIDE mmol/L 105 103   CO2 mmol/L 24.0 22.0   BUN mg/dL 9 17   CREATININE mg/dL 0.67 0.75   GLUCOSE mg/dL 118* 120*   CALCIUM mg/dL 8.0* 8.9     Results from last 7 days   Lab Units 07/28/24  0535 07/27/24  1833   WBC 10*3/mm3 12.72* 18.02*   HEMOGLOBIN g/dL 10.8* 13.2   HEMATOCRIT % 33.2* 39.6   PLATELETS 10*3/mm3 194 267         Results from last 7 days   Lab Units 07/28/24  0536   HEMOGLOBIN A1C % 5.10     No results found for: \"TROPONINT\"      Results from last 7 days   Lab Units 07/27/24  1833   ALK PHOS U/L 67   BILIRUBIN mg/dL 0.5   ALT (SGPT) U/L 18   AST (SGOT) U/L 16         Results from last 7 days   Lab Units 07/28/24  0536   HEMOGLOBIN A1C % 5.10     No results found for: \"POCGLU\"        Past Medical History:   Diagnosis Date    Anxiety     ASCUS of cervix with negative high risk HPV 09/2001    ASCUS of cervix with negative high risk HPV 09/2005    Colon polyp 2013    Depression     Essential hematuria     Gait disturbance     R foot drop    Health care maintenance 06/14/2016    Hyperlipidemia     Hypertension 2020    Irritable bowel syndrome many years ago    Postmenopausal     Vaginal delivery        Assessment:  Active Hospital Problems    Diagnosis  POA    **Sepsis [A41.9]  Yes    Cellulitis of right lower extremity [L03.115]  Yes    History of MRSA infection [Z86.14]  Yes    Hyperlipidemia [E78.5]  Yes      Resolved Hospital Problems   No resolved problems to display.       Plan:  Continue with antibiotics and await results of cultures.  Follow-up on labs.  Zofran for nausea.  Probably needs another day or 2.  Will get MRSA screen.    Billy Ricks MD  7/28/2024  13:55 EDT      Electronically signed by Billy Ricks MD at 07/28/24 " 1515       Jaclyn Cabrera RN at 07/28/24 0633            Problem: Adult Inpatient Plan of Care  Goal: Plan of Care Review  Outcome: Ongoing, Progressing  Flowsheets (Taken 7/28/2024 0631)  Progress: no change  Plan of Care Reviewed With: patient  Outcome Evaluation: Pt A&Ox4 on RA. PRN tylenol admin to treat fever. NS infusing at 100 ml/hr. Meds admin as ordered. Admission documentation and assessment completed, plan of care ongoing.  Goal: Patient-Specific Goal (Individualized)  Outcome: Ongoing, Progressing  Goal: Absence of Hospital-Acquired Illness or Injury  Outcome: Ongoing, Progressing  Intervention: Identify and Manage Fall Risk  Recent Flowsheet Documentation  Taken 7/28/2024 0629 by Jaclyn Cabrera RN  Safety Promotion/Fall Prevention: safety round/check completed  Taken 7/28/2024 0426 by Jaclyn Cabrera RN  Safety Promotion/Fall Prevention: safety round/check completed  Taken 7/28/2024 0238 by Jaclyn Cabrera RN  Safety Promotion/Fall Prevention: safety round/check completed  Taken 7/28/2024 0040 by Jaclyn Cabrera RN  Safety Promotion/Fall Prevention:   activity supervised   assistive device/personal items within reach   clutter free environment maintained   fall prevention program maintained   lighting adjusted   nonskid shoes/slippers when out of bed   room organization consistent   safety round/check completed  Taken 7/27/2024 2209 by Jaclyn Cabrera RN  Safety Promotion/Fall Prevention: safety round/check completed  Taken 7/27/2024 2105 by Jaclyn Cabrera RN  Safety Promotion/Fall Prevention:   activity supervised   assistive device/personal items within reach   clutter free environment maintained   fall prevention program maintained   lighting adjusted   nonskid shoes/slippers when out of bed   room organization consistent   safety round/check completed  Intervention: Prevent Skin Injury  Recent Flowsheet Documentation  Taken 7/28/2024 0629 by Jaclyn Cabrera RN  Body Position: position changed  independently  Taken 7/28/2024 0426 by Jaclyn Cabrera RN  Body Position: position changed independently  Taken 7/28/2024 0238 by Jaclyn Cabrera RN  Body Position: position changed independently  Taken 7/28/2024 0040 by Jaclyn Cabrera RN  Body Position: position changed independently  Taken 7/27/2024 2209 by Jaclyn Cabrera RN  Body Position: position changed independently  Taken 7/27/2024 2105 by aJclyn Cabrera RN  Body Position: position changed independently  Intervention: Prevent and Manage VTE (Venous Thromboembolism) Risk  Recent Flowsheet Documentation  Taken 7/28/2024 0040 by Jaclyn Cabrera RN  Range of Motion: active ROM (range of motion) encouraged  Taken 7/27/2024 2105 by Jaclyn Cabrera RN  VTE Prevention/Management: (Lovenox) other (see comments)  Range of Motion: active ROM (range of motion) encouraged  Intervention: Prevent Infection  Recent Flowsheet Documentation  Taken 7/28/2024 0040 by Jaclyn Cabrera RN  Infection Prevention:   environmental surveillance performed   single patient room provided   rest/sleep promoted  Taken 7/27/2024 2105 by Jaclyn Cabrera RN  Infection Prevention:   environmental surveillance performed   rest/sleep promoted   single patient room provided  Goal: Optimal Comfort and Wellbeing  Outcome: Ongoing, Progressing  Intervention: Provide Person-Centered Care  Recent Flowsheet Documentation  Taken 7/28/2024 0040 by Jaclyn Cabrera RN  Trust Relationship/Rapport:   care explained   choices provided  Taken 7/27/2024 2105 by Jaclyn Cabrera RN  Trust Relationship/Rapport:   care explained   choices provided   questions answered   reassurance provided   thoughts/feelings acknowledged  Goal: Readiness for Transition of Care  Outcome: Ongoing, Progressing  Intervention: Mutually Develop Transition Plan  Recent Flowsheet Documentation  Taken 7/27/2024 2106 by Jaclyn Cabrera RN  Equipment Currently Used at Home: none  Transportation Anticipated: car, drives self  Patient/Family Anticipated Services  at Transition: none  Patient/Family Anticipates Transition to: home     Problem: Behavioral Health Comorbidity  Goal: Maintenance of Behavioral Health Symptom Control  Outcome: Ongoing, Progressing  Intervention: Maintain Behavioral Health Symptom Control  Recent Flowsheet Documentation  Taken 7/28/2024 0629 by Jaclyn Cabrera RN  Medication Review/Management: medications reviewed  Taken 7/28/2024 0426 by Jaclyn Cabrera RN  Medication Review/Management: medications reviewed  Taken 7/28/2024 0238 by Jaclyn Cabrera RN  Medication Review/Management: medications reviewed  Taken 7/28/2024 0040 by Jaclyn Cabrera RN  Medication Review/Management: medications reviewed  Taken 7/27/2024 2209 by Jaclyn Cabrera RN  Medication Review/Management: medications reviewed  Taken 7/27/2024 2105 by Jaclyn Cabrera RN  Medication Review/Management: medications reviewed     Problem: Hypertension Comorbidity  Goal: Blood Pressure in Desired Range  Outcome: Ongoing, Progressing  Intervention: Maintain Blood Pressure Management  Recent Flowsheet Documentation  Taken 7/28/2024 0629 by Jaclyn Cabrera RN  Medication Review/Management: medications reviewed  Taken 7/28/2024 0426 by Jaclyn Cabrera RN  Medication Review/Management: medications reviewed  Taken 7/28/2024 0238 by Jaclyn Cabrera RN  Medication Review/Management: medications reviewed  Taken 7/28/2024 0040 by Jaclyn Cabrera RN  Medication Review/Management: medications reviewed  Taken 7/27/2024 2209 by Jaclyn Cabrera RN  Medication Review/Management: medications reviewed  Taken 7/27/2024 2105 by Jaclyn Cabrera RN  Medication Review/Management: medications reviewed     Problem: Adjustment to Illness (Sepsis/Septic Shock)  Goal: Optimal Coping  Outcome: Ongoing, Progressing     Problem: Bleeding (Sepsis/Septic Shock)  Goal: Absence of Bleeding  Outcome: Ongoing, Progressing     Problem: Glycemic Control Impaired (Sepsis/Septic Shock)  Goal: Blood Glucose Level Within Desired Range  Outcome: Ongoing,  "Progressing     Problem: Infection Progression (Sepsis/Septic Shock)  Goal: Absence of Infection Signs and Symptoms  Outcome: Ongoing, Progressing  Intervention: Initiate Sepsis Management  Recent Flowsheet Documentation  Taken 7/28/2024 0040 by Jaclyn Cabrera RN  Infection Prevention:   environmental surveillance performed   single patient room provided   rest/sleep promoted  Taken 7/27/2024 2105 by Jaclyn Cabrera RN  Infection Prevention:   environmental surveillance performed   rest/sleep promoted   single patient room provided     Problem: Nutrition Impaired (Sepsis/Septic Shock)  Goal: Optimal Nutrition Intake  Outcome: Ongoing, Progressing   Goal Outcome Evaluation:  Plan of Care Reviewed With: patient        Progress: no change  Outcome Evaluation: Pt A&Ox4 on RA. PRN tylenol admin to treat fever. NS infusing at 100 ml/hr. Meds admin as ordered. Admission documentation and assessment completed, plan of care ongoing.                                 Electronically signed by Jaclyn Cabrera RN at 07/28/24 0600       Dylan Taylor [facility] at 07/27/24 2249          [facility] Clinical Pharmacy Services: Vancomycin Pharmacokinetic Initial Consult Note    Brandy El is a 64 y.o. female who is on day 1 of pharmacy to dose vancomycin.    Indication: Skin and Soft Tissue  Consulting Provider: Dr. Zavala  Planned Duration of Therapy: 7 days  Loading Dose Ordered or Given: 1500 mg on 7/27 at 2035  MRSA PCR performed: No  Culture/Source: Bcx in process  Target: -600 mg/L.hr   Pertinent Vanc Dosing History:   Other Antimicrobials: Ceftriaxone    Vitals/Labs  Ht: 160 cm (63\"); Wt: 77.1 kg (170 lb)  Temp Readings from Last 1 Encounters:   07/27/24 99.7 °F (37.6 °C) (Oral)    Estimated Creatinine Clearance: 76.5 mL/min (by C-G formula based on SCr of 0.75 mg/dL).        Results from last 7 days   Lab Units 07/27/24  1833   CREATININE mg/dL 0.75   WBC 10*3/mm3 18.02*     Assessment/Plan:    Vancomycin Dose:   1000 mg " "IV every  12  hours  Predictive AUC level for the dose ordered is 528 mg/L.hr, which is within the target of 400-600 mg/L.hr  Vanc Trough has been ordered for 7/29 at 0730     Pharmacy will follow patient's kidney function and will adjust doses and obtain levels as necessary. Thank you for involving pharmacy in this patient's care. Please contact pharmacy with any questions or concerns.                           Dylan Taylor [facility]  Clinical Pharmacist     Electronically signed by Dylan Taylor [facility] at 07/27/24 2250       Nick Irwin III [facility] at 07/27/24 2027          [facility] Clinical Pharmacy Services: Enoxaparin Consult    Brandy SANCHEZ Nikko has a pharmacy consult to dose prophylactic enoxaparin per Dr. Zavala's request.     Indication: VTE Prophylaxis  Home Anticoagulation: N/A     Relevant clinical data and objective history reviewed:  64 y.o. female 160 cm (63\") 77.1 kg (170 lb)   Body mass index is 30.11 kg/m².   Results from last 7 days   Lab Units 07/27/24  1833   PLATELETS 10*3/mm3 267     Estimated Creatinine Clearance: 76.5 mL/min (by C-G formula based on SCr of 0.75 mg/dL).    Assessment/Plan    Will start patient on 40mg subcutaneous every 24 hours, adjusted for renal function. Consult order will be discontinued but pharmacy will continue to follow.     Nick Irwin III [facility]  Clinical Pharmacist      Electronically signed by Nick Irwin III NAVDEEP at 07/27/24 2027       Clara Aldrich RN at 07/27/24 2019          Attempted to call report.     Electronically signed by Clara Aldrich, RN at 07/27/24 2020       Clara Aldrich, RN at 07/27/24 2002          Nursing report ED to floor  Brandy El  64 y.o.  female    HPI :  HPI (Adult)  Stated Reason for Visit: leg pain warm red    Chief Complaint  Chief Complaint   Patient presents with    Leg Pain       Admitting doctor:   Addis Zavala MD    Admitting diagnosis:   The primary encounter diagnosis was Sepsis without acute organ " "dysfunction, due to unspecified organism. A diagnosis of Cellulitis of right lower leg was also pertinent to this visit.    Code status:   Current Code Status       Date Active Code Status Order ID Comments User Context       7/27/2024 1959 CPR (Attempt to Resuscitate) [id number]  Addis Zavala MD ED        Question Answer    Code Status (Patient has no pulse and is not breathing) CPR (Attempt to Resuscitate)    Medical Interventions (Patient has pulse or is breathing) Full Support    Level Of Support Discussed With Patient                    Allergies:   Phenergan [promethazine hcl]    Isolation:   No active isolations    Intake and Output  No intake or output data in the 24 hours ending 07/27/24 2002    Weight:       07/27/24 1853   Weight: 77.1 kg (170 lb)       Most recent vitals:   Vitals:    07/27/24 1853 07/27/24 1859 07/27/24 1934 07/27/24 1949   BP:  173/84 146/85    Pulse:  104 101 97   Resp:    16   Temp:       SpO2:  96% 94% 96%   Weight: 77.1 kg (170 lb)      Height: 160 cm (63\")          Active LDAs/IV Access:   Lines, Drains & Airways       Active LDAs       Name Placement date Placement time Site Days    Peripheral IV 07/27/24 1834 Left Antecubital 07/27/24 1834  Antecubital  less than 1                    Labs (abnormal labs have a star):   Labs Reviewed   COMPREHENSIVE METABOLIC PANEL - Abnormal; Notable for the following components:       Result Value    Glucose 120 (*)     All other components within normal limits    Narrative:     GFR Normal >60  Chronic Kidney Disease <60  Kidney Failure <15     CBC WITH AUTO DIFFERENTIAL - Abnormal; Notable for the following components:    WBC 18.02 (*)     RDW 12.1 (*)     Neutrophil % 92.0 (*)     Lymphocyte % 4.3 (*)     Monocyte % 2.8 (*)     Neutrophils, Absolute 16.57 (*)     Immature Grans, Absolute 0.08 (*)     All other components within normal limits   LACTIC ACID, PLASMA - Normal   BLOOD CULTURE   BLOOD CULTURE   RAINBOW DRAW    Narrative:  "    The following orders were created for panel order [place] Draw.  Procedure                               Abnormality         Status                     ---------                               -----------         ------                     Green Top (Gel)[[id number]]                                  Final result               Lavender Top[[id number]]                                     Final result               Gold Top - SST[[id number]]                                   Final result               Light Blue Top[[id number]]                                   Final result                 Please view results for these tests on the individual orders.   CBC AND DIFFERENTIAL    Narrative:     The following orders were created for panel order CBC & Differential.  Procedure                               Abnormality         Status                     ---------                               -----------         ------                     CBC Auto Differential[[id number]]        Abnormal            Final result                 Please view results for these tests on the individual orders.   GREEN TOP   LAVENDER TOP   GOLD TOP - SST   LIGHT BLUE TOP       EKG:   No orders to display       Meds given in ED:   Medications   lactated ringers bolus 1,000 mL (1,000 mL Intravenous New Bag 7/27/24 1948)   vancomycin IVPB 1500 mg in 0.9% NaCl (Premix) 500 mL (has no administration in time range)   cefTRIAXone (ROCEPHIN) 1,000 mg in sodium chloride 0.9 % 100 mL MBP (1,000 mg Intravenous New Bag 7/27/24 1945)   sodium chloride 0.9 % flush 10 mL (has no administration in time range)   sodium chloride 0.9 % flush 10 mL (has no administration in time range)   sodium chloride 0.9 % infusion 40 mL (has no administration in time range)   nitroglycerin (NITROSTAT) SL tablet 0.4 mg (has no administration in time range)   Potassium Replacement - Follow Nurse / BPA Driven Protocol (has no administration in time range)   Magnesium Standard Dose  Replacement - Follow Nurse / BPA Driven Protocol (has no administration in time range)   Phosphorus Replacement - Follow Nurse / BPA Driven Protocol (has no administration in time range)   Calcium Replacement - Follow Nurse / BPA Driven Protocol (has no administration in time range)   sennosides-docusate (PERICOLACE) 8.6-50 MG per tablet 2 tablet (has no administration in time range)     And   polyethylene glycol (MIRALAX) packet 17 g (has no administration in time range)     And   bisacodyl (DULCOLAX) EC tablet 5 mg (has no administration in time range)     And   bisacodyl (DULCOLAX) suppository 10 mg (has no administration in time range)   clonazePAM (KlonoPIN) tablet 0.5 mg (has no administration in time range)   acetaminophen (TYLENOL) tablet 1,000 mg (1,000 mg Oral Given 7/27/24 1938)   ondansetron (ZOFRAN) injection 4 mg (4 mg Intravenous Given 7/27/24 1944)       Imaging results:  No radiology results for the last day    Ambulatory status:   - ambulates independently.     Social issues:   Social History     Socioeconomic History    Marital status:    Tobacco Use    Smoking status: Never    Smokeless tobacco: Never   Vaping Use    Vaping status: Never Used   Substance and Sexual Activity    Alcohol use: Not Currently     Alcohol/week: 2.0 standard drinks of alcohol     Types: 2 Shots of liquor per week     Comment: 2 x weekly    Drug use: No    Sexual activity: Not Currently     Partners: Male     Birth control/protection: Post-menopausal       Peripheral Neurovascular  Peripheral Neurovascular (Adult)  Peripheral Neurovascular WDL: .WDL except, neurovascular assessment lower  RLE Neurovascular Assessment  Temperature RLE: hot  Color RLE: red  Sensation RLE: tenderness present    Neuro Cognitive  Neuro Cognitive (Adult)  Cognitive/Neuro/Behavioral WDL: WDL    Learning  Learning Assessment (Adult)  Learning Readiness and Ability: no barriers identified    Respiratory  Respiratory WDL  Respiratory WDL:  WDL    Abdominal Pain       Pain Assessments  Pain (Adult)  (0-10) Pain Rating: Rest: 6    NIH Stroke Scale       Clara Aldrich RN  07/27/24 20:02 EDT      Electronically signed by Clara Aldrich RN at 07/27/24 2003       Addis Zavala MD at 07/27/24 1941              Patient Name:  Brandy El  YOB: 1960  MRN:  [id number]  Admit Date:  7/27/2024  Patient Care Team:  Tiffanie Woodard MD as PCP - General  Tiffanie Woodard MD as PCP - Family Medicine      Subjective  History Present Illness     Chief Complaint   Patient presents with    Leg Pain         History of Present Illness  Patient is a 64-year-old female with a history of hypertension, hyperlipidemia, anxiety depression, MRSA infection, history of right lower extremity cellulitis, presented to the ED complaining of pain, worsening redness and swelling and pain in right lower extremity.  Patient reports symptoms noted.  Approximately 2 weeks ago, she was seen at urgent care, was diagnosed with cellulitis, and prescribed Keflex for 7 days.  She did not improvement of antibiotics however symptoms did not completely resolve, and she continued to have redness.  Noticed worsening swelling, pain, erythema today and developed shaking chills and fever up to 100 F and decided to present to the ED.  Denies any known injury.  Did have right lower extremity cellulitis approximately 1 year ago.  Does have history of MRSA infection in her pthumb    Review of Systems     GENERAL: + fevers, chills, sweats.    RESPIRATORY: No cough, shortness of breath, hemoptysis or wheezing.   CVS: No chest pain, palpitations, orthopnea, dyspnea on exertion   GI: No melena or hematochezia. No abdominal pain. No nausea, vomiting, constipation, diarrhea  Extremities:  +Right lower extremity pain,swelling, redness      Personal History     Past Medical History:   Diagnosis Date    Anxiety     ASCUS of cervix with negative high risk HPV 09/2001    ASCUS of cervix with  negative high risk HPV 09/2005    Colon polyp 2013    Depression     Essential hematuria     Gait disturbance     R foot drop    Health care maintenance 06/14/2016    Hyperlipidemia     Hypertension 2020    Irritable bowel syndrome many years ago    Postmenopausal     Vaginal delivery      Past Surgical History:   Procedure Laterality Date    ANKLE SURGERY      BACK SURGERY      COLONOSCOPY  10/22/2013    tics, IH, HP    COLONOSCOPY N/A 12/03/2018    Procedure: COLONOSCOPY TO CECUM AND INTO TERMINAL ILEUM WITH COLD BIOPSIES AND COLD BIOPSY POLYPECTOMY;  Surgeon: Syd Emerson MD;  Location:  [facility];  Service: Gastroenterology    ENDOSCOPY N/A 12/03/2018    Procedure: ESOPHAGOGASTRODUODENOSCOPY WITH COLD BIOPSIES;  Surgeon: Syd Emerson MD;  Location:  [facility];  Service: Gastroenterology    LAPAROSCOPIC TUBAL LIGATION Bilateral     TEAR DUCT SURGERY  06/2017    TUBAL ABDOMINAL LIGATION       Family History   Problem Relation Age of Onset    COPD Mother     Heart disease Father     Heart disease Brother     Cancer Brother     COPD Brother     Breast cancer Sister     No Known Problems Daughter     No Known Problems Son     No Known Problems Maternal Grandmother     No Known Problems Paternal Grandmother     No Known Problems Maternal Aunt     No Known Problems Paternal Aunt     BRCA 1/2 Neg Hx     Colon cancer Neg Hx     Endometrial cancer Neg Hx     Ovarian cancer Neg Hx      Social History     Tobacco Use    Smoking status: Never    Smokeless tobacco: Never   Vaping Use    Vaping status: Never Used   Substance Use Topics    Alcohol use: Not Currently     Alcohol/week: 2.0 standard drinks of alcohol     Types: 2 Shots of liquor per week     Comment: 2 x weekly    Drug use: No     No current facility-administered medications on file prior to encounter.     Current Outpatient Medications on File Prior to Encounter   Medication Sig Dispense Refill    amLODIPine (NORVASC) 2.5 MG tablet Take 1 tablet by  mouth Daily. 90 tablet 1    atorvastatin (LIPITOR) 20 MG tablet TAKE 1 TABLET BY MOUTH DAILY 90 tablet 1    buPROPion XL (WELLBUTRIN XL) 150 MG 24 hr tablet Take 3 tablets by mouth Every Morning.      buPROPion XL (WELLBUTRIN XL) 300 MG 24 hr tablet       cetirizine (zyrTEC) 10 MG tablet Take 1 tablet by mouth Daily for 30 days. 30 tablet 0    clonazePAM (KlonoPIN) 0.5 MG tablet Take 1 tablet by mouth 2 (Two) Times a Day As Needed for Anxiety. 30 tablet 2    clonazePAM (KlonoPIN) 1 MG tablet       escitalopram (LEXAPRO) 10 MG tablet       fluticasone (FLONASE) 50 MCG/ACT nasal spray 2 sprays into the nostril(s) as directed by provider Daily. 16 g 3    omega-3 acid ethyl esters (LOVAZA) 1 g capsule TAKE 2 CAPSULES BY MOUTH TWICE A  capsule 3    propranolol LA (INDERAL LA) 60 MG 24 hr capsule TAKE 1 CAPSULE BY MOUTH DAILY 30 capsule 4    traZODone (DESYREL) 50 MG tablet       valsartan (DIOVAN) 320 MG tablet TAKE ONE TABLET BY MOUTH DAILY 90 tablet 3     Allergies   Allergen Reactions    Phenergan [Promethazine Hcl] Anxiety       Objective   Objective     Vital Signs  Temp:  [102.2 °F (39 °C)] 102.2 °F (39 °C)  Heart Rate:  [110] 110  Resp:  [16] 16  BP: (164)/(92) 164/92  SpO2:  [93 %] 93 %  on   ;      Body mass index is 30.11 kg/m².    Physical Exam  General: Alert and oriented x3, no acute distress.  HEENT: Normocephalic, atraumatic  Eyes: PERRL, EOMI, anicteric sclerae  Lungs: Clear to auscultation bilaterally, no crackles or wheezes  CV: Regular rate and rhythm, no murmurs rubs or gallops  Abdomen: Soft, nontender, nondistended.  Normoactive bowel sounds  Extremities: Right lower extremity edema, +erythema below knee primarily in anterior and medial leg,, tenderness to palpation.  No significant left lower extremity edema.  Skin: Clean/dry/intact, no rashes  Neuro: Cranial nerves II through XII intact, no gross focal neurological deficits appreciated  Psych: Appropriate mood and affect    Results  Review:  I reviewed the patient's new clinical results.  I reviewed the patient's new imaging results and agree with the interpretation.  I reviewed the patient's other test results and agree with the interpretation  I personally viewed and interpreted the patient's EKG/Telemetry data  Discussed with ED provider.    Lab Results (last 24 hours)       Procedure Component Value Units Date/Time    CBC & Differential [[id number]]  (Abnormal) Collected: 07/27/24 1833    Specimen: Blood Updated: 07/27/24 1845    Narrative:      The following orders were created for panel order CBC & Differential.  Procedure                               Abnormality         Status                     ---------                               -----------         ------                     CBC Auto Differential[[id number]]        Abnormal            Final result                 Please view results for these tests on the individual orders.    Comprehensive Metabolic Panel [[id number]]  (Abnormal) Collected: 07/27/24 1833    Specimen: Blood Updated: 07/27/24 1905     Glucose 120 mg/dL      BUN 17 mg/dL      Creatinine 0.75 mg/dL      Sodium 138 mmol/L      Potassium 3.8 mmol/L      Chloride 103 mmol/L      CO2 22.0 mmol/L      Calcium 8.9 mg/dL      Total Protein 7.3 g/dL      Albumin 4.3 g/dL      ALT (SGPT) 18 U/L      AST (SGOT) 16 U/L      Alkaline Phosphatase 67 U/L      Total Bilirubin 0.5 mg/dL      Globulin 3.0 gm/dL      A/G Ratio 1.4 g/dL      BUN/Creatinine Ratio 22.7     Anion Gap 13.0 mmol/L      eGFR 90.1 mL/min/1.73     Narrative:      GFR Normal >60  Chronic Kidney Disease <60  Kidney Failure <15      Lactic Acid, Plasma [[id number]]  (Normal) Collected: 07/27/24 1833    Specimen: Blood Updated: 07/27/24 1859     Lactate 2.0 mmol/L     CBC Auto Differential [[id number]]  (Abnormal) Collected: 07/27/24 1833    Specimen: Blood Updated: 07/27/24 1845     WBC 18.02 10*3/mm3      RBC 4.35 10*6/mm3      Hemoglobin 13.2 g/dL      Hematocrit  39.6 %      MCV 91.0 fL      MCH 30.3 pg      MCHC 33.3 g/dL      RDW 12.1 %      RDW-SD 40.2 fl      MPV 8.9 fL      Platelets 267 10*3/mm3      Neutrophil % 92.0 %      Lymphocyte % 4.3 %      Monocyte % 2.8 %      Eosinophil % 0.3 %      Basophil % 0.2 %      Immature Grans % 0.4 %      Neutrophils, Absolute 16.57 10*3/mm3      Lymphocytes, Absolute 0.78 10*3/mm3      Monocytes, Absolute 0.50 10*3/mm3      Eosinophils, Absolute 0.05 10*3/mm3      Basophils, Absolute 0.04 10*3/mm3      Immature Grans, Absolute 0.08 10*3/mm3      nRBC 0.0 /100 WBC     Blood Culture - Blood, Arm, Left [[id number]] Collected: 07/27/24 1931    Specimen: Blood from Arm, Left Updated: 07/27/24 1937    Blood Culture - Blood, Arm, Right [[id number]] Collected: 07/27/24 1931    Specimen: Blood from Arm, Right Updated: 07/27/24 1937            Imaging Results (Last 24 Hours)       ** No results found for the last 24 hours. **            Results for orders placed in visit on 06/16/15    SCANNED - ECHOCARDIOGRAM      No orders to display        Assessment/Plan     Active Hospital Problems    Diagnosis  POA    **Sepsis [A41.9]  Yes    Cellulitis of right lower extremity [L03.115]  Yes    History of MRSA infection [Z86.14]  Yes    Hyperlipidemia [E78.5]  Yes      Resolved Hospital Problems   No resolved problems to display.   Patient is a 64-year-old female with a history of hypertension, hyperlipidemia, anxiety depression, MRSA infection, history of right lower extremity cellulitis, presented to the ED complaining of pain, worsening redness and swelling and pain in right lower extremity        Sepsis secondary to right lower extremity cellulitis  -Initially diagnosed with right lower send cellulitis approximately 2 weeks ago, was initiated on Keflex for 7 days, did have improvement of antibiotics however erythema has not completely resolved per patient.  Symptoms worsened prior to admission.  -WBC elevated at 18.02, patient febrile up to 102.2  on admission, tachycardic  -Received IV ceftriaxone and vancomycin in the ED, continue  -Blood cultures pending  -Hemodynamics stable, BP on the higher side  -Duplex ultrasound of right lower extremity ordered  -Pain management  -IV fluids bolus given in the ED, initiated on normal saline 100 cc an hour      Essential hypertension  -Patient with sepsis as above, however BP stable, on the higher side  -Resume propranolol, on propranolol ER 60 mg 24 hours outpatient, initiated on 30 mg every 12 hours inpatient starting tomorrow   -Patient amlodipine 2.5 mg daily starting tomorrow  -Hold valsartan for now in the setting of sepsis, monitor BP and resume as indicated.      Anxiety/depression  -Resume bupropion, citalopram, as needed clonazepam      Hyperglycemia  -Hemoglobin A1c ordered        I discussed the patient's findings and my recommendations with patient, nursing staff, and ED provider.    VTE Prophylaxis - Lovenox 40 mg SC daily.  Code Status - Full code.       Addis Zavala MD  [facility]  07/27/24  19:43 EDT      Electronically signed by Addis Zavala MD at 07/27/24 2019       Waldemar Shin MD at 07/27/24 1911          MD ATTESTATION NOTE  I supervised care provided by the midlevel provider. We have discussed this patient's history, physical exam, and treatment plan. I have reviewed the midlevel provider's note and I agree with the midlevel provider's findings and plan of care.   SHARED VISIT: This visit was performed by BOTH a physician and an APC. The substantive portion of the medical decision making was performed by this attesting physician who made or approved the management plan and takes responsibility for patient management. All studies in the APC note (if performed) were independently interpreted by me.   I have personally had a face to face encounter with the patient.     PCP: Tiffanie Woodard MD  Patient Care Team:  Tiffanie Woodard MD as PCP -  Tiffanie Noel MD as PCP - Family Medicine     Brandy El is a 64 y.o. female who presents to the ED c/o redness of right leg and fever.  Patient reports that she was diagnosed with cellulitis 2 weeks ago, prescribed antibiotics, completed, leg continued to be red, increased redness and swelling the last several days, fever today.    On exam:  General: NAD.  Head: NCAT.  ENT: nares patent, no scleral icterus  Neck: Supple, trachea midline.  Cardiac: regular rate and rhythm.  Lungs: normal effort, clear to auscultation bilaterally  Abdomen: Soft, nondistended, NTTP, no rebound tenderness, no guarding or rigidity.   Extremities: Moves all extremities well, no peripheral edema  Neuro: alert, MAEW, follows commands  Psych: calm, cooperative  Skin: Warm, dry.  Erythema with warmth and induration of right anterior leg, no crepitus or subcutaneous emphysema, no pain beyond the border of erythema.    Medical Decision Making:  After the initial H&P, I discussed pertinent information from history and physical exam with patient/family.  Discussed differential diagnosis.  Discussed plan for ED evaluation/work-up/treatment.  All questions answered.  Patient/family is agreeable with plan.    ED Course as of 07/28/24 0012   Sat Jul 27, 2024 1855 WBC(!): 18.02 [KA]   1855 Hemoglobin: 13.2 [KA]   1908 Lactate: 2.0 [KA]   1908 Glucose(!): 120 [KA]   1908 Creatinine: 0.75 [KA]   1913 The patient is failed outpatient treat with antibiotics.  Due to history of MRSA covered with Rocephin and vancomycin.  IV fluids ordered, Tylenol given for pain and fever.  She declined anything stronger but will let us know if that changes.  Recommended admission for inpatient treatment and she is agreeable. [KA]   1922 I discussed patient with Dr. Zavala, hospitalist, including history presentation workup and he agrees to admit for further evaluation and treatment. [KA]      ED Course User Index  [KA] Clara Thorne PA-C        Diagnosis  Final diagnoses:   Sepsis without acute organ dysfunction, due to unspecified organism   Cellulitis of right lower leg          Waldemar Shin MD  07/27/24 2002       Waldemar Shin MD  07/28/24 0012      Electronically signed by Waldemar Shin MD at 07/28/24 0012       Clara Thorne PA-C at 07/27/24 1902       Attestation signed by Waldemar Shin MD at 07/27/24 8206    I supervised care provided by the midlevel provider. We have discussed this patient's history, physical exam, and treatment plan. I have reviewed the midlevel provider's note and I agree with the midlevel provider's findings and plan of care.   SHARED VISIT: This visit was performed by BOTH a physician and an ALBA. The substantive portion of the medical decision making was performed by this attesting physician who made or approved the management plan and takes responsibility for patient management. All studies in the ALBA note (if performed) were independently interpreted by me.                        EMERGENCY DEPARTMENT ENCOUNTER  Room Number:  06/06  PCP: Tiffanie Woodard MD  Independent Historians: Patient      HPI:  Chief Complaint: had concerns including Leg Pain.       A complete HPI/ROS/PMH/PSH/SH/FH are unobtainable due to: None    Chronic or social conditions impacting patient care (Social Determinants of Health): None      Context: Brandy El is a 64 y.o. female with a medical history of cellulitis, MRSA, hyperlipidemia who presents to the ED c/o acute pain redness and swelling in the right lower leg associated with fever.  She states her symptoms started more than 2 weeks ago, she was seen at urgent care and prescribed a course of antibiotics.  It did improve, but did not fully resolve.  She completed the antibiotics 5 days ago.  It has gotten more red and swollen again and today she developed shaking chills and had a fever at home of 100.  Temp 102.2 upon arrival.  She denies nausea vomiting, is not  diabetic.  Denies any upper respiratory symptoms or other complaints at this time.  She states she has recurrent cellulitis in this leg previously.      Review of prior external notes (non-ED) -and- Review of prior external test results outside of this encounter:  Urgent care evaluation On 7/15/2024 for right lower leg pain swelling and redness, also had had an exposure to strep.  Strep was negative, she was prescribed Keflex 500 mg 4 times daily x 7 days.        PAST MEDICAL HISTORY  Active Ambulatory Problems     Diagnosis Date Noted    Health care maintenance 06/14/2016    Hyperlipidemia 06/14/2016    History of MRSA infection 06/27/2017    Dyspepsia 10/17/2018    Change in bowel habits 10/17/2018    FH: esophageal cancer 10/17/2018    Anxiety 09/16/2022    Cellulitis of right lower extremity 06/22/2023     Resolved Ambulatory Problems     Diagnosis Date Noted    No Resolved Ambulatory Problems     Past Medical History:   Diagnosis Date    ASCUS of cervix with negative high risk HPV 09/2001    ASCUS of cervix with negative high risk HPV 09/2005    Colon polyp 2013    Depression     Essential hematuria     Gait disturbance     Hypertension 2020    Irritable bowel syndrome many years ago    Postmenopausal     Vaginal delivery          PAST SURGICAL HISTORY  Past Surgical History:   Procedure Laterality Date    ANKLE SURGERY      BACK SURGERY      COLONOSCOPY  10/22/2013    tics, IH, HP    COLONOSCOPY N/A 12/03/2018    Procedure: COLONOSCOPY TO CECUM AND INTO TERMINAL ILEUM WITH COLD BIOPSIES AND COLD BIOPSY POLYPECTOMY;  Surgeon: Syd Emerson MD;  Location: [facility];  Service: Gastroenterology    ENDOSCOPY N/A 12/03/2018    Procedure: ESOPHAGOGASTRODUODENOSCOPY WITH COLD BIOPSIES;  Surgeon: Syd Emerson MD;  Location: [facility];  Service: Gastroenterology    LAPAROSCOPIC TUBAL LIGATION Bilateral     TEAR DUCT SURGERY  06/2017    TUBAL ABDOMINAL LIGATION           FAMILY HISTORY  Family History    Problem Relation Age of Onset    COPD Mother     Heart disease Father     Heart disease Brother     Cancer Brother     COPD Brother     Breast cancer Sister     No Known Problems Daughter     No Known Problems Son     No Known Problems Maternal Grandmother     No Known Problems Paternal Grandmother     No Known Problems Maternal Aunt     No Known Problems Paternal Aunt     BRCA 1/2 Neg Hx     Colon cancer Neg Hx     Endometrial cancer Neg Hx     Ovarian cancer Neg Hx          SOCIAL HISTORY  Social History     Socioeconomic History    Marital status:    Tobacco Use    Smoking status: Never    Smokeless tobacco: Never   Vaping Use    Vaping status: Never Used   Substance and Sexual Activity    Alcohol use: Not Currently     Alcohol/week: 2.0 standard drinks of alcohol     Types: 2 Shots of liquor per week     Comment: 2 x weekly    Drug use: No    Sexual activity: Not Currently     Partners: Male     Birth control/protection: Post-menopausal         ALLERGIES  Phenergan [promethazine hcl]      REVIEW OF SYSTEMS  Review of Systems  Included in HPI  All systems reviewed and negative except for those discussed in HPI.      PHYSICAL EXAM    I have reviewed the triage vital signs and nursing notes.    ED Triage Vitals   Temp Heart Rate Resp BP SpO2   07/27/24 1820 07/27/24 1820 07/27/24 1820 07/27/24 1823 07/27/24 1820   (!) 102.2 °F (39 °C) 110 16 164/92 93 %      Temp src Heart Rate Source Patient Position BP Location FiO2 (%)   -- -- -- -- --              Physical Exam  GENERAL: alert, no acute distress  SKIN: Warm, dry  HENT: Normocephalic, atraumatic  EYES: no scleral icterus  CV: regular rhythm, regular rate  RESPIRATORY: normal effort, lungs clear  ABDOMEN: nondistended  MUSCULOSKELETAL: no deformity.  There is erythema edema and tenderness to the anterior aspect of the right lower leg.  DP pulses 2+, cap refill brisk, calf is nontender, no crepitus  NEURO: alert, moves all extremities, follows  commands            LAB RESULTS  Recent Results (from the past 24 hour(s))   Comprehensive Metabolic Panel    Collection Time: 07/27/24  6:33 PM    Specimen: Blood   Result Value Ref Range    Glucose 120 (H) 65 - 99 mg/dL    BUN 17 8 - 23 mg/dL    Creatinine 0.75 0.57 - 1.00 mg/dL    Sodium 138 136 - 145 mmol/L    Potassium 3.8 3.5 - 5.2 mmol/L    Chloride 103 98 - 107 mmol/L    CO2 22.0 22.0 - 29.0 mmol/L    Calcium 8.9 8.6 - 10.5 mg/dL    Total Protein 7.3 6.0 - 8.5 g/dL    Albumin 4.3 3.5 - 5.2 g/dL    ALT (SGPT) 18 1 - 33 U/L    AST (SGOT) 16 1 - 32 U/L    Alkaline Phosphatase 67 39 - 117 U/L    Total Bilirubin 0.5 0.0 - 1.2 mg/dL    Globulin 3.0 gm/dL    A/G Ratio 1.4 g/dL    BUN/Creatinine Ratio 22.7 7.0 - 25.0    Anion Gap 13.0 5.0 - 15.0 mmol/L    eGFR 90.1 >60.0 mL/min/1.73   Lactic Acid, Plasma    Collection Time: 07/27/24  6:33 PM    Specimen: Blood   Result Value Ref Range    Lactate 2.0 0.5 - 2.0 mmol/L   CBC Auto Differential    Collection Time: 07/27/24  6:33 PM    Specimen: Blood   Result Value Ref Range    WBC 18.02 (H) 3.40 - 10.80 10*3/mm3    RBC 4.35 3.77 - 5.28 10*6/mm3    Hemoglobin 13.2 12.0 - 15.9 g/dL    Hematocrit 39.6 34.0 - 46.6 %    MCV 91.0 79.0 - 97.0 fL    MCH 30.3 26.6 - 33.0 pg    MCHC 33.3 31.5 - 35.7 g/dL    RDW 12.1 (L) 12.3 - 15.4 %    RDW-SD 40.2 37.0 - 54.0 fl    MPV 8.9 6.0 - 12.0 fL    Platelets 267 140 - 450 10*3/mm3    Neutrophil % 92.0 (H) 42.7 - 76.0 %    Lymphocyte % 4.3 (L) 19.6 - 45.3 %    Monocyte % 2.8 (L) 5.0 - 12.0 %    Eosinophil % 0.3 0.3 - 6.2 %    Basophil % 0.2 0.0 - 1.5 %    Immature Grans % 0.4 0.0 - 0.5 %    Neutrophils, Absolute 16.57 (H) 1.70 - 7.00 10*3/mm3    Lymphocytes, Absolute 0.78 0.70 - 3.10 10*3/mm3    Monocytes, Absolute 0.50 0.10 - 0.90 10*3/mm3    Eosinophils, Absolute 0.05 0.00 - 0.40 10*3/mm3    Basophils, Absolute 0.04 0.00 - 0.20 10*3/mm3    Immature Grans, Absolute 0.08 (H) 0.00 - 0.05 10*3/mm3    nRBC 0.0 0.0 - 0.2 /100 WBC   Green Top  (Gel)    Collection Time: 07/27/24  6:33 PM   Result Value Ref Range    Extra Tube Hold for add-ons.    Lavender Top    Collection Time: 07/27/24  6:33 PM   Result Value Ref Range    Extra Tube hold for add-on    Gold Top - SST    Collection Time: 07/27/24  6:33 PM   Result Value Ref Range    Extra Tube Hold for add-ons.    Light Blue Top    Collection Time: 07/27/24  6:33 PM   Result Value Ref Range    Extra Tube Hold for add-ons.          RADIOLOGY  No Radiology Exams Resulted Within Past 24 Hours      MEDICATIONS GIVEN IN ER  Medications   lactated ringers bolus 1,000 mL (1,000 mL Intravenous New Bag 7/27/24 1948)   vancomycin IVPB 1500 mg in 0.9% NaCl (Premix) 500 mL (has no administration in time range)   cefTRIAXone (ROCEPHIN) 1,000 mg in sodium chloride 0.9 % 100 mL MBP (1,000 mg Intravenous New Bag 7/27/24 1945)   sodium chloride 0.9 % flush 10 mL (has no administration in time range)   sodium chloride 0.9 % flush 10 mL (has no administration in time range)   sodium chloride 0.9 % infusion 40 mL (has no administration in time range)   nitroglycerin (NITROSTAT) SL tablet 0.4 mg (has no administration in time range)   Potassium Replacement - Follow Nurse / BPA Driven Protocol (has no administration in time range)   Magnesium Standard Dose Replacement - Follow Nurse / BPA Driven Protocol (has no administration in time range)   Phosphorus Replacement - Follow Nurse / BPA Driven Protocol (has no administration in time range)   Calcium Replacement - Follow Nurse / BPA Driven Protocol (has no administration in time range)   sennosides-docusate (PERICOLACE) 8.6-50 MG per tablet 2 tablet (has no administration in time range)     And   polyethylene glycol (MIRALAX) packet 17 g (has no administration in time range)     And   bisacodyl (DULCOLAX) EC tablet 5 mg (has no administration in time range)     And   bisacodyl (DULCOLAX) suppository 10 mg (has no administration in time range)   clonazePAM (KlonoPIN) tablet 0.5  mg (has no administration in time range)   acetaminophen (TYLENOL) tablet 1,000 mg (1,000 mg Oral Given 7/27/24 1938)   ondansetron (ZOFRAN) injection 4 mg (4 mg Intravenous Given 7/27/24 1944)         ORDERS PLACED DURING THIS VISIT:  Orders Placed This Encounter   Procedures    Blood Culture - Blood,    Blood Culture - Blood,    Comprehensive Metabolic Panel    [place] Draw    Lactic Acid, Plasma    CBC Auto Differential    Hemoglobin A1c    Basic Metabolic Panel    CBC (No Diff)    Magnesium    Phosphorus    Diet: Regular/House; Fluid Consistency: Thin (IDDSI 0)    Vital Signs    Notify Physician (With Default Parameters)    Up with assistance    Intake & Output    Weigh patient    Daily Weights    Oral Care    Maintain IV Access    Telemetry - Place Orders & Notify Provider of Results When Patient Experiences Acute Chest Pain, Dysrhythmia or Respiratory Distress    May Be Off Telemetry for Tests    Code Status and Medical Interventions: CPR (Attempt to Resuscitate); Full Support    [facility] (on-call MD unless specified) Details    Incentive Spirometry    Insert Peripheral IV    Inpatient Admission    CBC & Differential    Green Top (Gel)    Lavender Top    Gold Top - SST    Light Blue Top         OUTPATIENT MEDICATION MANAGEMENT:  Current Facility-Administered Medications Ordered in Epic   Medication Dose Route Frequency Provider Last Rate Last Admin    sennosides-docusate (PERICOLACE) 8.6-50 MG per tablet 2 tablet  2 tablet Oral BID PRN Addis Zavala MD        And    polyethylene glycol (MIRALAX) packet 17 g  17 g Oral Daily PRN Addis Zavala MD        And    bisacodyl (DULCOLAX) EC tablet 5 mg  5 mg Oral Daily PRN Addis Zavala MD        And    bisacodyl (DULCOLAX) suppository 10 mg  10 mg Rectal Daily PRN Addis Zavala MD        Calcium Replacement - Follow Nurse / BPA Driven Protocol   Does not apply PRN Addis Zavala MD        cefTRIAXone (ROCEPHIN) 1,000 mg in sodium chloride  0.9 % 100 mL MBP  1,000 mg Intravenous Once Clara Thorne PA-C 200 mL/hr at 07/27/24 1945 1,000 mg at 07/27/24 1945    clonazePAM (KlonoPIN) tablet 0.5 mg  0.5 mg Oral BID PRN Addis Zavala MD        lactated ringers bolus 1,000 mL  1,000 mL Intravenous Once Clara Thorne PA-C 2,000 mL/hr at 07/27/24 1948 1,000 mL at 07/27/24 1948    Magnesium Standard Dose Replacement - Follow Nurse / BPA Driven Protocol   Does not apply PRN Addis Zavala MD        nitroglycerin (NITROSTAT) SL tablet 0.4 mg  0.4 mg Sublingual Q5 Min PRN Addis Zavala MD        Phosphorus Replacement - Follow Nurse / BPA Driven Protocol   Does not apply PRN Addis Zavala MD        Potassium Replacement - Follow Nurse / BPA Driven Protocol   Does not apply PRN Addis Zavala MD        sodium chloride 0.9 % flush 10 mL  10 mL Intravenous Q12H Addis Zavala MD        sodium chloride 0.9 % flush 10 mL  10 mL Intravenous PRN Addis Zavala MD        sodium chloride 0.9 % infusion 40 mL  40 mL Intravenous PRN Addis Zavala MD        vancomycin IVPB 1500 mg in 0.9% NaCl (Premix) 500 mL  20 mg/kg Intravenous Once Clara Thorne PA-C         Current Outpatient Medications Ordered in Epic   Medication Sig Dispense Refill    amLODIPine (NORVASC) 2.5 MG tablet Take 1 tablet by mouth Daily. 90 tablet 1    atorvastatin (LIPITOR) 20 MG tablet TAKE 1 TABLET BY MOUTH DAILY 90 tablet 1    buPROPion XL (WELLBUTRIN XL) 150 MG 24 hr tablet Take 3 tablets by mouth Every Morning.      buPROPion XL (WELLBUTRIN XL) 300 MG 24 hr tablet       cetirizine (zyrTEC) 10 MG tablet Take 1 tablet by mouth Daily for 30 days. 30 tablet 0    clonazePAM (KlonoPIN) 0.5 MG tablet Take 1 tablet by mouth 2 (Two) Times a Day As Needed for Anxiety. 30 tablet 2    clonazePAM (KlonoPIN) 1 MG tablet       escitalopram (LEXAPRO) 10 MG tablet       fluticasone (FLONASE) 50 MCG/ACT nasal spray 2 sprays into the nostril(s) as directed by  provider Daily. 16 g 3    omega-3 acid ethyl esters (LOVAZA) 1 g capsule TAKE 2 CAPSULES BY MOUTH TWICE A  capsule 3    propranolol LA (INDERAL LA) 60 MG 24 hr capsule TAKE 1 CAPSULE BY MOUTH DAILY 30 capsule 4    traZODone (DESYREL) 50 MG tablet       valsartan (DIOVAN) 320 MG tablet TAKE ONE TABLET BY MOUTH DAILY 90 tablet 3         PROCEDURES  Procedures            PROGRESS, DATA ANALYSIS, CONSULTS, AND MEDICAL DECISION MAKING  All labs have been independently interpreted by me.  All radiology studies have been reviewed by me. All EKG's have been independently viewed and interpreted by me.  Discussion below represents my analysis of pertinent findings related to patient's condition, differential diagnosis, treatment plan and final disposition.    DIFFERENTIAL  Differential diagnosis includes was not limited to sepsis, cellulitis, DVT, erysipelas    Clinical Scores:                  ED Course as of 07/27/24 2004   Sat Jul 27, 2024 1855 WBC(!): 18.02 [KA]   1855 Hemoglobin: 13.2 [KA]   1908 Lactate: 2.0 [KA]   1908 Glucose(!): 120 [KA]   1908 Creatinine: 0.75 [KA]   1913 The patient is failed outpatient treat with antibiotics.  Due to history of MRSA covered with Rocephin and vancomycin.  IV fluids ordered, Tylenol given for pain and fever.  She declined anything stronger but will let us know if that changes.  Recommended admission for inpatient treatment and she is agreeable. [KA]   1922 I discussed patient with Dr. Zavala, hospitalist, including history presentation workup and he agrees to admit for further evaluation and treatment. [KA]      ED Course User Index  [KA] Clara Thorne PA-C             AS OF 20:04 EDT VITALS:    BP - 146/85  HR - 97  TEMP - (!) 102.2 °F (39 °C)  O2 SATS - 96%    COMPLEXITY OF CARE  The patient requires admission.      DIAGNOSIS  Final diagnoses:   Sepsis without acute organ dysfunction, due to unspecified organism   Cellulitis of right lower leg         DISPOSITION  ED  Disposition       ED Disposition   Decision to Admit    Condition   --    Comment   Level of Care: Telemetry [5]   Diagnosis: Sepsis [3581606]   Admitting Physician: MARION BERNSTEIN [[id number]]   Attending Physician: MARION BERNSTEIN [[id number]]   Certification: I Certify That Inpatient Hospital Services Are Medically Necessary For Greater Than 2 Midnights                    FOLLOW UP  No follow-up provider specified.      Prescribed Medications     Medication List      No changes were made to your prescriptions during this visit.                   Please note that portions of this document were completed with a voice recognition program.    Note Disclaimer: At [facility], we believe that sharing information builds trust and better relationships. You are receiving this note because you recently visited [facility]. It is possible you will see health information before a provider has talked with you about it. This kind of information can be easy to misunderstand. To help you fully understand what it means for your health, we urge you to discuss this note with your provider.         Clara Thorne PA-C  07/27/24 2005      Electronically signed by Waldemar Shin MD at 07/27/24 2316       Daina Hoskins, RN at 07/27/24 1850          Pt c/o right lower leg redness, swelling and pain that started 2wks ago. Pt had round of abx with some improvement. Reports fever, chills today. Pt has not treated fever yet today    Electronically signed by Daina Hoskins, RN at 07/27/24 1851       Elenita Kramer, RN at 07/27/24 1818          Patient to ER via car from home for r leg redness warm and pain  Patient dx with cellulitis 2 weeks ago got better but not gone states she developed a fever today     Electronically signed by Elenita Kramer, RN at 07/27/24 1819       Oxygen Therapy (since admission)       Date/Time SpO2 Device (Oxygen Therapy) Flow (L/min) Oxygen Concentration (%) ETCO2 (mmHg)    07/29/24  1300 94 room air -- -- --    24 0843 -- room air -- -- --    24 0720 95 room air -- -- --    24 2336 93 room air -- -- --    24 1941 95 room air -- -- --    24 0838 -- room air -- -- --    24 0745 93 -- -- -- --    24 0040 -- room air -- -- --    24 233 96 room air -- -- --    24 2105 -- room air -- -- --    24 94 -- -- -- --    24 194 96 -- -- -- --    24 193 94 -- -- -- --    24 185 96 -- -- -- --    24 184 93 -- -- -- --    24 184 96 -- -- -- --    24 1820 93 -- -- -- --          Intake & Output (last 3 days)          07 07 07 07 07 07 07 0700    P.O.   450 120    IV Piggyback  100      Total Intake(mL/kg)  100 (1.2) 450 (5.5) 120 (1.5)    Net  +100 +450 +120            Urine Unmeasured Occurrence  1 x             Lines, Drains & Airways       Active LDAs       Name Placement date Placement time Site Days    Peripheral IV 24 1454 Anterior;Right Forearm 24  1454  Forearm  1              Inactive LDAs       Name Placement date Placement time Removal date Removal time Site Days    [REMOVED] Peripheral IV 24 1834 Left Antecubital 24  1834  24  1434  Antecubital  less than 1                  Operative/Procedure Notes (last 72 hours)  Notes from 24 1548 through 24 1548   No notes of this type exist for this encounter.          Physician Progress Notes (all)        Billy Ricks MD at 24 3798          DAILY PROGRESS NOTE  [facility]    Patient Identification:  Name: Brandy El  Age: 64 y.o.  Sex: female  :  1960  MRN: [id number]         Primary Care Physician: Tiffanie Woodard MD    Subjective:  Interval History: She is feeling a little bit better today.    Objective:    Scheduled Meds:[Held by provider] amLODIPine, 2.5 mg, Oral, Daily  atorvastatin, 20 mg, Oral,  "Daily  buPROPion XL, 450 mg, Oral, QAM  cefTRIAXone, 2,000 mg, Intravenous, Q24H  enoxaparin, 40 mg, Subcutaneous, Nightly  escitalopram, 10 mg, Oral, Nightly  propranolol, 30 mg, Oral, Q12H  sodium chloride, 10 mL, Intravenous, Q12H  vancomycin, 1,250 mg, Intravenous, Q12H      Continuous Infusions:Pharmacy to dose vancomycin,   sodium chloride, 100 mL/hr, Last Rate: 100 mL/hr (07/28/24 1943)        Vital signs in last 24 hours:  Temp:  [98.1 °F (36.7 °C)-99 °F (37.2 °C)] 98.1 °F (36.7 °C)  Heart Rate:  [61-67] 67  Resp:  [16-18] 18  BP: (115-149)/(66-75) 134/66    Intake/Output:    Intake/Output Summary (Last 24 hours) at 7/29/2024 1457  Last data filed at 7/29/2024 0833  Gross per 24 hour   Intake 120 ml   Output --   Net 120 ml       Exam:  /66 (BP Location: Left arm, Patient Position: Sitting)   Pulse 67   Temp 98.1 °F (36.7 °C) (Oral)   Resp 18   Ht 160 cm (63\")   Wt 81.3 kg (179 lb 4.8 oz)   SpO2 94%   BMI 31.76 kg/m²     General Appearance:    Alert, cooperative, no distress   Head:    Normocephalic, without obvious abnormality, atraumatic   Eyes:       Throat:   Lips, tongue, gums normal   Neck:   Supple, symmetrical, trachea midline, no JVD   Lungs:     Clear to auscultation bilaterally, respirations unlabored   Chest Wall:    No tenderness or deformity    Heart:    Regular rate and rhythm, S1 and S2 normal, no murmur,no  rub or gallop   Abdomen:     Soft, nontender, bowel sounds active, no masses, no organomegaly    Extremities:   Extremities normal, atraumatic, no cyanosis or edema   Pulses:      Skin:   Skin is warm and dry, cellulitis on right lower extremity   Neurologic:   no focal deficits noted      Lab Results (last 72 hours)       Procedure Component Value Units Date/Time    Basic Metabolic Panel [[id number]]  (Abnormal) Collected: 07/28/24 0535    Specimen: Blood Updated: 07/28/24 0611     Glucose 118 mg/dL      BUN 9 mg/dL      Creatinine 0.67 mg/dL      Sodium 136 mmol/L      " Potassium 3.9 mmol/L      Chloride 105 mmol/L      CO2 24.0 mmol/L      Calcium 8.0 mg/dL      BUN/Creatinine Ratio 13.4     Anion Gap 7.0 mmol/L      eGFR 99.0 mL/min/1.73     Narrative:      GFR Normal >60  Chronic Kidney Disease <60  Kidney Failure <15      Magnesium [[id number]]  (Normal) Collected: 07/28/24 0535    Specimen: Blood Updated: 07/28/24 0611     Magnesium 1.9 mg/dL     Phosphorus [[id number]]  (Abnormal) Collected: 07/28/24 0535    Specimen: Blood Updated: 07/28/24 0611     Phosphorus 2.1 mg/dL     CBC (No Diff) [[id number]]  (Abnormal) Collected: 07/28/24 0535    Specimen: Blood Updated: 07/28/24 0607     WBC 12.72 10*3/mm3      RBC 3.63 10*6/mm3      Hemoglobin 10.8 g/dL      Hematocrit 33.2 %      MCV 91.5 fL      MCH 29.8 pg      MCHC 32.5 g/dL      RDW 12.4 %      RDW-SD 41.2 fl      MPV 9.0 fL      Platelets 194 10*3/mm3     Hemoglobin A1c [[id number]]  (Normal) Collected: 07/28/24 0536    Specimen: Blood Updated: 07/28/24 0604     Hemoglobin A1C 5.10 %     Narrative:      Hemoglobin A1C Ranges:    Increased Risk for Diabetes  5.7% to 6.4%  Diabetes                     >= 6.5%  Diabetic Goal                < 7.0%    Blood Culture - Blood, Arm, Right [[id number]] Collected: 07/27/24 1931    Specimen: Blood from Arm, Right Updated: 07/27/24 1937    Blood Culture - Blood, Arm, Left [[id number]] Collected: 07/27/24 1931    Specimen: Blood from Arm, Left Updated: 07/27/24 1937    Comprehensive Metabolic Panel [[id number]]  (Abnormal) Collected: 07/27/24 1833    Specimen: Blood Updated: 07/27/24 1905     Glucose 120 mg/dL      BUN 17 mg/dL      Creatinine 0.75 mg/dL      Sodium 138 mmol/L      Potassium 3.8 mmol/L      Chloride 103 mmol/L      CO2 22.0 mmol/L      Calcium 8.9 mg/dL      Total Protein 7.3 g/dL      Albumin 4.3 g/dL      ALT (SGPT) 18 U/L      AST (SGOT) 16 U/L      Alkaline Phosphatase 67 U/L      Total Bilirubin 0.5 mg/dL      Globulin 3.0 gm/dL      A/G Ratio 1.4 g/dL      BUN/Creatinine  Ratio 22.7     Anion Gap 13.0 mmol/L      eGFR 90.1 mL/min/1.73     Narrative:      GFR Normal >60  Chronic Kidney Disease <60  Kidney Failure <15      Lactic Acid, Plasma [[id number]]  (Normal) Collected: 07/27/24 1833    Specimen: Blood Updated: 07/27/24 1859     Lactate 2.0 mmol/L     [place] Draw [[id number]] Collected: 07/27/24 1833    Specimen: Blood Updated: 07/27/24 1845    Narrative:      The following orders were created for panel order [place] Draw.  Procedure                               Abnormality         Status                     ---------                               -----------         ------                     Green Top (Gel)[[id number]]                                  Final result               Lavender Top[[id number]]                                     Final result               Gold Top - SST[[id number]]                                   Final result               Light Blue Top[[id number]]                                   Final result                 Please view results for these tests on the individual orders.    Green Top (Gel) [[id number]] Collected: 07/27/24 1833    Specimen: Blood Updated: 07/27/24 1845     Extra Tube Hold for add-ons.     Comment: Auto resulted.       Lavender Top [[id number]] Collected: 07/27/24 1833    Specimen: Blood Updated: 07/27/24 1845     Extra Tube hold for add-on     Comment: Auto resulted       Gold Top - SST [[id number]] Collected: 07/27/24 1833    Specimen: Blood Updated: 07/27/24 1845     Extra Tube Hold for add-ons.     Comment: Auto resulted.       Light Blue Top [[id number]] Collected: 07/27/24 1833    Specimen: Blood Updated: 07/27/24 1845     Extra Tube Hold for add-ons.     Comment: Auto resulted       CBC & Differential [[id number]]  (Abnormal) Collected: 07/27/24 1833    Specimen: Blood Updated: 07/27/24 1845    Narrative:      The following orders were created for panel order CBC & Differential.  Procedure                               Abnormality        "  Status                     ---------                               -----------         ------                     CBC Auto Differential[[id number]]        Abnormal            Final result                 Please view results for these tests on the individual orders.    CBC Auto Differential [[id number]]  (Abnormal) Collected: 07/27/24 1833    Specimen: Blood Updated: 07/27/24 1845     WBC 18.02 10*3/mm3      RBC 4.35 10*6/mm3      Hemoglobin 13.2 g/dL      Hematocrit 39.6 %      MCV 91.0 fL      MCH 30.3 pg      MCHC 33.3 g/dL      RDW 12.1 %      RDW-SD 40.2 fl      MPV 8.9 fL      Platelets 267 10*3/mm3      Neutrophil % 92.0 %      Lymphocyte % 4.3 %      Monocyte % 2.8 %      Eosinophil % 0.3 %      Basophil % 0.2 %      Immature Grans % 0.4 %      Neutrophils, Absolute 16.57 10*3/mm3      Lymphocytes, Absolute 0.78 10*3/mm3      Monocytes, Absolute 0.50 10*3/mm3      Eosinophils, Absolute 0.05 10*3/mm3      Basophils, Absolute 0.04 10*3/mm3      Immature Grans, Absolute 0.08 10*3/mm3      nRBC 0.0 /100 WBC           Data Review:  Results from last 7 days   Lab Units 07/29/24  0819 07/28/24  0535 07/27/24  1833   SODIUM mmol/L 140 136 138   POTASSIUM mmol/L 4.0 3.9 3.8   CHLORIDE mmol/L 109* 105 103   CO2 mmol/L 24.0 24.0 22.0   BUN mg/dL 7* 9 17   CREATININE mg/dL 0.65 0.67 0.75   GLUCOSE mg/dL 107* 118* 120*   CALCIUM mg/dL 8.5* 8.0* 8.9     Results from last 7 days   Lab Units 07/29/24  0819 07/28/24  0535 07/27/24  1833   WBC 10*3/mm3 4.47 12.72* 18.02*   HEMOGLOBIN g/dL 11.3* 10.8* 13.2   HEMATOCRIT % 33.9* 33.2* 39.6   PLATELETS 10*3/mm3 187 194 267         Results from last 7 days   Lab Units 07/28/24  0536   HEMOGLOBIN A1C % 5.10     No results found for: \"TROPONINT\"      Results from last 7 days   Lab Units 07/27/24  1833   ALK PHOS U/L 67   BILIRUBIN mg/dL 0.5   ALT (SGPT) U/L 18   AST (SGOT) U/L 16         Results from last 7 days   Lab Units 07/28/24  0536   HEMOGLOBIN A1C % 5.10     No results found " "for: \"POCGLU\"        Past Medical History:   Diagnosis Date    Anxiety     ASCUS of cervix with negative high risk HPV 2001    ASCUS of cervix with negative high risk HPV 2005    Colon polyp     Depression     Essential hematuria     Gait disturbance     R foot drop    Health care maintenance 2016    Hyperlipidemia     Hypertension 2020    Irritable bowel syndrome many years ago    Postmenopausal     Vaginal delivery        Assessment:  Active Hospital Problems    Diagnosis  POA    **Sepsis [A41.9]  Yes    Cellulitis of right lower extremity [L03.115]  Yes    History of MRSA infection [Z86.14]  Yes    Hyperlipidemia [E78.5]  Yes      Resolved Hospital Problems   No resolved problems to display.       Plan:  Continue with antibiotics and await results of cultures.  Follow-up on labs.  Zofran for nausea.  Probably needs another day or 2.  Since MRSA screen is negative will switch to capsule and DC vancomycin and Rocephin.    Billy Ricks MD  2024  14:57 EDT      Electronically signed by Billy Ricks MD at 24 1457       Billy Ricks MD at 24 1355          DAILY PROGRESS NOTE  [facility]    Patient Identification:  Name: Brandy El  Age: 64 y.o.  Sex: female  :  1960  MRN: [id number]         Primary Care Physician: Tiffanie Woodard MD    Subjective:  Interval History: She is feeling a little bit better today.    Objective:    Scheduled Meds:[Held by provider] amLODIPine, 2.5 mg, Oral, Daily  atorvastatin, 20 mg, Oral, Daily  buPROPion XL, 450 mg, Oral, QAM  cefTRIAXone, 2,000 mg, Intravenous, Q24H  enoxaparin, 40 mg, Subcutaneous, Nightly  escitalopram, 10 mg, Oral, Nightly  propranolol, 30 mg, Oral, Q12H  sodium chloride, 10 mL, Intravenous, Q12H  vancomycin, 1,000 mg, Intravenous, Q12H      Continuous Infusions:Pharmacy to dose vancomycin,   sodium chloride, 100 mL/hr, Last Rate: 100 mL/hr (24)        Vital signs in last 24 hours:  Temp:  [98.8 " "°F (37.1 °C)-102.2 °F (39 °C)] 98.8 °F (37.1 °C)  Heart Rate:  [] 72  Resp:  [16-20] 16  BP: (117-173)/(62-92) 117/63    Intake/Output:    Intake/Output Summary (Last 24 hours) at 7/28/2024 1355  Last data filed at 7/28/2024 0904  Gross per 24 hour   Intake 550 ml   Output --   Net 550 ml       Exam:  /63 (BP Location: Right arm, Patient Position: Lying)   Pulse 72   Temp 98.8 °F (37.1 °C) (Oral)   Resp 16   Ht 160 cm (63\")   Wt 82.3 kg (181 lb 6.4 oz)   SpO2 93%   BMI 32.13 kg/m²     General Appearance:    Alert, cooperative, no distress   Head:    Normocephalic, without obvious abnormality, atraumatic   Eyes:       Throat:   Lips, tongue, gums normal   Neck:   Supple, symmetrical, trachea midline, no JVD   Lungs:     Clear to auscultation bilaterally, respirations unlabored   Chest Wall:    No tenderness or deformity    Heart:    Regular rate and rhythm, S1 and S2 normal, no murmur,no  rub or gallop   Abdomen:     Soft, nontender, bowel sounds active, no masses, no organomegaly    Extremities:   Extremities normal, atraumatic, no cyanosis or edema   Pulses:      Skin:   Skin is warm and dry, cellulitis on right lower extremity   Neurologic:   no focal deficits noted      Lab Results (last 72 hours)       Procedure Component Value Units Date/Time    Basic Metabolic Panel [[id number]]  (Abnormal) Collected: 07/28/24 0535    Specimen: Blood Updated: 07/28/24 0611     Glucose 118 mg/dL      BUN 9 mg/dL      Creatinine 0.67 mg/dL      Sodium 136 mmol/L      Potassium 3.9 mmol/L      Chloride 105 mmol/L      CO2 24.0 mmol/L      Calcium 8.0 mg/dL      BUN/Creatinine Ratio 13.4     Anion Gap 7.0 mmol/L      eGFR 99.0 mL/min/1.73     Narrative:      GFR Normal >60  Chronic Kidney Disease <60  Kidney Failure <15      Magnesium [[id number]]  (Normal) Collected: 07/28/24 0535    Specimen: Blood Updated: 07/28/24 0611     Magnesium 1.9 mg/dL     Phosphorus [[id number]]  (Abnormal) Collected: 07/28/24 0535    " Specimen: Blood Updated: 07/28/24 0611     Phosphorus 2.1 mg/dL     CBC (No Diff) [[id number]]  (Abnormal) Collected: 07/28/24 0535    Specimen: Blood Updated: 07/28/24 0607     WBC 12.72 10*3/mm3      RBC 3.63 10*6/mm3      Hemoglobin 10.8 g/dL      Hematocrit 33.2 %      MCV 91.5 fL      MCH 29.8 pg      MCHC 32.5 g/dL      RDW 12.4 %      RDW-SD 41.2 fl      MPV 9.0 fL      Platelets 194 10*3/mm3     Hemoglobin A1c [[id number]]  (Normal) Collected: 07/28/24 0536    Specimen: Blood Updated: 07/28/24 0604     Hemoglobin A1C 5.10 %     Narrative:      Blood Culture - Blood, Arm, Right [[id number]] Collected: 07/27/24 1931    Specimen: Blood from Arm, Right Updated: 07/27/24 1937    Blood Culture - Blood, Arm, Left [[id number]] Collected: 07/27/24 1931    Specimen: Blood from Arm, Left Updated: 07/27/24 1937    Comprehensive Metabolic Panel [[id number]]  (Abnormal) Collected: 07/27/24 1833    Specimen: Blood Updated: 07/27/24 1905     Glucose 120 mg/dL      BUN 17 mg/dL      Creatinine 0.75 mg/dL      Sodium 138 mmol/L      Potassium 3.8 mmol/L      Chloride 103 mmol/L      CO2 22.0 mmol/L      Calcium 8.9 mg/dL      Total Protein 7.3 g/dL      Albumin 4.3 g/dL      ALT (SGPT) 18 U/L      AST (SGOT) 16 U/L      Alkaline Phosphatase 67 U/L      Total Bilirubin 0.5 mg/dL      Globulin 3.0 gm/dL      A/G Ratio 1.4 g/dL      BUN/Creatinine Ratio 22.7     Anion Gap 13.0 mmol/L      eGFR 90.1 mL/min/1.73     Narrative:      GFR Normal >60  Chronic Kidney Disease <60  Kidney Failure <15      Lactic Acid, Plasma [[id number]]  (Normal) Collected: 07/27/24 1833    Specimen: Blood Updated: 07/27/24 1859     Lactate 2.0 mmol/L     [place] Draw [[id number]] Collected: 07/27/24 1833    Specimen: Blood Updated: 07/27/24 2860    Narrative:      The following orders were created for panel order [place] Draw.  Procedure                               Abnormality         Status                     ---------                                -----------         ------                     Green Top (Gel)[[id number]]                                  Final result               Lavender Top[[id number]]                                     Final result               Gold Top - SST[[id number]]                                   Final result               Light Blue Top[[id number]]                                   Final result                 Please view results for these tests on the individual orders.    Green Top (Gel) [[id number]] Collected: 07/27/24 1833    Specimen: Blood Updated: 07/27/24 1845     Extra Tube Hold for add-ons.     Comment: Auto resulted.       Lavender Top [[id number]] Collected: 07/27/24 1833    Specimen: Blood Updated: 07/27/24 1845     Extra Tube hold for add-on     Comment: Auto resulted       Gold Top - SST [[id number]] Collected: 07/27/24 1833    Specimen: Blood Updated: 07/27/24 1845     Extra Tube Hold for add-ons.     Comment: Auto resulted.       Light Blue Top [[id number]] Collected: 07/27/24 1833    Specimen: Blood Updated: 07/27/24 1845     Extra Tube Hold for add-ons.     Comment: Auto resulted       CBC & Differential [[id number]]  (Abnormal) Collected: 07/27/24 1833    Specimen: Blood Updated: 07/27/24 1845    Narrative:      The following orders were created for panel order CBC & Differential.  Procedure                               Abnormality         Status                     ---------                               -----------         ------                     CBC Auto Differential[[id number]]        Abnormal            Final result                 Please view results for these tests on the individual orders.    CBC Auto Differential [[id number]]  (Abnormal) Collected: 07/27/24 1833    Specimen: Blood Updated: 07/27/24 1845     WBC 18.02 10*3/mm3      RBC 4.35 10*6/mm3      Hemoglobin 13.2 g/dL      Hematocrit 39.6 %      MCV 91.0 fL      MCH 30.3 pg      MCHC 33.3 g/dL      RDW 12.1 %      RDW-SD 40.2 fl      MPV 8.9 fL       "Platelets 267 10*3/mm3      Neutrophil % 92.0 %      Lymphocyte % 4.3 %      Monocyte % 2.8 %      Eosinophil % 0.3 %      Basophil % 0.2 %      Immature Grans % 0.4 %      Neutrophils, Absolute 16.57 10*3/mm3      Lymphocytes, Absolute 0.78 10*3/mm3      Monocytes, Absolute 0.50 10*3/mm3      Eosinophils, Absolute 0.05 10*3/mm3      Basophils, Absolute 0.04 10*3/mm3      Immature Grans, Absolute 0.08 10*3/mm3      nRBC 0.0 /100 WBC           Data Review:  Results from last 7 days   Lab Units 07/28/24  0535 07/27/24  1833   SODIUM mmol/L 136 138   POTASSIUM mmol/L 3.9 3.8   CHLORIDE mmol/L 105 103   CO2 mmol/L 24.0 22.0   BUN mg/dL 9 17   CREATININE mg/dL 0.67 0.75   GLUCOSE mg/dL 118* 120*   CALCIUM mg/dL 8.0* 8.9     Results from last 7 days   Lab Units 07/28/24  0535 07/27/24  1833   WBC 10*3/mm3 12.72* 18.02*   HEMOGLOBIN g/dL 10.8* 13.2   HEMATOCRIT % 33.2* 39.6   PLATELETS 10*3/mm3 194 267         Results from last 7 days   Lab Units 07/28/24  0536   HEMOGLOBIN A1C % 5.10     No results found for: \"TROPONINT\"      Results from last 7 days   Lab Units 07/27/24  1833   ALK PHOS U/L 67   BILIRUBIN mg/dL 0.5   ALT (SGPT) U/L 18   AST (SGOT) U/L 16         Results from last 7 days   Lab Units 07/28/24  0536   HEMOGLOBIN A1C % 5.10     No results found for: \"POCGLU\"        Past Medical History:   Diagnosis Date    Anxiety     ASCUS of cervix with negative high risk HPV 09/2001    ASCUS of cervix with negative high risk HPV 09/2005    Colon polyp 2013    Depression     Essential hematuria     Gait disturbance     R foot drop    Health care maintenance 06/14/2016    Hyperlipidemia     Hypertension 2020    Irritable bowel syndrome many years ago    Postmenopausal     Vaginal delivery        Assessment:  Active Hospital Problems    Diagnosis  POA    **Sepsis [A41.9]  Yes    Cellulitis of right lower extremity [L03.115]  Yes    History of MRSA infection [Z86.14]  Yes    Hyperlipidemia [E78.5]  Yes      Resolved Hospital " Problems   No resolved problems to display.       Plan:  Continue with antibiotics and await results of cultures.  Follow-up on labs.  Zofran for nausea.  Probably needs another day or 2.  Will get MRSA screen.    Billy Ricks MD  7/28/2024  13:55 EDT      Electronically signed by Billy Ricks MD at 07/28/24 6756

## 2024-07-29 NOTE — PLAN OF CARE
Goal Outcome Evaluation:              Outcome Evaluation: vss overnight on RA.  NS @ 100ml/hr. IV abx.

## 2024-07-29 NOTE — PROGRESS NOTES
"DAILY PROGRESS NOTE  [facility]    Patient Identification:  Name: Brandy El  Age: 64 y.o.  Sex: female  :  1960  MRN: [id number]         Primary Care Physician: Tiffanie Woodard MD    Subjective:  Interval History: She is feeling a little bit better today.    Objective:    Scheduled Meds:[Held by provider] amLODIPine, 2.5 mg, Oral, Daily  atorvastatin, 20 mg, Oral, Daily  buPROPion XL, 450 mg, Oral, QAM  cefTRIAXone, 2,000 mg, Intravenous, Q24H  enoxaparin, 40 mg, Subcutaneous, Nightly  escitalopram, 10 mg, Oral, Nightly  propranolol, 30 mg, Oral, Q12H  sodium chloride, 10 mL, Intravenous, Q12H  vancomycin, 1,250 mg, Intravenous, Q12H      Continuous Infusions:Pharmacy to dose vancomycin,   sodium chloride, 100 mL/hr, Last Rate: 100 mL/hr (24)        Vital signs in last 24 hours:  Temp:  [98.1 °F (36.7 °C)-99 °F (37.2 °C)] 98.1 °F (36.7 °C)  Heart Rate:  [61-67] 67  Resp:  [16-18] 18  BP: (115-149)/(66-75) 134/66    Intake/Output:    Intake/Output Summary (Last 24 hours) at 2024 1457  Last data filed at 2024 0833  Gross per 24 hour   Intake 120 ml   Output --   Net 120 ml       Exam:  /66 (BP Location: Left arm, Patient Position: Sitting)   Pulse 67   Temp 98.1 °F (36.7 °C) (Oral)   Resp 18   Ht 160 cm (63\")   Wt 81.3 kg (179 lb 4.8 oz)   SpO2 94%   BMI 31.76 kg/m²     General Appearance:    Alert, cooperative, no distress   Head:    Normocephalic, without obvious abnormality, atraumatic   Eyes:       Throat:   Lips, tongue, gums normal   Neck:   Supple, symmetrical, trachea midline, no JVD   Lungs:     Clear to auscultation bilaterally, respirations unlabored   Chest Wall:    No tenderness or deformity    Heart:    Regular rate and rhythm, S1 and S2 normal, no murmur,no  rub or gallop   Abdomen:     Soft, nontender, bowel sounds active, no masses, no organomegaly    Extremities:   Extremities normal, atraumatic, no cyanosis or edema   Pulses:      Skin:   " Skin is warm and dry, cellulitis on right lower extremity   Neurologic:   no focal deficits noted      Lab Results (last 72 hours)       Procedure Component Value Units Date/Time    Basic Metabolic Panel [[id number]]  (Abnormal) Collected: 07/28/24 0535    Specimen: Blood Updated: 07/28/24 0611     Glucose 118 mg/dL      BUN 9 mg/dL      Creatinine 0.67 mg/dL      Sodium 136 mmol/L      Potassium 3.9 mmol/L      Chloride 105 mmol/L      CO2 24.0 mmol/L      Calcium 8.0 mg/dL      BUN/Creatinine Ratio 13.4     Anion Gap 7.0 mmol/L      eGFR 99.0 mL/min/1.73     Narrative:      GFR Normal >60  Chronic Kidney Disease <60  Kidney Failure <15      Magnesium [[id number]]  (Normal) Collected: 07/28/24 0535    Specimen: Blood Updated: 07/28/24 0611     Magnesium 1.9 mg/dL     Phosphorus [[id number]]  (Abnormal) Collected: 07/28/24 0535    Specimen: Blood Updated: 07/28/24 0611     Phosphorus 2.1 mg/dL     CBC (No Diff) [[id number]]  (Abnormal) Collected: 07/28/24 0535    Specimen: Blood Updated: 07/28/24 0607     WBC 12.72 10*3/mm3      RBC 3.63 10*6/mm3      Hemoglobin 10.8 g/dL      Hematocrit 33.2 %      MCV 91.5 fL      MCH 29.8 pg      MCHC 32.5 g/dL      RDW 12.4 %      RDW-SD 41.2 fl      MPV 9.0 fL      Platelets 194 10*3/mm3     Hemoglobin A1c [[id number]]  (Normal) Collected: 07/28/24 0536    Specimen: Blood Updated: 07/28/24 0604     Hemoglobin A1C 5.10 %     Narrative:      Hemoglobin A1C Ranges:    Increased Risk for Diabetes  5.7% to 6.4%  Diabetes                     >= 6.5%  Diabetic Goal                < 7.0%    Blood Culture - Blood, Arm, Right [[id number]] Collected: 07/27/24 1931    Specimen: Blood from Arm, Right Updated: 07/27/24 1937    Blood Culture - Blood, Arm, Left [[id number]] Collected: 07/27/24 1931    Specimen: Blood from Arm, Left Updated: 07/27/24 1937    Comprehensive Metabolic Panel [[id number]]  (Abnormal) Collected: 07/27/24 1833    Specimen: Blood Updated: 07/27/24 1905     Glucose 120 mg/dL       BUN 17 mg/dL      Creatinine 0.75 mg/dL      Sodium 138 mmol/L      Potassium 3.8 mmol/L      Chloride 103 mmol/L      CO2 22.0 mmol/L      Calcium 8.9 mg/dL      Total Protein 7.3 g/dL      Albumin 4.3 g/dL      ALT (SGPT) 18 U/L      AST (SGOT) 16 U/L      Alkaline Phosphatase 67 U/L      Total Bilirubin 0.5 mg/dL      Globulin 3.0 gm/dL      A/G Ratio 1.4 g/dL      BUN/Creatinine Ratio 22.7     Anion Gap 13.0 mmol/L      eGFR 90.1 mL/min/1.73     Narrative:      GFR Normal >60  Chronic Kidney Disease <60  Kidney Failure <15      Lactic Acid, Plasma [[id number]]  (Normal) Collected: 07/27/24 1833    Specimen: Blood Updated: 07/27/24 1859     Lactate 2.0 mmol/L     [place] Draw [[id number]] Collected: 07/27/24 1833    Specimen: Blood Updated: 07/27/24 1845    Narrative:      The following orders were created for panel order [place] Draw.  Procedure                               Abnormality         Status                     ---------                               -----------         ------                     Green Top (Gel)[[id number]]                                  Final result               Lavender Top[[id number]]                                     Final result               Gold Top - SST[[id number]]                                   Final result               Light Blue Top[[id number]]                                   Final result                 Please view results for these tests on the individual orders.    Green Top (Gel) [[id number]] Collected: 07/27/24 1833    Specimen: Blood Updated: 07/27/24 1845     Extra Tube Hold for add-ons.     Comment: Auto resulted.       Lavender Top [[id number]] Collected: 07/27/24 1833    Specimen: Blood Updated: 07/27/24 1845     Extra Tube hold for add-on     Comment: Auto resulted       Gold Top - SST [[id number]] Collected: 07/27/24 1833    Specimen: Blood Updated: 07/27/24 1845     Extra Tube Hold for add-ons.     Comment: Auto resulted.       Light Blue Top [[id number]]  Collected: 07/27/24 1833    Specimen: Blood Updated: 07/27/24 1845     Extra Tube Hold for add-ons.     Comment: Auto resulted       CBC & Differential [[id number]]  (Abnormal) Collected: 07/27/24 1833    Specimen: Blood Updated: 07/27/24 1845    Narrative:      The following orders were created for panel order CBC & Differential.  Procedure                               Abnormality         Status                     ---------                               -----------         ------                     CBC Auto Differential[[id number]]        Abnormal            Final result                 Please view results for these tests on the individual orders.    CBC Auto Differential [[id number]]  (Abnormal) Collected: 07/27/24 1833    Specimen: Blood Updated: 07/27/24 1845     WBC 18.02 10*3/mm3      RBC 4.35 10*6/mm3      Hemoglobin 13.2 g/dL      Hematocrit 39.6 %      MCV 91.0 fL      MCH 30.3 pg      MCHC 33.3 g/dL      RDW 12.1 %      RDW-SD 40.2 fl      MPV 8.9 fL      Platelets 267 10*3/mm3      Neutrophil % 92.0 %      Lymphocyte % 4.3 %      Monocyte % 2.8 %      Eosinophil % 0.3 %      Basophil % 0.2 %      Immature Grans % 0.4 %      Neutrophils, Absolute 16.57 10*3/mm3      Lymphocytes, Absolute 0.78 10*3/mm3      Monocytes, Absolute 0.50 10*3/mm3      Eosinophils, Absolute 0.05 10*3/mm3      Basophils, Absolute 0.04 10*3/mm3      Immature Grans, Absolute 0.08 10*3/mm3      nRBC 0.0 /100 WBC           Data Review:  Results from last 7 days   Lab Units 07/29/24 0819 07/28/24 0535 07/27/24 1833   SODIUM mmol/L 140 136 138   POTASSIUM mmol/L 4.0 3.9 3.8   CHLORIDE mmol/L 109* 105 103   CO2 mmol/L 24.0 24.0 22.0   BUN mg/dL 7* 9 17   CREATININE mg/dL 0.65 0.67 0.75   GLUCOSE mg/dL 107* 118* 120*   CALCIUM mg/dL 8.5* 8.0* 8.9     Results from last 7 days   Lab Units 07/29/24 0819 07/28/24  0535 07/27/24  1833   WBC 10*3/mm3 4.47 12.72* 18.02*   HEMOGLOBIN g/dL 11.3* 10.8* 13.2   HEMATOCRIT % 33.9* 33.2* 39.6  "  PLATELETS 10*3/mm3 187 194 267         Results from last 7 days   Lab Units 07/28/24  0536   HEMOGLOBIN A1C % 5.10     No results found for: \"TROPONINT\"      Results from last 7 days   Lab Units 07/27/24  1833   ALK PHOS U/L 67   BILIRUBIN mg/dL 0.5   ALT (SGPT) U/L 18   AST (SGOT) U/L 16         Results from last 7 days   Lab Units 07/28/24  0536   HEMOGLOBIN A1C % 5.10     No results found for: \"POCGLU\"        Past Medical History:   Diagnosis Date    Anxiety     ASCUS of cervix with negative high risk HPV 09/2001    ASCUS of cervix with negative high risk HPV 09/2005    Colon polyp 2013    Depression     Essential hematuria     Gait disturbance     R foot drop    Health care maintenance 06/14/2016    Hyperlipidemia     Hypertension 2020    Irritable bowel syndrome many years ago    Postmenopausal     Vaginal delivery        Assessment:  Active Hospital Problems    Diagnosis  POA    **Sepsis [A41.9]  Yes    Cellulitis of right lower extremity [L03.115]  Yes    History of MRSA infection [Z86.14]  Yes    Hyperlipidemia [E78.5]  Yes      Resolved Hospital Problems   No resolved problems to display.       Plan:  Continue with antibiotics and await results of cultures.  Follow-up on labs.  Zofran for nausea.  Probably needs another day or 2.  Since MRSA screen is negative will switch to capsule and DC vancomycin and Rocephin.    Billy Ricks MD  7/29/2024  14:57 EDT    "

## 2024-07-29 NOTE — PROGRESS NOTES
Discharge Planning Assessment  [facility]     Patient Name: Brandy El  MRN: [id number]  Today's Date: 7/29/2024    Admit Date: 7/27/2024    Plan: Return home - lives alone   Discharge Needs Assessment       Row Name 07/29/24 1027       Living Environment    People in Home alone    Current Living Arrangements home    Potentially Unsafe Housing Conditions none    Primary Care Provided by self    Provides Primary Care For no one    Family Caregiver if Needed child(shabana), adult    Family Caregiver Names Brandon Cast [phone]    Quality of Family Relationships helpful    Able to Return to Prior Arrangements yes       Resource/Environmental Concerns    Resource/Environmental Concerns none    Transportation Concerns none       Transition Planning    Patient/Family Anticipates Transition to home    Patient/Family Anticipated Services at Transition none    Transportation Anticipated family or friend will provide       Discharge Needs Assessment    Readmission Within the Last 30 Days no previous admission in last 30 days    Equipment Currently Used at Home none    Concerns to be Addressed denies needs/concerns at this time    Anticipated Changes Related to Illness none    Equipment Needed After Discharge none                   Discharge Plan       Row Name 07/29/24 1029       Plan    Plan Return home - lives alone    Patient/Family in Agreement with Plan yes    Plan Comments Spoke with patient at bedside.  She lives alone, is IADL, uses no DME, has never used HH.  PCP is Dr. Tiffanie Woodard and pharmacy is [facility] on [address] @ [place].  Patient drives.  Daughter Serene [phone] will assist if needed.  She states she has several family members available if needed.  She plans to return home at [place].  CCP will follow.  Zofia KERN                  Continued Care and Services - Admitted Since 7/27/2024    No active coordination exists for this encounter.       Expected Discharge Date and Time        Expected Discharge Date Expected Discharge Time    Jul 30, 2024            Demographic Summary       Row Name 07/29/24 1027       General Information    Admission Type inpatient    Arrived From home    Referral Source admission list    Reason for Consult discharge planning    Preferred Language English                   Functional Status       Row Name 07/29/24 1027       Functional Status    Usual Activity Tolerance good    Current Activity Tolerance moderate       Functional Status, IADL    Medications independent    Meal Preparation independent    Housekeeping independent    Laundry independent    Shopping independent       Mental Status    General Appearance WDL WDL       Mental Status Summary    Recent Changes in Mental Status/Cognitive Functioning no changes                               Becky S. [place], RN

## 2024-07-29 NOTE — PLAN OF CARE
Goal Outcome Evaluation:           Progress: improving  Outcome Evaluation: A&Ox4. RA. IVF. IV abx changed to cefazolin. Phosphorus replaced per MD order. Up ad gene. Redness and warm to the touch noted in RLE. VSS.

## 2024-07-30 LAB
ANION GAP SERPL CALCULATED.3IONS-SCNC: 8 MMOL/L (ref 5–15)
BASOPHILS # BLD AUTO: 0.03 10*3/MM3 (ref 0–0.2)
BASOPHILS NFR BLD AUTO: 0.8 % (ref 0–1.5)
BUN SERPL-MCNC: 7 MG/DL (ref 8–23)
BUN/CREAT SERPL: 11.7 (ref 7–25)
CALCIUM SPEC-SCNC: 8.8 MG/DL (ref 8.6–10.5)
CHLORIDE SERPL-SCNC: 102 MMOL/L (ref 98–107)
CO2 SERPL-SCNC: 27 MMOL/L (ref 22–29)
CREAT SERPL-MCNC: 0.6 MG/DL (ref 0.57–1)
DEPRECATED RDW RBC AUTO: 40 FL (ref 37–54)
EGFRCR SERPLBLD CKD-EPI 2021: 101.6 ML/MIN/1.73
EOSINOPHIL # BLD AUTO: 0.12 10*3/MM3 (ref 0–0.4)
EOSINOPHIL NFR BLD AUTO: 3.3 % (ref 0.3–6.2)
ERYTHROCYTE [DISTWIDTH] IN BLOOD BY AUTOMATED COUNT: 12 % (ref 12.3–15.4)
GLUCOSE SERPL-MCNC: 100 MG/DL (ref 65–99)
HCT VFR BLD AUTO: 33.9 % (ref 34–46.6)
HGB BLD-MCNC: 11.1 G/DL (ref 12–15.9)
IMM GRANULOCYTES # BLD AUTO: 0.01 10*3/MM3 (ref 0–0.05)
IMM GRANULOCYTES NFR BLD AUTO: 0.3 % (ref 0–0.5)
LYMPHOCYTES # BLD AUTO: 0.96 10*3/MM3 (ref 0.7–3.1)
LYMPHOCYTES NFR BLD AUTO: 26.2 % (ref 19.6–45.3)
MCH RBC QN AUTO: 29.8 PG (ref 26.6–33)
MCHC RBC AUTO-ENTMCNC: 32.7 G/DL (ref 31.5–35.7)
MCV RBC AUTO: 91.1 FL (ref 79–97)
MONOCYTES # BLD AUTO: 0.34 10*3/MM3 (ref 0.1–0.9)
MONOCYTES NFR BLD AUTO: 9.3 % (ref 5–12)
NEUTROPHILS NFR BLD AUTO: 2.21 10*3/MM3 (ref 1.7–7)
NEUTROPHILS NFR BLD AUTO: 60.1 % (ref 42.7–76)
NRBC BLD AUTO-RTO: 0 /100 WBC (ref 0–0.2)
PLATELET # BLD AUTO: 197 10*3/MM3 (ref 140–450)
PMV BLD AUTO: 9.5 FL (ref 6–12)
POTASSIUM SERPL-SCNC: 3.8 MMOL/L (ref 3.5–5.2)
RBC # BLD AUTO: 3.72 10*6/MM3 (ref 3.77–5.28)
SODIUM SERPL-SCNC: 137 MMOL/L (ref 136–145)
WBC NRBC COR # BLD AUTO: 3.67 10*3/MM3 (ref 3.4–10.8)

## 2024-07-30 PROCEDURE — 25010000002 CEFAZOLIN PER 500 MG: Performed by: HOSPITALIST

## 2024-07-30 PROCEDURE — 25810000003 SODIUM CHLORIDE 0.9 % SOLUTION: Performed by: STUDENT IN AN ORGANIZED HEALTH CARE EDUCATION/TRAINING PROGRAM

## 2024-07-30 PROCEDURE — 25010000002 ONDANSETRON PER 1 MG: Performed by: HOSPITALIST

## 2024-07-30 PROCEDURE — 25010000002 ENOXAPARIN PER 10 MG: Performed by: STUDENT IN AN ORGANIZED HEALTH CARE EDUCATION/TRAINING PROGRAM

## 2024-07-30 PROCEDURE — 80048 BASIC METABOLIC PNL TOTAL CA: CPT | Performed by: HOSPITALIST

## 2024-07-30 PROCEDURE — 85025 COMPLETE CBC W/AUTO DIFF WBC: CPT | Performed by: HOSPITALIST

## 2024-07-30 RX ADMIN — SODIUM CHLORIDE 100 ML/HR: 9 INJECTION, SOLUTION INTRAVENOUS at 15:48

## 2024-07-30 RX ADMIN — ONDANSETRON 4 MG: 2 INJECTION INTRAMUSCULAR; INTRAVENOUS at 10:29

## 2024-07-30 RX ADMIN — ATORVASTATIN CALCIUM 20 MG: 20 TABLET, FILM COATED ORAL at 08:54

## 2024-07-30 RX ADMIN — PROPRANOLOL HYDROCHLORIDE 30 MG: 20 TABLET ORAL at 21:06

## 2024-07-30 RX ADMIN — ENOXAPARIN SODIUM 40 MG: 100 INJECTION SUBCUTANEOUS at 21:04

## 2024-07-30 RX ADMIN — ONDANSETRON 4 MG: 2 INJECTION INTRAMUSCULAR; INTRAVENOUS at 17:17

## 2024-07-30 RX ADMIN — Medication 10 ML: at 08:55

## 2024-07-30 RX ADMIN — Medication 10 ML: at 21:05

## 2024-07-30 RX ADMIN — SODIUM CHLORIDE 2000 MG: 900 INJECTION INTRAVENOUS at 08:55

## 2024-07-30 RX ADMIN — PROPRANOLOL HYDROCHLORIDE 30 MG: 20 TABLET ORAL at 08:54

## 2024-07-30 RX ADMIN — BUPROPION HYDROCHLORIDE 450 MG: 300 TABLET, EXTENDED RELEASE ORAL at 08:54

## 2024-07-30 RX ADMIN — ESCITALOPRAM OXALATE 10 MG: 10 TABLET, FILM COATED ORAL at 21:04

## 2024-07-30 RX ADMIN — CLONAZEPAM 0.5 MG: 0.5 TABLET ORAL at 21:04

## 2024-07-30 RX ADMIN — SODIUM CHLORIDE 2000 MG: 900 INJECTION INTRAVENOUS at 15:47

## 2024-07-30 NOTE — PLAN OF CARE
Goal Outcome Evaluation:           Progress: no change  Outcome Evaluation: A&Ox4. RA. C/o nausea, PRN zofran given. Up ad gene. Redness improved in RLE. IV abx. Possible discharge tomorrow. VSS.

## 2024-07-30 NOTE — PROGRESS NOTES
"DAILY PROGRESS NOTE  [facility]    Patient Identification:  Name: Brandy El  Age: 64 y.o.  Sex: female  :  1960  MRN: [id number]         Primary Care Physician: Tiffanie Woodard MD    Subjective:  Interval History: She is feeling a little bit better today.  She complained of some nausea today.    Objective:    Scheduled Meds:[Held by provider] amLODIPine, 2.5 mg, Oral, Daily  atorvastatin, 20 mg, Oral, Daily  buPROPion XL, 450 mg, Oral, QAM  ceFAZolin, 2,000 mg, Intravenous, Q8H  enoxaparin, 40 mg, Subcutaneous, Nightly  escitalopram, 10 mg, Oral, Nightly  propranolol, 30 mg, Oral, Q12H  sodium chloride, 10 mL, Intravenous, Q12H      Continuous Infusions:sodium chloride, 100 mL/hr, Last Rate: 100 mL/hr (24 1748)        Vital signs in last 24 hours:  Temp:  [98 °F (36.7 °C)-98.8 °F (37.1 °C)] 98.2 °F (36.8 °C)  Heart Rate:  [54-64] 57  Resp:  [16-18] 16  BP: (140-153)/(72-79) 144/76    Intake/Output:    Intake/Output Summary (Last 24 hours) at 2024 1422  Last data filed at 2024 1832  Gross per 24 hour   Intake 240 ml   Output --   Net 240 ml       Exam:  /76 (BP Location: Right arm, Patient Position: Sitting)   Pulse 57   Temp 98.2 °F (36.8 °C) (Oral)   Resp 16   Ht 160 cm (63\")   Wt 80 kg (176 lb 4.8 oz)   SpO2 95%   BMI 31.23 kg/m²     General Appearance:    Alert, cooperative, no distress   Head:    Normocephalic, without obvious abnormality, atraumatic   Eyes:       Throat:   Lips, tongue, gums normal   Neck:   Supple, symmetrical, trachea midline, no JVD   Lungs:     Clear to auscultation bilaterally, respirations unlabored   Chest Wall:    No tenderness or deformity    Heart:    Regular rate and rhythm, S1 and S2 normal, no murmur,no  rub or gallop   Abdomen:     Soft, nontender, bowel sounds active, no masses, no organomegaly    Extremities:   Extremities normal, atraumatic, no cyanosis or edema   Pulses:      Skin:   Skin is warm and dry, cellulitis on " right lower extremity   Neurologic:   no focal deficits noted      Lab Results (last 72 hours)       Procedure Component Value Units Date/Time    Basic Metabolic Panel [[id number]]  (Abnormal) Collected: 07/28/24 0535    Specimen: Blood Updated: 07/28/24 0611     Glucose 118 mg/dL      BUN 9 mg/dL      Creatinine 0.67 mg/dL      Sodium 136 mmol/L      Potassium 3.9 mmol/L      Chloride 105 mmol/L      CO2 24.0 mmol/L      Calcium 8.0 mg/dL      BUN/Creatinine Ratio 13.4     Anion Gap 7.0 mmol/L      eGFR 99.0 mL/min/1.73     Narrative:      GFR Normal >60  Chronic Kidney Disease <60  Kidney Failure <15      Magnesium [[id number]]  (Normal) Collected: 07/28/24 0535    Specimen: Blood Updated: 07/28/24 0611     Magnesium 1.9 mg/dL     Phosphorus [[id number]]  (Abnormal) Collected: 07/28/24 0535    Specimen: Blood Updated: 07/28/24 0611     Phosphorus 2.1 mg/dL     CBC (No Diff) [[id number]]  (Abnormal) Collected: 07/28/24 0535    Specimen: Blood Updated: 07/28/24 0607     WBC 12.72 10*3/mm3      RBC 3.63 10*6/mm3      Hemoglobin 10.8 g/dL      Hematocrit 33.2 %      MCV 91.5 fL      MCH 29.8 pg      MCHC 32.5 g/dL      RDW 12.4 %      RDW-SD 41.2 fl      MPV 9.0 fL      Platelets 194 10*3/mm3     Hemoglobin A1c [[id number]]  (Normal) Collected: 07/28/24 0536    Specimen: Blood Updated: 07/28/24 0604     Hemoglobin A1C 5.10 %     Narrative:      Hemoglobin A1C Ranges:    Increased Risk for Diabetes  5.7% to 6.4%  Diabetes                     >= 6.5%  Diabetic Goal                < 7.0%    Blood Culture - Blood, Arm, Right [[id number]] Collected: 07/27/24 1931    Specimen: Blood from Arm, Right Updated: 07/27/24 1937    Blood Culture - Blood, Arm, Left [[id number]] Collected: 07/27/24 1931    Specimen: Blood from Arm, Left Updated: 07/27/24 1937    Comprehensive Metabolic Panel [[id number]]  (Abnormal) Collected: 07/27/24 1833    Specimen: Blood Updated: 07/27/24 1905     Glucose 120 mg/dL      BUN 17 mg/dL      Creatinine 0.75  mg/dL      Sodium 138 mmol/L      Potassium 3.8 mmol/L      Chloride 103 mmol/L      CO2 22.0 mmol/L      Calcium 8.9 mg/dL      Total Protein 7.3 g/dL      Albumin 4.3 g/dL      ALT (SGPT) 18 U/L      AST (SGOT) 16 U/L      Alkaline Phosphatase 67 U/L      Total Bilirubin 0.5 mg/dL      Globulin 3.0 gm/dL      A/G Ratio 1.4 g/dL      BUN/Creatinine Ratio 22.7     Anion Gap 13.0 mmol/L      eGFR 90.1 mL/min/1.73     Narrative:      GFR Normal >60  Chronic Kidney Disease <60  Kidney Failure <15      Lactic Acid, Plasma [[id number]]  (Normal) Collected: 07/27/24 1833    Specimen: Blood Updated: 07/27/24 1859     Lactate 2.0 mmol/L     [place] Draw [[id number]] Collected: 07/27/24 1833    Specimen: Blood Updated: 07/27/24 1845    Narrative:      The following orders were created for panel order [place] Draw.  Procedure                               Abnormality         Status                     ---------                               -----------         ------                     Green Top (Gel)[[id number]]                                  Final result               Lavender Top[[id number]]                                     Final result               Gold Top - SST[[id number]]                                   Final result               Light Blue Top[[id number]]                                   Final result                 Please view results for these tests on the individual orders.    Green Top (Gel) [[id number]] Collected: 07/27/24 1833    Specimen: Blood Updated: 07/27/24 1845     Extra Tube Hold for add-ons.     Comment: Auto resulted.       Lavender Top [[id number]] Collected: 07/27/24 1833    Specimen: Blood Updated: 07/27/24 1845     Extra Tube hold for add-on     Comment: Auto resulted       Gold Top - SST [[id number]] Collected: 07/27/24 1833    Specimen: Blood Updated: 07/27/24 1845     Extra Tube Hold for add-ons.     Comment: Auto resulted.       Light Blue Top [[id number]] Collected: 07/27/24 1833    Specimen:  Blood Updated: 07/27/24 1845     Extra Tube Hold for add-ons.     Comment: Auto resulted       CBC & Differential [[id number]]  (Abnormal) Collected: 07/27/24 1833    Specimen: Blood Updated: 07/27/24 1845    Narrative:      The following orders were created for panel order CBC & Differential.  Procedure                               Abnormality         Status                     ---------                               -----------         ------                     CBC Auto Differential[[id number]]        Abnormal            Final result                 Please view results for these tests on the individual orders.    CBC Auto Differential [[id number]]  (Abnormal) Collected: 07/27/24 1833    Specimen: Blood Updated: 07/27/24 1845     WBC 18.02 10*3/mm3      RBC 4.35 10*6/mm3      Hemoglobin 13.2 g/dL      Hematocrit 39.6 %      MCV 91.0 fL      MCH 30.3 pg      MCHC 33.3 g/dL      RDW 12.1 %      RDW-SD 40.2 fl      MPV 8.9 fL      Platelets 267 10*3/mm3      Neutrophil % 92.0 %      Lymphocyte % 4.3 %      Monocyte % 2.8 %      Eosinophil % 0.3 %      Basophil % 0.2 %      Immature Grans % 0.4 %      Neutrophils, Absolute 16.57 10*3/mm3      Lymphocytes, Absolute 0.78 10*3/mm3      Monocytes, Absolute 0.50 10*3/mm3      Eosinophils, Absolute 0.05 10*3/mm3      Basophils, Absolute 0.04 10*3/mm3      Immature Grans, Absolute 0.08 10*3/mm3      nRBC 0.0 /100 WBC           Data Review:  Results from last 7 days   Lab Units 07/30/24  0550 07/29/24  0819 07/28/24  0535   SODIUM mmol/L 137 140 136   POTASSIUM mmol/L 3.8 4.0 3.9   CHLORIDE mmol/L 102 109* 105   CO2 mmol/L 27.0 24.0 24.0   BUN mg/dL 7* 7* 9   CREATININE mg/dL 0.60 0.65 0.67   GLUCOSE mg/dL 100* 107* 118*   CALCIUM mg/dL 8.8 8.5* 8.0*     Results from last 7 days   Lab Units 07/30/24  0550 07/29/24  0819 07/28/24  0535   WBC 10*3/mm3 3.67 4.47 12.72*   HEMOGLOBIN g/dL 11.1* 11.3* 10.8*   HEMATOCRIT % 33.9* 33.9* 33.2*   PLATELETS 10*3/mm3 197 187 194        "  Results from last 7 days   Lab Units 07/28/24  0536   HEMOGLOBIN A1C % 5.10     No results found for: \"TROPONINT\"      Results from last 7 days   Lab Units 07/27/24  1833   ALK PHOS U/L 67   BILIRUBIN mg/dL 0.5   ALT (SGPT) U/L 18   AST (SGOT) U/L 16         Results from last 7 days   Lab Units 07/28/24  0536   HEMOGLOBIN A1C % 5.10     No results found for: \"POCGLU\"        Past Medical History:   Diagnosis Date    Anxiety     ASCUS of cervix with negative high risk HPV 09/2001    ASCUS of cervix with negative high risk HPV 09/2005    Colon polyp 2013    Depression     Essential hematuria     Gait disturbance     R foot drop    Health care maintenance 06/14/2016    Hyperlipidemia     Hypertension 2020    Irritable bowel syndrome many years ago    Postmenopausal     Vaginal delivery        Assessment:  Active Hospital Problems    Diagnosis  POA    **Sepsis [A41.9]  Yes    Cellulitis of right lower extremity [L03.115]  Yes    History of MRSA infection [Z86.14]  Yes    Hyperlipidemia [E78.5]  Yes      Resolved Hospital Problems   No resolved problems to display.       Plan:  Continue with antibiotics and await results of cultures.  Follow-up on labs.  Zofran for nausea.  Probably needs another day or 2.  May be home tomorrow if she is feeling a little bit better and finish another week to 10 days of oral antibiotics.    Billy Ricks MD  7/30/2024  14:22 EDT    "

## 2024-07-31 ENCOUNTER — READMISSION MANAGEMENT (OUTPATIENT)
Dept: CALL CENTER | Facility: HOSPITAL | Age: 64
End: 2024-07-31
Payer: COMMERCIAL

## 2024-07-31 VITALS
BODY MASS INDEX: 31.18 KG/M2 | HEIGHT: 63 IN | DIASTOLIC BLOOD PRESSURE: 94 MMHG | RESPIRATION RATE: 16 BRPM | SYSTOLIC BLOOD PRESSURE: 147 MMHG | TEMPERATURE: 98.2 F | WEIGHT: 176 LBS | OXYGEN SATURATION: 96 % | HEART RATE: 69 BPM

## 2024-07-31 LAB
ANION GAP SERPL CALCULATED.3IONS-SCNC: 10.9 MMOL/L (ref 5–15)
BASOPHILS # BLD AUTO: 0.03 10*3/MM3 (ref 0–0.2)
BASOPHILS NFR BLD AUTO: 0.7 % (ref 0–1.5)
BUN SERPL-MCNC: 6 MG/DL (ref 8–23)
BUN/CREAT SERPL: 9 (ref 7–25)
CALCIUM SPEC-SCNC: 8.7 MG/DL (ref 8.6–10.5)
CHLORIDE SERPL-SCNC: 105 MMOL/L (ref 98–107)
CO2 SERPL-SCNC: 26.1 MMOL/L (ref 22–29)
CREAT SERPL-MCNC: 0.67 MG/DL (ref 0.57–1)
DEPRECATED RDW RBC AUTO: 41 FL (ref 37–54)
EGFRCR SERPLBLD CKD-EPI 2021: 99 ML/MIN/1.73
EOSINOPHIL # BLD AUTO: 0.1 10*3/MM3 (ref 0–0.4)
EOSINOPHIL NFR BLD AUTO: 2.5 % (ref 0.3–6.2)
ERYTHROCYTE [DISTWIDTH] IN BLOOD BY AUTOMATED COUNT: 12.4 % (ref 12.3–15.4)
GLUCOSE SERPL-MCNC: 102 MG/DL (ref 65–99)
HCT VFR BLD AUTO: 37.6 % (ref 34–46.6)
HGB BLD-MCNC: 12.4 G/DL (ref 12–15.9)
IMM GRANULOCYTES # BLD AUTO: 0.02 10*3/MM3 (ref 0–0.05)
IMM GRANULOCYTES NFR BLD AUTO: 0.5 % (ref 0–0.5)
LYMPHOCYTES # BLD AUTO: 1.11 10*3/MM3 (ref 0.7–3.1)
LYMPHOCYTES NFR BLD AUTO: 27.3 % (ref 19.6–45.3)
MCH RBC QN AUTO: 30 PG (ref 26.6–33)
MCHC RBC AUTO-ENTMCNC: 33 G/DL (ref 31.5–35.7)
MCV RBC AUTO: 91 FL (ref 79–97)
MONOCYTES # BLD AUTO: 0.36 10*3/MM3 (ref 0.1–0.9)
MONOCYTES NFR BLD AUTO: 8.8 % (ref 5–12)
NEUTROPHILS NFR BLD AUTO: 2.45 10*3/MM3 (ref 1.7–7)
NEUTROPHILS NFR BLD AUTO: 60.2 % (ref 42.7–76)
NRBC BLD AUTO-RTO: 0 /100 WBC (ref 0–0.2)
PLATELET # BLD AUTO: 251 10*3/MM3 (ref 140–450)
PMV BLD AUTO: 9.3 FL (ref 6–12)
POTASSIUM SERPL-SCNC: 3.5 MMOL/L (ref 3.5–5.2)
RBC # BLD AUTO: 4.13 10*6/MM3 (ref 3.77–5.28)
SODIUM SERPL-SCNC: 142 MMOL/L (ref 136–145)
WBC NRBC COR # BLD AUTO: 4.07 10*3/MM3 (ref 3.4–10.8)

## 2024-07-31 PROCEDURE — 25810000003 SODIUM CHLORIDE 0.9 % SOLUTION: Performed by: STUDENT IN AN ORGANIZED HEALTH CARE EDUCATION/TRAINING PROGRAM

## 2024-07-31 PROCEDURE — 85025 COMPLETE CBC W/AUTO DIFF WBC: CPT | Performed by: HOSPITALIST

## 2024-07-31 PROCEDURE — 25010000002 CEFAZOLIN PER 500 MG: Performed by: HOSPITALIST

## 2024-07-31 PROCEDURE — 80048 BASIC METABOLIC PNL TOTAL CA: CPT | Performed by: HOSPITALIST

## 2024-07-31 RX ORDER — ONDANSETRON 4 MG/1
4 TABLET, FILM COATED ORAL EVERY 8 HOURS PRN
Qty: 20 TABLET | Refills: 0 | Status: SHIPPED | OUTPATIENT
Start: 2024-07-31

## 2024-07-31 RX ORDER — CEPHALEXIN 500 MG/1
500 CAPSULE ORAL 4 TIMES DAILY
Qty: 40 CAPSULE | Refills: 0 | Status: SHIPPED | OUTPATIENT
Start: 2024-07-31 | End: 2024-08-10

## 2024-07-31 RX ORDER — POTASSIUM CHLORIDE 750 MG/1
40 TABLET, FILM COATED, EXTENDED RELEASE ORAL EVERY 4 HOURS
Status: COMPLETED | OUTPATIENT
Start: 2024-07-31 | End: 2024-07-31

## 2024-07-31 RX ADMIN — Medication 10 ML: at 09:03

## 2024-07-31 RX ADMIN — POTASSIUM CHLORIDE 40 MEQ: 750 TABLET, EXTENDED RELEASE ORAL at 12:02

## 2024-07-31 RX ADMIN — SODIUM CHLORIDE 2000 MG: 900 INJECTION INTRAVENOUS at 00:11

## 2024-07-31 RX ADMIN — BUPROPION HYDROCHLORIDE 450 MG: 300 TABLET, EXTENDED RELEASE ORAL at 05:49

## 2024-07-31 RX ADMIN — SODIUM CHLORIDE 100 ML/HR: 9 INJECTION, SOLUTION INTRAVENOUS at 02:11

## 2024-07-31 RX ADMIN — PROPRANOLOL HYDROCHLORIDE 30 MG: 20 TABLET ORAL at 09:02

## 2024-07-31 RX ADMIN — SODIUM CHLORIDE 2000 MG: 900 INJECTION INTRAVENOUS at 09:00

## 2024-07-31 RX ADMIN — ATORVASTATIN CALCIUM 20 MG: 20 TABLET, FILM COATED ORAL at 09:02

## 2024-07-31 RX ADMIN — POTASSIUM CHLORIDE 40 MEQ: 750 TABLET, EXTENDED RELEASE ORAL at 09:02

## 2024-07-31 NOTE — OUTREACH NOTE
Prep Survey      Flowsheet Row Responses   [facility] patient discharged from? [place]   Is LACE score < 7 ? No   Eligibility [facility]   Date of Admission 07/27/24   Date of Discharge 07/31/24   Discharge Disposition Home or Self Care   Discharge diagnosis sepsis, Cellulitis of right lower extremity   Does the patient have one of the following disease processes/diagnoses(primary or secondary)? Sepsis   Does the patient have Home health ordered? No   Is there a DME ordered? No   Prep survey completed? Yes            Laura MARISCAL - Registered Nurse

## 2024-07-31 NOTE — PLAN OF CARE
Problem: Adult Inpatient Plan of Care  Goal: Plan of Care Review  Recent Flowsheet Documentation  Taken 7/31/2024 0346 by Janki Parker RN  Progress: no change  Plan of Care Reviewed With: patient  Patient had no complaints overnight. PRN anxiety medication given. A&O x4. IVFs continue. IV antibiotics continue. VSS. Ad gene. All needs met. Care on going.

## 2024-07-31 NOTE — DISCHARGE SUMMARY
PHYSICIAN DISCHARGE SUMMARY                                                                        [facility]    Patient Identification:  Name: Brandy El  Age: 64 y.o.  Sex: female  :  1960  MRN: [id number]  Primary Care Physician: Tiffanie Woodard MD    Admit date: 2024  Discharge date and time:2024  Discharged Condition: good    Discharge Diagnoses:  Active Hospital Problems    Diagnosis  POA    **Sepsis [A41.9]  Yes    Cellulitis of right lower extremity [L03.115]  Yes    History of MRSA infection [Z86.14]  Yes    Hyperlipidemia [E78.5]  Yes      Resolved Hospital Problems   No resolved problems to display.          PMHX:   Past Medical History:   Diagnosis Date    Anxiety     ASCUS of cervix with negative high risk HPV 2001    ASCUS of cervix with negative high risk HPV 2005    Colon polyp     Depression     Essential hematuria     Gait disturbance     R foot drop    Health care maintenance 2016    Hyperlipidemia     Hypertension 2020    Irritable bowel syndrome many years ago    Postmenopausal     Vaginal delivery      PSHX:   Past Surgical History:   Procedure Laterality Date    ANKLE SURGERY      BACK SURGERY      COLONOSCOPY  10/22/2013    tics, IH, HP    COLONOSCOPY N/A 2018    Procedure: COLONOSCOPY TO CECUM AND INTO TERMINAL ILEUM WITH COLD BIOPSIES AND COLD BIOPSY POLYPECTOMY;  Surgeon: Syd Emerson MD;  Location: [facility] ENDOSCOPY;  Service: Gastroenterology    ENDOSCOPY N/A 2018    Procedure: ESOPHAGOGASTRODUODENOSCOPY WITH COLD BIOPSIES;  Surgeon: Syd Emerson MD;  Location: [facility] ENDOSCOPY;  Service: Gastroenterology    LAPAROSCOPIC TUBAL LIGATION Bilateral     TEAR DUCT SURGERY  2017    TUBAL ABDOMINAL LIGATION         Hospital Course: Brandy El   is a 64-year-old female with a history of hypertension, hyperlipidemia, anxiety depression, MRSA infection, history of  right lower extremity cellulitis, presented to the ED complaining of pain, worsening redness and swelling and pain in right lower extremity.  Patient reports symptoms noted.  Approximately 2 weeks ago, she was seen at urgent care, was diagnosed with cellulitis, and prescribed Keflex for 7 days.  She did not improvement of antibiotics however symptoms did not completely resolve, and she continued to have redness.  Noticed worsening swelling, pain, erythema today and developed shaking chills and fever up to 100 F and decided to present to the ED.  Denies any known injury.  Did have right lower extremity cellulitis approximately 1 year ago.  Does have history of MRSA infection in her thumb.  The patient was admitted to the hospital and treated with broad-spectrum antibiotics for cellulitis on the right lower extremity.  It did seem to get better after a few days and she looked well enough to go home.  She will continue with another 10 days of oral antibiotics and follow-up with her primary care in 1 week for ongoing care.    Consults:     Consults       Date and Time Order Name Status Description    7/27/2024  7:09 PM [facility] (on-call MD unless specified) Details            Results from last 7 days   Lab Units 07/31/24  0536   WBC 10*3/mm3 4.07   HEMOGLOBIN g/dL 12.4   HEMATOCRIT % 37.6   PLATELETS 10*3/mm3 251     Results from last 7 days   Lab Units 07/31/24  0536   SODIUM mmol/L 142   POTASSIUM mmol/L 3.5   CHLORIDE mmol/L 105   CO2 mmol/L 26.1   BUN mg/dL 6*   CREATININE mg/dL 0.67   GLUCOSE mg/dL 102*   CALCIUM mg/dL 8.7     Significant Diagnostic Studies:   WBC   Date Value Ref Range Status   07/31/2024 4.07 3.40 - 10.80 10*3/mm3 Final     Hemoglobin   Date Value Ref Range Status   07/31/2024 12.4 12.0 - 15.9 g/dL Final     Hematocrit   Date Value Ref Range Status   07/31/2024 37.6 34.0 - 46.6 % Final     Platelets   Date Value Ref Range Status   07/31/2024 251 140 - 450 10*3/mm3 Final     Sodium   Date Value Ref  "Range Status   07/31/2024 142 136 - 145 mmol/L Final     Potassium   Date Value Ref Range Status   07/31/2024 3.5 3.5 - 5.2 mmol/L Final     Chloride   Date Value Ref Range Status   07/31/2024 105 98 - 107 mmol/L Final     CO2   Date Value Ref Range Status   07/31/2024 26.1 22.0 - 29.0 mmol/L Final     BUN   Date Value Ref Range Status   07/31/2024 6 (L) 8 - 23 mg/dL Final     Creatinine   Date Value Ref Range Status   07/31/2024 0.67 0.57 - 1.00 mg/dL Final     Glucose   Date Value Ref Range Status   07/31/2024 102 (H) 65 - 99 mg/dL Final     Calcium   Date Value Ref Range Status   07/31/2024 8.7 8.6 - 10.5 mg/dL Final     Phosphorus   Date Value Ref Range Status   07/29/2024 2.7 2.5 - 4.5 mg/dL Final     No results found for: \"AST\", \"ALT\", \"ALKPHOS\"  No results found for: \"APTT\", \"INR\"  No results found for: \"COLORU\", \"CLARITYU\", \"SPECGRAV\", \"PHUR\", \"PROTEINUR\", \"GLUCOSEU\", \"KETONESU\", \"BLOODU\", \"NITRITE\", \"LEUKOCYTESUR\", \"BILIRUBINUR\", \"UROBILINOGEN\", \"RBCUA\", \"WBCUA\", \"BACTERIA\", \"UACOMMENT\"  No results found for: \"TROPONINT\", \"TROPONINI\", \"BNP\"  No components found for: \"HGBA1C;2\"  No components found for: \"TSH;2\"  Imaging Results (All)       None          Lab Results (last 7 days)       Procedure Component Value Units Date/Time    Basic Metabolic Panel [[id number]]  (Abnormal) Collected: 07/31/24 0536    Specimen: Blood Updated: 07/31/24 0637     Glucose 102 mg/dL      BUN 6 mg/dL      Creatinine 0.67 mg/dL      Sodium 142 mmol/L      Potassium 3.5 mmol/L      Chloride 105 mmol/L      CO2 26.1 mmol/L      Calcium 8.7 mg/dL      BUN/Creatinine Ratio 9.0     Anion Gap 10.9 mmol/L      eGFR 99.0 mL/min/1.73     Narrative:      GFR Normal >60  Chronic Kidney Disease <60  Kidney Failure <15      CBC & Differential [[id number]]  (Normal) Collected: 07/31/24 0536    Specimen: Blood Updated: 07/31/24 0616    Narrative:      The following orders were created for panel order CBC & Differential.  Procedure                  "              Abnormality         Status                     ---------                               -----------         ------                     CBC Auto Differential[[id number]]        Normal              Final result                 Please view results for these tests on the individual orders.    CBC Auto Differential [[id number]]  (Normal) Collected: 07/31/24 0536    Specimen: Blood Updated: 07/31/24 0616     WBC 4.07 10*3/mm3      RBC 4.13 10*6/mm3      Hemoglobin 12.4 g/dL      Hematocrit 37.6 %      MCV 91.0 fL      MCH 30.0 pg      MCHC 33.0 g/dL      RDW 12.4 %      RDW-SD 41.0 fl      MPV 9.3 fL      Platelets 251 10*3/mm3      Neutrophil % 60.2 %      Lymphocyte % 27.3 %      Monocyte % 8.8 %      Eosinophil % 2.5 %      Basophil % 0.7 %      Immature Grans % 0.5 %      Neutrophils, Absolute 2.45 10*3/mm3      Lymphocytes, Absolute 1.11 10*3/mm3      Monocytes, Absolute 0.36 10*3/mm3      Eosinophils, Absolute 0.10 10*3/mm3      Basophils, Absolute 0.03 10*3/mm3      Immature Grans, Absolute 0.02 10*3/mm3      nRBC 0.0 /100 WBC     Blood Culture - Blood, Arm, Left [[id number]]  (Normal) Collected: 07/27/24 1931    Specimen: Blood from Arm, Left Updated: 07/30/24 1946     Blood Culture No growth at 3 days    Blood Culture - Blood, Arm, Right [[id number]]  (Normal) Collected: 07/27/24 1931    Specimen: Blood from Arm, Right Updated: 07/30/24 1946     Blood Culture No growth at 3 days    Basic Metabolic Panel [[id number]]  (Abnormal) Collected: 07/30/24 0550    Specimen: Blood Updated: 07/30/24 0700     Glucose 100 mg/dL      BUN 7 mg/dL      Creatinine 0.60 mg/dL      Sodium 137 mmol/L      Potassium 3.8 mmol/L      Chloride 102 mmol/L      CO2 27.0 mmol/L      Calcium 8.8 mg/dL      BUN/Creatinine Ratio 11.7     Anion Gap 8.0 mmol/L      eGFR 101.6 mL/min/1.73     Narrative:      GFR Normal >60  Chronic Kidney Disease <60  Kidney Failure <15      CBC & Differential [[id number]]  (Abnormal) Collected: 07/30/24  0550    Specimen: Blood Updated: 07/30/24 0642    Narrative:      The following orders were created for panel order CBC & Differential.  Procedure                               Abnormality         Status                     ---------                               -----------         ------                     CBC Auto Differential[[id number]]        Abnormal            Final result                 Please view results for these tests on the individual orders.    CBC Auto Differential [[id number]]  (Abnormal) Collected: 07/30/24 0550    Specimen: Blood Updated: 07/30/24 0642     WBC 3.67 10*3/mm3      RBC 3.72 10*6/mm3      Hemoglobin 11.1 g/dL      Hematocrit 33.9 %      MCV 91.1 fL      MCH 29.8 pg      MCHC 32.7 g/dL      RDW 12.0 %      RDW-SD 40.0 fl      MPV 9.5 fL      Platelets 197 10*3/mm3      Neutrophil % 60.1 %      Lymphocyte % 26.2 %      Monocyte % 9.3 %      Eosinophil % 3.3 %      Basophil % 0.8 %      Immature Grans % 0.3 %      Neutrophils, Absolute 2.21 10*3/mm3      Lymphocytes, Absolute 0.96 10*3/mm3      Monocytes, Absolute 0.34 10*3/mm3      Eosinophils, Absolute 0.12 10*3/mm3      Basophils, Absolute 0.03 10*3/mm3      Immature Grans, Absolute 0.01 10*3/mm3      nRBC 0.0 /100 WBC     Phosphorus [[id number]]  (Normal) Collected: 07/29/24 1808    Specimen: Blood Updated: 07/29/24 1854     Phosphorus 2.7 mg/dL     Vancomycin, Trough [[id number]]  (Normal) Collected: 07/29/24 0819    Specimen: Blood Updated: 07/29/24 0904     Vancomycin Trough 6.80 mcg/mL     Narrative:      Therapeutic Ranges for Vancomycin    Vancomycin Random   5.0-40.0 mcg/mL  Vancomycin Trough   5.0-20.0 mcg/mL  Vancomycin Peak     20.0-40.0 mcg/mL    Phosphorus [[id number]]  (Abnormal) Collected: 07/29/24 0819    Specimen: Blood Updated: 07/29/24 0903     Phosphorus 2.3 mg/dL     Basic Metabolic Panel [[id number]]  (Abnormal) Collected: 07/29/24 0819    Specimen: Blood Updated: 07/29/24 0903     Glucose 107 mg/dL      BUN 7 mg/dL       Creatinine 0.65 mg/dL      Sodium 140 mmol/L      Potassium 4.0 mmol/L      Chloride 109 mmol/L      CO2 24.0 mmol/L      Calcium 8.5 mg/dL      BUN/Creatinine Ratio 10.8     Anion Gap 7.0 mmol/L      eGFR 99.7 mL/min/1.73     Narrative:      GFR Normal >60  Chronic Kidney Disease <60  Kidney Failure <15      CBC & Differential [[id number]]  (Abnormal) Collected: 07/29/24 0819    Specimen: Blood Updated: 07/29/24 0853    Narrative:      The following orders were created for panel order CBC & Differential.  Procedure                               Abnormality         Status                     ---------                               -----------         ------                     CBC Auto Differential[[id number]]        Abnormal            Final result                 Please view results for these tests on the individual orders.    CBC Auto Differential [[id number]]  (Abnormal) Collected: 07/29/24 0819    Specimen: Blood Updated: 07/29/24 0853     WBC 4.47 10*3/mm3      RBC 3.67 10*6/mm3      Hemoglobin 11.3 g/dL      Hematocrit 33.9 %      MCV 92.4 fL      MCH 30.8 pg      MCHC 33.3 g/dL      RDW 12.5 %      RDW-SD 41.7 fl      MPV 9.3 fL      Platelets 187 10*3/mm3      Neutrophil % 75.0 %      Lymphocyte % 15.9 %      Monocyte % 6.0 %      Eosinophil % 1.8 %      Basophil % 0.9 %      Immature Grans % 0.4 %      Neutrophils, Absolute 3.35 10*3/mm3      Lymphocytes, Absolute 0.71 10*3/mm3      Monocytes, Absolute 0.27 10*3/mm3      Eosinophils, Absolute 0.08 10*3/mm3      Basophils, Absolute 0.04 10*3/mm3      Immature Grans, Absolute 0.02 10*3/mm3      nRBC 0.0 /100 WBC     Phosphorus [[id number]]  (Abnormal) Collected: 07/28/24 1741    Specimen: Blood Updated: 07/28/24 1816     Phosphorus 2.2 mg/dL     MRSA Screen, PCR (Inpatient) - Swab, Nares [[id number]]  (Normal) Collected: 07/28/24 1404    Specimen: Swab from Nares Updated: 07/28/24 1538     MRSA PCR No MRSA Detected    Narrative:      The negative predictive  value of this diagnostic test is high and should only be used to consider de-escalating anti-MRSA therapy. A positive result may indicate colonization with MRSA and must be correlated clinically.    Basic Metabolic Panel [[id number]]  (Abnormal) Collected: 07/28/24 0535    Specimen: Blood Updated: 07/28/24 0611     Glucose 118 mg/dL      BUN 9 mg/dL      Creatinine 0.67 mg/dL      Sodium 136 mmol/L      Potassium 3.9 mmol/L      Chloride 105 mmol/L      CO2 24.0 mmol/L      Calcium 8.0 mg/dL      BUN/Creatinine Ratio 13.4     Anion Gap 7.0 mmol/L      eGFR 99.0 mL/min/1.73     Narrative:      GFR Normal >60  Chronic Kidney Disease <60  Kidney Failure <15      Magnesium [[id number]]  (Normal) Collected: 07/28/24 0535    Specimen: Blood Updated: 07/28/24 0611     Magnesium 1.9 mg/dL     Phosphorus [[id number]]  (Abnormal) Collected: 07/28/24 0535    Specimen: Blood Updated: 07/28/24 0611     Phosphorus 2.1 mg/dL     CBC (No Diff) [[id number]]  (Abnormal) Collected: 07/28/24 0535    Specimen: Blood Updated: 07/28/24 0607     WBC 12.72 10*3/mm3      RBC 3.63 10*6/mm3      Hemoglobin 10.8 g/dL      Hematocrit 33.2 %      MCV 91.5 fL      MCH 29.8 pg      MCHC 32.5 g/dL      RDW 12.4 %      RDW-SD 41.2 fl      MPV 9.0 fL      Platelets 194 10*3/mm3     Hemoglobin A1c [[id number]]  (Normal) Collected: 07/28/24 0536    Specimen: Blood Updated: 07/28/24 0604     Hemoglobin A1C 5.10 %     Narrative:      Hemoglobin A1C Ranges:    Increased Risk for Diabetes  5.7% to 6.4%  Diabetes                     >= 6.5%  Diabetic Goal                < 7.0%    Comprehensive Metabolic Panel [[id number]]  (Abnormal) Collected: 07/27/24 1833    Specimen: Blood Updated: 07/27/24 1905     Glucose 120 mg/dL      BUN 17 mg/dL      Creatinine 0.75 mg/dL      Sodium 138 mmol/L      Potassium 3.8 mmol/L      Chloride 103 mmol/L      CO2 22.0 mmol/L      Calcium 8.9 mg/dL      Total Protein 7.3 g/dL      Albumin 4.3 g/dL      ALT (SGPT) 18 U/L      AST  (SGOT) 16 U/L      Alkaline Phosphatase 67 U/L      Total Bilirubin 0.5 mg/dL      Globulin 3.0 gm/dL      A/G Ratio 1.4 g/dL      BUN/Creatinine Ratio 22.7     Anion Gap 13.0 mmol/L      eGFR 90.1 mL/min/1.73     Narrative:      GFR Normal >60  Chronic Kidney Disease <60  Kidney Failure <15      Lactic Acid, Plasma [[id number]]  (Normal) Collected: 07/27/24 1833    Specimen: Blood Updated: 07/27/24 1859     Lactate 2.0 mmol/L     [place] Draw [[id number]] Collected: 07/27/24 1833    Specimen: Blood Updated: 07/27/24 1845    Narrative:      The following orders were created for panel order [place] Draw.  Procedure                               Abnormality         Status                     ---------                               -----------         ------                     Green Top (Gel)[[id number]]                                  Final result               Lavender Top[[id number]]                                     Final result               Gold Top - SST[[id number]]                                   Final result               Light Blue Top[[id number]]                                   Final result                 Please view results for these tests on the individual orders.    Green Top (Gel) [[id number]] Collected: 07/27/24 1833    Specimen: Blood Updated: 07/27/24 1845     Extra Tube Hold for add-ons.     Comment: Auto resulted.       Lavender Top [[id number]] Collected: 07/27/24 1833    Specimen: Blood Updated: 07/27/24 1845     Extra Tube hold for add-on     Comment: Auto resulted       Gold Top - SST [[id number]] Collected: 07/27/24 1833    Specimen: Blood Updated: 07/27/24 1845     Extra Tube Hold for add-ons.     Comment: Auto resulted.       Light Blue Top [[id number]] Collected: 07/27/24 1833    Specimen: Blood Updated: 07/27/24 1845     Extra Tube Hold for add-ons.     Comment: Auto resulted       CBC & Differential [[id number]]  (Abnormal) Collected: 07/27/24 1833    Specimen: Blood Updated: 07/27/24 1845  "   Narrative:      The following orders were created for panel order CBC & Differential.  Procedure                               Abnormality         Status                     ---------                               -----------         ------                     CBC Auto Differential[[id number]]        Abnormal            Final result                 Please view results for these tests on the individual orders.    CBC Auto Differential [[id number]]  (Abnormal) Collected: 07/27/24 1833    Specimen: Blood Updated: 07/27/24 1845     WBC 18.02 10*3/mm3      RBC 4.35 10*6/mm3      Hemoglobin 13.2 g/dL      Hematocrit 39.6 %      MCV 91.0 fL      MCH 30.3 pg      MCHC 33.3 g/dL      RDW 12.1 %      RDW-SD 40.2 fl      MPV 8.9 fL      Platelets 267 10*3/mm3      Neutrophil % 92.0 %      Lymphocyte % 4.3 %      Monocyte % 2.8 %      Eosinophil % 0.3 %      Basophil % 0.2 %      Immature Grans % 0.4 %      Neutrophils, Absolute 16.57 10*3/mm3      Lymphocytes, Absolute 0.78 10*3/mm3      Monocytes, Absolute 0.50 10*3/mm3      Eosinophils, Absolute 0.05 10*3/mm3      Basophils, Absolute 0.04 10*3/mm3      Immature Grans, Absolute 0.08 10*3/mm3      nRBC 0.0 /100 WBC           /94 (BP Location: Right arm, Patient Position: Lying)   Pulse 69   Temp 98.2 °F (36.8 °C) (Oral)   Resp 16   Ht 160 cm (63\")   Wt 79.8 kg (176 lb)   SpO2 96%   BMI 31.18 kg/m²     Discharge Exam:  General Appearance:    Alert, cooperative, no distress                          Head:    Normocephalic, without obvious abnormality, atraumatic                          Eyes:                            Throat:   Lips, tongue, gums normal                          Neck:   Supple, symmetrical, trachea midline, no JVD                        Lungs:     Clear to auscultation bilaterally, respirations unlabored                Chest Wall:    No tenderness or deformity                        Heart:    Regular rate and rhythm, S1 and S2 normal, no murmur,no  " Rub  or gallop                  Abdomen:     Soft, non-tender, bowel sounds active, no masses, no                                                        organomegaly                  Extremities:   Extremities normal, atraumatic, no cyanosis or edema                             Skin:   Skin is warm and dry,  no rashes or palpable lesions                  Neurologic:   no focal deficits noted     Disposition:  Home    Activity as tolerated    Diet as tolerated  Diet Order   Procedures    Diet: Regular/House; Fluid Consistency: Thin (IDDSI 0)       Patient Instructions:      Discharge Medications        New Medications        Instructions Start Date   cephalexin 500 MG capsule  Commonly known as: Keflex   500 mg, Oral, 4 Times Daily      ondansetron 4 MG tablet  Commonly known as: Zofran   4 mg, Oral, Every 8 Hours PRN             Continue These Medications        Instructions Start Date   amLODIPine 2.5 MG tablet  Commonly known as: NORVASC   2.5 mg, Oral, Daily      atorvastatin 20 MG tablet  Commonly known as: LIPITOR   20 mg, Oral, Daily      buPROPion  MG 24 hr tablet  Commonly known as: WELLBUTRIN XL   450 mg, Oral, Every Morning      buPROPion  MG 24 hr tablet  Commonly known as: WELLBUTRIN XL       cetirizine 10 MG tablet  Commonly known as: zyrTEC   10 mg, Oral, Daily      clonazePAM 0.5 MG tablet  Commonly known as: KlonoPIN   0.5 mg, Oral, 2 Times Daily PRN      clonazePAM 1 MG tablet  Commonly known as: KlonoPIN       escitalopram 10 MG tablet  Commonly known as: LEXAPRO   No dose, route, or frequency recorded.      fluticasone 50 MCG/ACT nasal spray  Commonly known as: FLONASE   2 sprays, Nasal, Daily      omega-3 acid ethyl esters 1 g capsule  Commonly known as: LOVAZA   2 g, Oral, 2 Times Daily      propranolol LA 60 MG 24 hr capsule  Commonly known as: INDERAL LA   60 mg, Oral, Daily      traZODone 50 MG tablet  Commonly known as: DESYREL   No dose, route, or frequency recorded.       valsartan 320 MG tablet  Commonly known as: DIOVAN   TAKE ONE TABLET BY MOUTH DAILY             Future Appointments   Date Time Provider Department Center   8/5/2024 10:00 AM Tiffanie Woodard MD [facility]      Follow-up Information       Tiffanie Woodard MD Follow up in 1 week(s).    Specialty: Internal Medicine  Contact information:  [address]  [facility] [address]  [phone]               Tiffanie Woodard MD .    Specialty: Internal Medicine  Contact information:  [address]  [place] [address]  [phone]                           Discharge Order (From admission, onward)       Start     Ordered    07/31/24 1150  Discharge patient  Once        Expected Discharge Date: 07/31/24   Discharge Disposition: Home or Self Care   Physician of Record for Attribution - Please select from Treatment Team: RADHA RICKS [[id number]]   Review needed by CMO to determine Physician of Record: No      Question Answer Comment   Physician of Record for Attribution - Please select from Treatment Team RADHA RICKS    Review needed by CMO to determine Physician of Record No        07/31/24 1152                    Total time spent discharging patient including evaluation,post hospitalization follow up,  medication and post hospitalization instructions and education total time exceeds 30 minutes.    Signed:  Radha Ricks MD  7/31/2024  11:52 EDT

## 2024-07-31 NOTE — PLAN OF CARE
Goal Outcome Evaluation:           Progress: no change  Outcome Evaluation: A&Ox4. RA. Switched to PO abx for discharge. Education provided. Redness improved in RLE. K+ replaced per MAR. VSS. Daughter on way to transport home.

## 2024-07-31 NOTE — PROGRESS NOTES
Case Management Discharge Note      Final Note: Dc'd home               Transportation Services  Private: Car    Final Discharge Disposition Code: 01 - home or self-care

## 2024-08-01 ENCOUNTER — TRANSITIONAL CARE MANAGEMENT TELEPHONE ENCOUNTER (OUTPATIENT)
Dept: CALL CENTER | Facility: HOSPITAL | Age: 64
End: 2024-08-01
Payer: COMMERCIAL

## 2024-08-01 LAB
BACTERIA SPEC AEROBE CULT: NORMAL
BACTERIA SPEC AEROBE CULT: NORMAL

## 2024-08-01 NOTE — PAYOR COMM NOTE
"Shaw Inman (64 y.o. Female)        PLEASE SEE ATTACHED DC SUMMARY    REF#[id number]    THANK YOU    MIAN TYLER LPN CCP   Date of Birth   1960    Social Security Number       Address   [address] [facility] [address]    Home Phone   [phone]    MRN   [id number]       [place]   Orthodox    Marital Status                               Admission Date   7/27/24    Admission Type   Emergency    Admitting Provider   Addis Zavala MD    Attending Provider       Department, Room/Bed   [address], S[address]/1       Discharge Date   7/31/2024    Discharge Disposition   Home or Self Care    Discharge Destination                                 Attending Provider: (none)   Allergies: Phenergan [Promethazine Hcl]    Isolation: None   Infection: None   Code Status: Prior    Ht: 160 cm (63\")   Wt: 79.8 kg (176 lb)    Admission Cmt: None   Principal Problem: Sepsis [A41.9]                   Active Insurance as of 7/27/2024       Primary Coverage       Payor Plan Insurance Group Employer/Plan Group    [employer] [facility] [employer] [id number]       Payor Plan Address Payor Plan Phone Number Payor Plan Fax Number Effective Dates    [address] [phone]  1/1/2024 - None Entered    [place] [address]         Subscriber Name Subscriber Birth Date Member ID       SHAW INMAN 1960 [id number]                     Emergency Contacts        (Rel.) Home Phone Work Phone Mobile Phone    kirill inman (Daughter) -- -- [phone]              Discharge Summary    No notes of this type exist for this encounter.       Discharge Order (From admission, onward)       Start     Ordered    07/31/24 1150  Discharge patient  Once        Expected Discharge Date: 07/31/24   Discharge Disposition: Home or Self Care   Physician of Record for Attribution - Please select from Treatment Team: RADHA WHITMORE [[id number]]   Review needed by CMO to " determine Physician of Record: No      Question Answer Comment   Physician of Record for Attribution - Please select from Treatment Team RADHA WHITMORE    Review needed by CMO to determine Physician of Record No        07/31/24 5782

## 2024-08-01 NOTE — OUTREACH NOTE
Call Center TCM Note      Flowsheet Row Responses   [facility] patient discharged from? [facility]   Does the patient have one of the following disease processes/diagnoses(primary or secondary)? Sepsis   TCM attempt successful? Yes   Call start time 1455   Call end time 1457   Discharge diagnosis sepsis, Cellulitis of right lower extremity   Is patient permission given to speak with other caregiver? No   Person spoke with today (if not patient) and relationship Patient   Medication alerts for this patient Keflex, Zofran   Meds reviewed with patient/caregiver? Yes   Is the patient having any side effects they believe may be caused by any medication additions or changes? No   Does the patient have all medications related to this admission filled (includes all antibiotics, inhalers, nebulizers,steroids,etc.) Yes   Is the patient taking all medications as directed (includes completed medication regime)? Yes   Comments Patient has a office visit, 15 minutes, on 8/5/24 at 10:00 AM with Dr. Tiffanie Woodard. Will message PCP office to change to Hospital f/u appt if possible.   Does the patient have an appointment with their PCP within 7-14 days of discharge? Yes   Has home health visited the patient within 72 hours of discharge? N/A   Psychosocial issues? No   Comments patient states she is doing very well. Her leg is improved, less redness and swelling.   Did the patient receive a copy of their discharge instructions? Yes   Nursing interventions Reviewed instructions with patient   What is the patient's perception of their health status since discharge? Improving   Nursing interventions Nurse provided patient education   Is the patient/caregiver able to teach back TIME? T emperature - higher or lower than normal, I nfection - may have signs and symptoms of an infection, M ental Decline - confused, sleepy, difficult to arouse, E xtremely Ill - severe pain, discomfort, shortness of breath   Nursing interventions Nurse  provided reassurance to patient   Is patient/caregiver able to teach back steps to recovery at home? Set small, achievable goals for return to baseline health, Rest and regain strength   Is the patient/caregiver able to teach back signs and symptoms of worsening condition: Fever, Rapid heart rate (>90), Shortness of breath/rapid respiratory rate, Altered mental status(confusion/coma), Edema   If the patient is a current smoker, are they able to teach back resources for cessation? Not a smoker   Is the patient/caregiver able to teach back the hierarchy of who to call/visit for symptoms/problems? PCP, Specialist, Home health nurse, Urgent Care, ED, 911 Yes   TCM call completed? Yes   Wrap up additional comments Patient has a office visit, 15 minutes, on 8/5/24 at 10:00 AM with Dr. Tiffnaie Woodard. Will message PCP office to change to Hospital f/u appt if possible.   Call end time 1825   Would this patient benefit from a Referral to [facility]? No   Is the patient interested in additional calls from an ambulatory ? No            Susannah Pavon RN    8/1/2024, 14:58 EDT

## 2024-08-05 ENCOUNTER — OFFICE VISIT (OUTPATIENT)
Dept: INTERNAL MEDICINE | Facility: CLINIC | Age: 64
End: 2024-08-05
Payer: COMMERCIAL

## 2024-08-05 VITALS
WEIGHT: 174 LBS | OXYGEN SATURATION: 97 % | HEART RATE: 56 BPM | DIASTOLIC BLOOD PRESSURE: 74 MMHG | HEIGHT: 63 IN | SYSTOLIC BLOOD PRESSURE: 108 MMHG | BODY MASS INDEX: 30.83 KG/M2

## 2024-08-05 DIAGNOSIS — L03.90 RECURRENT CELLULITIS: Primary | ICD-10-CM

## 2024-08-05 PROCEDURE — 99213 OFFICE O/P EST LOW 20 MIN: CPT | Performed by: INTERNAL MEDICINE

## 2024-08-05 RX ORDER — SACCHAROMYCES BOULARDII 250 MG
250 CAPSULE ORAL 2 TIMES DAILY
Qty: 30 CAPSULE | Refills: 3 | Status: SHIPPED | OUTPATIENT
Start: 2024-08-05

## 2024-08-05 NOTE — PROGRESS NOTES
Transitional Care Follow Up Visit  Subjective     Brandy El is a 64 y.o. female who presents for a transitional care management visit.    Within 48 business hours after discharge our office contacted her via telephone to coordinate her care and needs.      I reviewed and discussed the details of that call along with the discharge summary, hospital problems, inpatient lab results, inpatient diagnostic studies, and consultation reports with Brandy.     Current outpatient and discharge medications have been reconciled for the patient.  Reviewed by: Tiffanie Woodard MD          7/31/2024     7:08 PM   Date of TCM Phone Call   [facility]   Date of Admission 7/27/2024   Date of Discharge 7/31/2024   Discharge Disposition Home or Self Care     Risk for Readmission (LACE) Score: 7 (7/31/2024  6:00 AM)      History of Present Illness   Course During Hospital Stay:  patient admitted on 7/27/24 and discharged on 7/31/24. She was diagnosed with cellulitis. She failed out patient antibiotic therapy. Admitted to the hospital and treated w broad spectrum antibiotics. She is on an additional 10 day antibiotic course from discharge. Of note patient has a history of cellulitis at same location in the past as well as a remote prior mrsa infection of her thumb.   Wbc on admission 18. 4 at discharge.       The following portions of the patient's history were reviewed and updated as appropriate: allergies, current medications, past family history, past medical history, past social history, past surgical history, and problem list.    Review of Systems   Constitutional: Negative.    HENT: Negative.     Eyes: Negative.    Respiratory: Negative.     Cardiovascular: Negative.    Gastrointestinal: Negative.    Endocrine: Negative.    Genitourinary: Negative.    Musculoskeletal: Negative.    Skin: Negative.    Allergic/Immunologic: Negative.    Neurological: Negative.    Hematological: Negative.   "  Psychiatric/Behavioral: Negative.         Objective   /74   Pulse 56   Ht 160 cm (63\")   Wt 78.9 kg (174 lb)   SpO2 97%   BMI 30.82 kg/m²   Physical Exam  Vitals and nursing note reviewed.   Constitutional:       Appearance: She is well-developed.   HENT:      Head: Normocephalic and atraumatic.      Right Ear: External ear normal.      Left Ear: External ear normal.      Nose: Nose normal.   Eyes:      Pupils: Pupils are equal, round, and reactive to light.   Cardiovascular:      Rate and Rhythm: Normal rate and regular rhythm.      Heart sounds: Normal heart sounds.   Pulmonary:      Effort: Pulmonary effort is normal. No respiratory distress.      Breath sounds: Normal breath sounds.   Abdominal:      Palpations: Abdomen is soft.   Musculoskeletal:         General: Normal range of motion.      Cervical back: Neck supple.   Skin:     General: Skin is warm and dry.   Neurological:      Mental Status: She is alert and oriented to person, place, and time.   Psychiatric:         Behavior: Behavior normal.         Assessment & Plan   Diagnoses and all orders for this visit:    1. Recurrent cellulitis (Primary)  -     Ambulatory Referral to Infectious Disease    Patient w cellulitis anterior lower extremity. She has had similar episode before. Will refer to id to determine best preventative measures for another episode. She will keep area clean and will hydrate well w aquaphore or lotion. She is to elvate if swelling but will likely have a size diefference noted between her ankles at all times. Reviewed labs and hospital records. She is to f/u if signs of recurrence. Advise probiotic w all antibiotic usage. Florastor sent. F/u routinely or prn.                  "

## 2024-08-09 ENCOUNTER — READMISSION MANAGEMENT (OUTPATIENT)
Dept: CALL CENTER | Facility: HOSPITAL | Age: 64
End: 2024-08-09
Payer: COMMERCIAL

## 2024-08-09 NOTE — OUTREACH NOTE
Sepsis Week 1 Survey      Flowsheet Row Responses   List of hospitals in [place] patient discharged from? [facility]   Does the patient have one of the following disease processes/diagnoses(primary or secondary)? Sepsis   Call start time 0857   Call end time 0859   Discharge diagnosis sepsis, Cellulitis of right lower extremity   Meds reviewed with patient/caregiver? Yes   Is the patient taking all medications as directed (includes completed medication regime)? Yes   Does the patient have a primary care provider?  Yes   Does the patient have an appointment with their PCP within 7 days of discharge? Yes   Has the patient kept scheduled appointments due by today? Yes   Has home health visited the patient within 72 hours of discharge? N/A   Psychosocial issues? No   Did the patient receive a copy of their discharge instructions? Yes   Nursing interventions Reviewed instructions with patient   What is the patient's perception of their health status since discharge? Improving   Is the patient/caregiver able to teach back TIME? I nfection - may have signs and symptoms of an infection, M ental Decline - confused, sleepy, difficult to arouse, T emperature - higher or lower than normal, E xtremely Ill - severe pain, discomfort, shortness of breath   Nursing interventions Nurse provided patient education   Is patient/caregiver able to teach back steps to recovery at home? Set small, achievable goals for return to baseline health, Eat a balanced diet   Is the patient/caregiver able to teach back signs and symptoms of worsening condition: Fever, Hyperthermia, Rapid heart rate (>90), Shortness of breath/rapid respiratory rate, Altered mental status(confusion/coma)   Is the patient/caregiver able to teach back the hierarchy of who to call/visit for symptoms/problems? PCP, Specialist, Home health nurse, Urgent Care, ED, 911 Yes   Graduated Yes   Graduated/Revoked comments Pt reports she is doing better and has went to PCP appt. She will  finish ABT this weekend.   Call end time 1864            BLAKE SETHI - Registered Nurse

## 2024-08-23 ENCOUNTER — OFFICE VISIT (OUTPATIENT)
Dept: INFECTIOUS DISEASES | Facility: CLINIC | Age: 64
End: 2024-08-23
Payer: COMMERCIAL

## 2024-08-23 VITALS
BODY MASS INDEX: 31.35 KG/M2 | RESPIRATION RATE: 20 BRPM | WEIGHT: 177 LBS | HEART RATE: 54 BPM | SYSTOLIC BLOOD PRESSURE: 149 MMHG | TEMPERATURE: 97.1 F | DIASTOLIC BLOOD PRESSURE: 82 MMHG

## 2024-08-23 DIAGNOSIS — Z86.14 HISTORY OF MRSA INFECTION: ICD-10-CM

## 2024-08-23 DIAGNOSIS — E66.9 OBESITY (BMI 30-39.9): ICD-10-CM

## 2024-08-23 DIAGNOSIS — L03.90 RECURRENT CELLULITIS: Primary | ICD-10-CM

## 2024-08-23 PROCEDURE — 99204 OFFICE O/P NEW MOD 45 MIN: CPT | Performed by: INTERNAL MEDICINE

## 2024-08-23 NOTE — PROGRESS NOTES
"Referring Provider: Tiffanie Woodard MD  [address]  [place] [address]    Reason for Consultation: recurrent RLE cellulitis    History of present illness:  Brandy El is a 64 y.o. who I am asked to evaluate and give opinion for recurrent cellulitis. History is obtained from the patient and review of the old medical records which I summarize/synthesize as follows: She was recently admitted to the hospital from 7/27 - 7/31/2024 due to right lower extremity cellulitis.  Prior to this she had been treated with cephalexin as an outpatient with improvement but not full resolution.  In the hospital she was given \"broad-spectrum IV antibiotics\" per my review of the discharge summary.  She improved and was discharged on cephalexin 500 mg p.o. every 6 hours to complete a 10-day course.  She states the area has now fully resolved.    Of note, she says she had a similar episode about one year prior to this.  She also has a distant history of MRSA infection in her thumb. She also has a history of R ankle tendon surgery in 2017.  For this reason, her right ankle and foot swell slightly more than the left foot does.  There is no hardware in the foot or ankle.    Past Medical History:   Diagnosis Date    Anxiety     ASCUS of cervix with negative high risk HPV 09/2001    ASCUS of cervix with negative high risk HPV 09/2005    Colon polyp 2013    Depression     Essential hematuria     Gait disturbance     R foot drop    Health care maintenance 06/14/2016    Hyperlipidemia     Hypertension 2020    Irritable bowel syndrome many years ago    Postmenopausal     Vaginal delivery        Past Surgical History:   Procedure Laterality Date    ANKLE SURGERY      BACK SURGERY      COLONOSCOPY  10/22/2013    tics, IH, HP    COLONOSCOPY N/A 12/03/2018    Procedure: COLONOSCOPY TO CECUM AND INTO TERMINAL ILEUM WITH COLD BIOPSIES AND COLD BIOPSY POLYPECTOMY;  Surgeon: Syd Emerson MD;  Location: [facility] ENDOSCOPY;  Service: " Gastroenterology    ENDOSCOPY N/A 12/03/2018    Procedure: ESOPHAGOGASTRODUODENOSCOPY WITH COLD BIOPSIES;  Surgeon: Syd Emerson MD;  Location: [facility] ENDOSCOPY;  Service: Gastroenterology    LAPAROSCOPIC TUBAL LIGATION Bilateral     TEAR DUCT SURGERY  06/2017    TUBAL ABDOMINAL LIGATION         Social History:  Lives in [place], [place]  Retired from childcare    Non-smoker    Antibiotic allergies and intolerances:  None    Medications:    Current Outpatient Medications:     amLODIPine (NORVASC) 2.5 MG tablet, Take 1 tablet by mouth Daily., Disp: 90 tablet, Rfl: 1    atorvastatin (LIPITOR) 20 MG tablet, TAKE 1 TABLET BY MOUTH DAILY, Disp: 90 tablet, Rfl: 1    buPROPion XL (WELLBUTRIN XL) 150 MG 24 hr tablet, Take 3 tablets by mouth Every Morning., Disp: , Rfl:     buPROPion XL (WELLBUTRIN XL) 300 MG 24 hr tablet, , Disp: , Rfl:     cetirizine (zyrTEC) 10 MG tablet, Take 1 tablet by mouth Daily for 30 days., Disp: 30 tablet, Rfl: 0    clonazePAM (KlonoPIN) 0.5 MG tablet, Take 1 tablet by mouth 2 (Two) Times a Day As Needed for Anxiety. (Patient not taking: Reported on 8/5/2024), Disp: 30 tablet, Rfl: 2    clonazePAM (KlonoPIN) 1 MG tablet, , Disp: , Rfl:     escitalopram (LEXAPRO) 10 MG tablet, , Disp: , Rfl:     fluticasone (FLONASE) 50 MCG/ACT nasal spray, 2 sprays into the nostril(s) as directed by provider Daily., Disp: 16 g, Rfl: 3    omega-3 acid ethyl esters (LOVAZA) 1 g capsule, TAKE 2 CAPSULES BY MOUTH TWICE A DAY, Disp: 120 capsule, Rfl: 3    ondansetron (Zofran) 4 MG tablet, Take 1 tablet by mouth Every 8 (Eight) Hours As Needed for Nausea or Vomiting., Disp: 20 tablet, Rfl: 0    propranolol LA (INDERAL LA) 60 MG 24 hr capsule, TAKE 1 CAPSULE BY MOUTH DAILY, Disp: 30 capsule, Rfl: 4    saccharomyces boulardii (Florastor) 250 MG capsule, Take 1 capsule by mouth 2 (Two) Times a Day., Disp: 30 capsule, Rfl: 3    traZODone (DESYREL) 50 MG tablet, , Disp: , Rfl:     valsartan (DIOVAN) 320 MG tablet,  TAKE ONE TABLET BY MOUTH DAILY, Disp: 90 tablet, Rfl: 3      Objective   Vital Signs   /82   Pulse 54   Temp 97.1 °F (36.2 °C)   Resp 20   Wt 80.3 kg (177 lb)   BMI 31.35 kg/m²     Physical Exam:   General: awake, alert, NAD   Eyes: no scleral icterus  ENT: no thrush  Cardiovascular: bradycardic, trace right foot edema  Respiratory: normal work of breathing on RA  GI: Abdomen is soft  :  no [place] catheter  Skin: No erythema on the lower extremities  Neurological: Alert and oriented x 3  Psychiatric: Normal mood and affect     Labs:     Lab Results   Component Value Date    WBC 4.07 07/31/2024    HGB 12.4 07/31/2024    HCT 37.6 07/31/2024    MCV 91.0 07/31/2024     07/31/2024     Lab Results   Component Value Date    GLUCOSE 102 (H) 07/31/2024    CALCIUM 8.7 07/31/2024     07/31/2024    K 3.5 07/31/2024    CO2 26.1 07/31/2024     07/31/2024    BUN 6 (L) 07/31/2024    CREATININE 0.67 07/31/2024    EGFRRESULT 74.9 03/19/2024    EGFR 99.0 07/31/2024    BCR 9.0 07/31/2024    ANIONGAP 10.9 07/31/2024     Lab Results   Component Value Date    ALT 18 07/27/2024     Lab Results   Component Value Date    HGBA1C 5.10 07/28/2024       Microbiology:  7/27 BCx: Negative  7/28 MRSA nares screen: Negative    Radiology:  7/27 right lower extremity Doppler negative for DVT but showed enlarged lymph nodes in the groin    ASSESSMENT/PLAN:  Right lower extremity cellulitis - 2nd episode  History of MRSA, infection  Obesity BMI 31    At this point, I do not recommend chronic suppressive oral penicillin since she has only had 2 episodes.  I recommended that she keep a close eye on her legs to make sure she does not develop any erythema or heat.  I recommended that she continue to moisturize her legs and feet to prevent dry, cracked skin which often serves as a portal of entry for bacteria.  She is already using an antibacterial soap.    Thankfully she does not have any significant underlying risk factors  such as diabetes, onychomycosis, or lymphedema.  While she technically qualifies as obese with a BMI of 31, I do not think her weight is necessarily contributing to this problem.    She is not a MRSA carrier so I see no role for decolonization.    She may follow-up with me as needed.  If she were to have another episode within the next year, I would recommend giving her treatment course of cephalexin 500 mg p.o. every 6 hours x 7 days followed by penicillin 250 mg p.o. twice daily x 1 year for prophylaxis.    She is in agreement w/ this plan.

## 2024-09-06 ENCOUNTER — HOSPITAL ENCOUNTER (EMERGENCY)
Facility: HOSPITAL | Age: 64
Discharge: HOME | End: 2024-09-06
Attending: EMERGENCY MEDICINE
Payer: COMMERCIAL

## 2024-09-06 ENCOUNTER — APPOINTMENT (OUTPATIENT)
Dept: GENERAL RADIOLOGY | Facility: HOSPITAL | Age: 64
End: 2024-09-06
Payer: COMMERCIAL

## 2024-09-06 ENCOUNTER — TELEPHONE (OUTPATIENT)
Dept: INTERNAL MEDICINE | Facility: CLINIC | Age: 64
End: 2024-09-06

## 2024-09-06 VITALS
OXYGEN SATURATION: 94 % | HEART RATE: 83 BPM | DIASTOLIC BLOOD PRESSURE: 84 MMHG | TEMPERATURE: 101.8 F | RESPIRATION RATE: 16 BRPM | SYSTOLIC BLOOD PRESSURE: 150 MMHG

## 2024-09-06 DIAGNOSIS — L03.115 CELLULITIS OF RIGHT LOWER EXTREMITY: Primary | ICD-10-CM

## 2024-09-06 LAB
ALBUMIN SERPL-MCNC: 4.4 G/DL (ref 3.5–5.2)
ALBUMIN/GLOB SERPL: 1.6 G/DL
ALP SERPL-CCNC: 64 U/L (ref 39–117)
ALT SERPL W P-5'-P-CCNC: 32 U/L (ref 1–33)
ANION GAP SERPL CALCULATED.3IONS-SCNC: 11.4 MMOL/L (ref 5–15)
AST SERPL-CCNC: 26 U/L (ref 1–32)
BASOPHILS # BLD AUTO: 0.02 10*3/MM3 (ref 0–0.2)
BASOPHILS NFR BLD AUTO: 0.2 % (ref 0–1.5)
BILIRUB SERPL-MCNC: 0.7 MG/DL (ref 0–1.2)
BUN SERPL-MCNC: 12 MG/DL (ref 8–23)
BUN/CREAT SERPL: 16.9 (ref 7–25)
CALCIUM SPEC-SCNC: 8.9 MG/DL (ref 8.6–10.5)
CHLORIDE SERPL-SCNC: 104 MMOL/L (ref 98–107)
CO2 SERPL-SCNC: 24.6 MMOL/L (ref 22–29)
CREAT SERPL-MCNC: 0.71 MG/DL (ref 0.57–1)
D-LACTATE SERPL-SCNC: 2.1 MMOL/L (ref 0.5–2)
DEPRECATED RDW RBC AUTO: 41.8 FL (ref 37–54)
EGFRCR SERPLBLD CKD-EPI 2021: 96.3 ML/MIN/1.73
EOSINOPHIL # BLD AUTO: 0.07 10*3/MM3 (ref 0–0.4)
EOSINOPHIL NFR BLD AUTO: 0.7 % (ref 0.3–6.2)
ERYTHROCYTE [DISTWIDTH] IN BLOOD BY AUTOMATED COUNT: 12.7 % (ref 12.3–15.4)
GLOBULIN UR ELPH-MCNC: 2.8 GM/DL
GLUCOSE SERPL-MCNC: 105 MG/DL (ref 65–99)
HCT VFR BLD AUTO: 40.9 % (ref 34–46.6)
HGB BLD-MCNC: 13.6 G/DL (ref 12–15.9)
HOLD SPECIMEN: NORMAL
HOLD SPECIMEN: NORMAL
IMM GRANULOCYTES # BLD AUTO: 0.03 10*3/MM3 (ref 0–0.05)
IMM GRANULOCYTES NFR BLD AUTO: 0.3 % (ref 0–0.5)
LYMPHOCYTES # BLD AUTO: 0.66 10*3/MM3 (ref 0.7–3.1)
LYMPHOCYTES NFR BLD AUTO: 6.3 % (ref 19.6–45.3)
MCH RBC QN AUTO: 30.3 PG (ref 26.6–33)
MCHC RBC AUTO-ENTMCNC: 33.3 G/DL (ref 31.5–35.7)
MCV RBC AUTO: 91.1 FL (ref 79–97)
MONOCYTES # BLD AUTO: 0.43 10*3/MM3 (ref 0.1–0.9)
MONOCYTES NFR BLD AUTO: 4.1 % (ref 5–12)
NEUTROPHILS NFR BLD AUTO: 88.4 % (ref 42.7–76)
NEUTROPHILS NFR BLD AUTO: 9.31 10*3/MM3 (ref 1.7–7)
NRBC BLD AUTO-RTO: 0 /100 WBC (ref 0–0.2)
PLATELET # BLD AUTO: 201 10*3/MM3 (ref 140–450)
PMV BLD AUTO: 9.8 FL (ref 6–12)
POTASSIUM SERPL-SCNC: 4.1 MMOL/L (ref 3.5–5.2)
PROT SERPL-MCNC: 7.2 G/DL (ref 6–8.5)
RBC # BLD AUTO: 4.49 10*6/MM3 (ref 3.77–5.28)
SODIUM SERPL-SCNC: 140 MMOL/L (ref 136–145)
WBC NRBC COR # BLD AUTO: 10.52 10*3/MM3 (ref 3.4–10.8)
WHOLE BLOOD HOLD COAG: NORMAL
WHOLE BLOOD HOLD SPECIMEN: NORMAL

## 2024-09-06 PROCEDURE — 83605 ASSAY OF LACTIC ACID: CPT | Performed by: PHYSICIAN ASSISTANT

## 2024-09-06 PROCEDURE — 87040 BLOOD CULTURE FOR BACTERIA: CPT | Performed by: PHYSICIAN ASSISTANT

## 2024-09-06 PROCEDURE — 96365 THER/PROPH/DIAG IV INF INIT: CPT

## 2024-09-06 PROCEDURE — 85025 COMPLETE CBC W/AUTO DIFF WBC: CPT | Performed by: EMERGENCY MEDICINE

## 2024-09-06 PROCEDURE — 99283 EMERGENCY DEPT VISIT LOW MDM: CPT

## 2024-09-06 PROCEDURE — 80053 COMPREHEN METABOLIC PANEL: CPT | Performed by: EMERGENCY MEDICINE

## 2024-09-06 PROCEDURE — 36415 COLL VENOUS BLD VENIPUNCTURE: CPT

## 2024-09-06 PROCEDURE — 71045 X-RAY EXAM CHEST 1 VIEW: CPT

## 2024-09-06 PROCEDURE — 25010000002 CEFTRIAXONE PER 250 MG: Performed by: PHYSICIAN ASSISTANT

## 2024-09-06 RX ORDER — CEPHALEXIN 500 MG/1
500 CAPSULE ORAL 4 TIMES DAILY
Qty: 40 CAPSULE | Refills: 0 | Status: SHIPPED | OUTPATIENT
Start: 2024-09-06

## 2024-09-06 RX ORDER — ACETAMINOPHEN 500 MG
1000 TABLET ORAL ONCE
Status: COMPLETED | OUTPATIENT
Start: 2024-09-06 | End: 2024-09-06

## 2024-09-06 RX ADMIN — ACETAMINOPHEN 1000 MG: 500 TABLET ORAL at 14:42

## 2024-09-06 RX ADMIN — CEFTRIAXONE 2000 MG: 2 INJECTION, POWDER, FOR SOLUTION INTRAMUSCULAR; INTRAVENOUS at 14:44

## 2024-09-06 NOTE — ED PROVIDER NOTES
EMERGENCY DEPARTMENT ENCOUNTER    Room Number:  03/03  Date of encounter:  9/6/2024  PCP: Tiffanie Woodard MD  Historian: Patient  Chronic or social conditions impacting care (social determinants of health): None    HPI:  Chief Complaint: Right leg redness, fever  A complete HPI/ROS/PMH/PSH/SH/FH are unobtainable due to: Nothing    Context: Brandy El is a 64 y.o. female with a history of MRSA infection, recent COVID, who presents to the ED c/o acute right leg redness, pain, fever.  Patient has had 2 previous episodes of right lower extremity cellulitis.  The last 1 in July of this year requiring hospitalization.  She was hospitalized at that time and evaluated by infectious disease.    In addition patient was diagnosed with COVID on Monday.  She states she is mildly short of breath.    Denies any history of diabetes, lymphedema.    Review of prior external notes (non-ED):   Reviewed admission from 7/27/2024.  Patient admitted for sepsis, right lower extremity cellulitis.    Also reviewed infectious disease office visit after her admission.  It was noted that if the patient should have a reoccurrence he would like the patient treated with penicillin for 1 year.    Review of prior external test results outside of this encounter:  I reviewed a CMP from 7/31/2024.  Creatinine 0.67, potassium 3.5    PAST MEDICAL HISTORY  Active Ambulatory Problems     Diagnosis Date Noted    Health care maintenance 06/14/2016    Hyperlipidemia 06/14/2016    History of MRSA infection 06/27/2017    Dyspepsia 10/17/2018    Change in bowel habits 10/17/2018    FH: esophageal cancer 10/17/2018    Anxiety 09/16/2022    Cellulitis of right lower extremity 06/22/2023    Sepsis 07/27/2024     Resolved Ambulatory Problems     Diagnosis Date Noted    No Resolved Ambulatory Problems     Past Medical History:   Diagnosis Date    ASCUS of cervix with negative high risk HPV 09/2001    ASCUS of cervix with negative high risk HPV 09/2005    Colon polyp  2013    Depression     Essential hematuria     Gait disturbance     Hypertension 2020    Irritable bowel syndrome many years ago    Postmenopausal     Vaginal delivery          PAST SURGICAL HISTORY  Past Surgical History:   Procedure Laterality Date    ANKLE SURGERY      BACK SURGERY      COLONOSCOPY  10/22/2013    tics, IH, HP    COLONOSCOPY N/A 12/03/2018    Procedure: COLONOSCOPY TO CECUM AND INTO TERMINAL ILEUM WITH COLD BIOPSIES AND COLD BIOPSY POLYPECTOMY;  Surgeon: Syd Emerson MD;  Location: [facility] ENDOSCOPY;  Service: Gastroenterology    ENDOSCOPY N/A 12/03/2018    Procedure: ESOPHAGOGASTRODUODENOSCOPY WITH COLD BIOPSIES;  Surgeon: Syd Emerson MD;  Location: [facility] ENDOSCOPY;  Service: Gastroenterology    LAPAROSCOPIC TUBAL LIGATION Bilateral     TEAR DUCT SURGERY  06/2017    TUBAL ABDOMINAL LIGATION           FAMILY HISTORY  Family History   Problem Relation Age of Onset    COPD Mother     Heart disease Father     Heart disease Brother     Cancer Brother     COPD Brother     Breast cancer Sister     No Known Problems Daughter     No Known Problems Son     No Known Problems Maternal Grandmother     No Known Problems Paternal Grandmother     No Known Problems Maternal Aunt     No Known Problems Paternal Aunt     BRCA 1/2 Neg Hx     Colon cancer Neg Hx     Endometrial cancer Neg Hx     Ovarian cancer Neg Hx          SOCIAL HISTORY  Social History     Socioeconomic History    Marital status:    Tobacco Use    Smoking status: Never    Smokeless tobacco: Never   Vaping Use    Vaping status: Never Used   Substance and Sexual Activity    Alcohol use: Not Currently     Alcohol/week: 2.0 standard drinks of alcohol     Types: 2 Shots of liquor per week     Comment: 2 x weekly    Drug use: No    Sexual activity: Not Currently     Partners: Male     Birth control/protection: Post-menopausal         ALLERGIES  Phenergan [promethazine hcl]        REVIEW OF SYSTEMS  All systems reviewed and negative except for  those discussed in HPI.       PHYSICAL EXAM    I have reviewed the triage vital signs and nursing notes.    ED Triage Vitals   Temp Heart Rate Resp BP SpO2   09/06/24 1334 09/06/24 1334 09/06/24 1334 09/06/24 1335 09/06/24 1334   (!) 101.8 °F (38.8 °C) 87 16 156/83 94 %      Temp src Heart Rate Source Patient Position BP Location FiO2 (%)   09/06/24 1334 09/06/24 1334 -- -- --   Tympanic Monitor          Physical Exam  GENERAL:Alert, oriented, well-appearing, not distressed  HENT: head atraumatic, no nuchal rigidity  EYES: no scleral icterus, EOMI  CV: regular rhythm, regular rate, no murmur  RESPIRATORY: normal effort, CTA  ABDOMEN: soft, nontender  MUSCULOSKELETAL: Mild erythema to the distal third of the right medial lower leg.  No lymphangitis.  No circumferential erythema.  No abscess.  Pulses intact distally.  NEURO: alert, moves all extremities, follows commands  SKIN: warm, dry        LAB RESULTS  Recent Results (from the past 24 hour(s))   Comprehensive Metabolic Panel    Collection Time: 09/06/24  2:23 PM    Specimen: Blood   Result Value Ref Range    Glucose 105 (H) 65 - 99 mg/dL    BUN 12 8 - 23 mg/dL    Creatinine 0.71 0.57 - 1.00 mg/dL    Sodium 140 136 - 145 mmol/L    Potassium 4.1 3.5 - 5.2 mmol/L    Chloride 104 98 - 107 mmol/L    CO2 24.6 22.0 - 29.0 mmol/L    Calcium 8.9 8.6 - 10.5 mg/dL    Total Protein 7.2 6.0 - 8.5 g/dL    Albumin 4.4 3.5 - 5.2 g/dL    ALT (SGPT) 32 1 - 33 U/L    AST (SGOT) 26 1 - 32 U/L    Alkaline Phosphatase 64 39 - 117 U/L    Total Bilirubin 0.7 0.0 - 1.2 mg/dL    Globulin 2.8 gm/dL    A/G Ratio 1.6 g/dL    BUN/Creatinine Ratio 16.9 7.0 - 25.0    Anion Gap 11.4 5.0 - 15.0 mmol/L    eGFR 96.3 >60.0 mL/min/1.73   CBC Auto Differential    Collection Time: 09/06/24  2:23 PM    Specimen: Blood   Result Value Ref Range    WBC 10.52 3.40 - 10.80 10*3/mm3    RBC 4.49 3.77 - 5.28 10*6/mm3    Hemoglobin 13.6 12.0 - 15.9 g/dL    Hematocrit 40.9 34.0 - 46.6 %    MCV 91.1 79.0 - 97.0 fL     MCH 30.3 26.6 - 33.0 pg    MCHC 33.3 31.5 - 35.7 g/dL    RDW 12.7 12.3 - 15.4 %    RDW-SD 41.8 37.0 - 54.0 fl    MPV 9.8 6.0 - 12.0 fL    Platelets 201 140 - 450 10*3/mm3    Neutrophil % 88.4 (H) 42.7 - 76.0 %    Lymphocyte % 6.3 (L) 19.6 - 45.3 %    Monocyte % 4.1 (L) 5.0 - 12.0 %    Eosinophil % 0.7 0.3 - 6.2 %    Basophil % 0.2 0.0 - 1.5 %    Immature Grans % 0.3 0.0 - 0.5 %    Neutrophils, Absolute 9.31 (H) 1.70 - 7.00 10*3/mm3    Lymphocytes, Absolute 0.66 (L) 0.70 - 3.10 10*3/mm3    Monocytes, Absolute 0.43 0.10 - 0.90 10*3/mm3    Eosinophils, Absolute 0.07 0.00 - 0.40 10*3/mm3    Basophils, Absolute 0.02 0.00 - 0.20 10*3/mm3    Immature Grans, Absolute 0.03 0.00 - 0.05 10*3/mm3    nRBC 0.0 0.0 - 0.2 /100 WBC   Green Top (Gel)    Collection Time: 09/06/24  2:23 PM   Result Value Ref Range    Extra Tube Hold for add-ons.    Lavender Top    Collection Time: 09/06/24  2:23 PM   Result Value Ref Range    Extra Tube hold for add-on    Gold Top - SST    Collection Time: 09/06/24  2:23 PM   Result Value Ref Range    Extra Tube Hold for add-ons.    Light Blue Top    Collection Time: 09/06/24  2:23 PM   Result Value Ref Range    Extra Tube Hold for add-ons.    Lactic Acid, Plasma    Collection Time: 09/06/24  2:23 PM    Specimen: Blood   Result Value Ref Range    Lactate 2.1 (C) 0.5 - 2.0 mmol/L       Ordered the above labs and independently reviewed the results.        RADIOLOGY  XR Chest 1 View    Result Date: 9/6/2024  XR CHEST 1 VW-  INDICATION: COVID-positive  COMPARISON: 4/5/2023      No focal consolidation. No pleural effusion or pneumothorax.  Normal size cardiomediastinal silhouette.  No focal osseous abnormality.   This report was finalized on 9/6/2024 2:49 PM by Dr. Bear Godwin M.D on Workstation: [id number]       I ordered the above noted radiological studies. Reviewed by me and discussed with radiologist.  See dictation for official radiology interpretation.      MEDICATIONS GIVEN IN  ER    Medications   acetaminophen (TYLENOL) tablet 1,000 mg (1,000 mg Oral Given 9/6/24 1442)   cefTRIAXone (ROCEPHIN) 2,000 mg in sodium chloride 0.9 % 100 mL MBP (0 mg Intravenous Stopped 9/6/24 1551)         ADDITIONAL ORDERS CONSIDERED BUT NOT ORDERED:  Admission was considered but after careful review of the patient's presentation, physical examination, diagnostic results, and response to treatment the patient may be safely discharged with outpatient follow-up.       PROGRESS, DATA ANALYSIS, CONSULTS, AND MEDICAL DECISION MAKING    All labs have been independently interpreted by myself.  All radiology studies have been independently interpreted by myself and discussed with radiologist dictating the report.   EKG's independently interpreted by myself.  Discussion below represents my analysis of pertinent findings related to patient's condition, differential diagnosis, treatment plan and final disposition.    I have discussed case with Dr. Royal, emergency room physician.  He has performed his own bedside examination and agrees with treatment plan.    ED Course as of 09/06/24 1553   Fri Sep 06, 2024   1412 Patient presents with a 2-day history of recurrent right lower leg redness.  Patient has had 2 previous episodes of cellulitis in his leg.  She is febrile however also was diagnosed with COVID this week.  The cellulitis is not severe at this time.  Will give a dose of Rocephin and check basic labs. [EE]   1437 Chest x-ray independently interpreted myself shows no acute infiltrate. [EE]   1503 WBC: 10.52 [EE]   1503 Hemoglobin: 13.6 [EE]   1545 Patient has reassuring lab work here.  I suspect her fever is likely a combination of her COVID and cellulitis.  Her cellulitis is again not very impressive at this time.  She does have previous admissions.  We will discharge her home on antibiotics.  She understands importance of following up with her primary care doctor or infectious disease for prophylactic  treatment. [EE]      ED Course User Index  [EE] Damien Hernandez PA       AS OF 15:53 EDT VITALS:    BP - 150/84  HR - 83  TEMP - (!) 101.8 °F (38.8 °C) (Tympanic)  O2 SATS - 94%        DIAGNOSIS  Final diagnoses:   Cellulitis of right lower extremity         DISPOSITION  Discharged    Admission was considered but after careful review of the patient's presentation, physical examination, diagnostic results, and response to treatment the patient may be safely discharged with outpatient follow-up.         Dictated utilizing Dragon dictation     Damien Hernandez PA  09/06/24 1553

## 2024-09-06 NOTE — ED NOTES
"Pt has been seen for a right lower leg cellulitis.  She was treated c abx.  She feels it is \"coming back\"  "

## 2024-09-06 NOTE — TELEPHONE ENCOUNTER
Caller: Brandy El    Relationship: Self    Best call back number: [phone]     Who are you requesting to speak with (clinical staff, provider,  specific staff member): CLINICAL STAFF     What was the call regarding: STATES THINKS CELLULITIS IS COMING BACK ON RIGHT LEG ON SHIN BONE , RED, WARM TO TOUCH NOTICED IT ABOUT 1-1/2 AGO   PATIENT DOES HAVE COVID AT THIS TIME WANTING TO KNOW WHAT HER NEXT STEPS SHOULD BE

## 2024-09-11 LAB
BACTERIA SPEC AEROBE CULT: NORMAL
BACTERIA SPEC AEROBE CULT: NORMAL

## 2024-09-13 RX ORDER — VALSARTAN 320 MG/1
320 TABLET ORAL DAILY
Qty: 90 TABLET | Refills: 3 | Status: SHIPPED | OUTPATIENT
Start: 2024-09-13

## 2024-09-13 RX ORDER — AMLODIPINE BESYLATE 2.5 MG/1
2.5 TABLET ORAL DAILY
Qty: 90 TABLET | Refills: 1 | Status: SHIPPED | OUTPATIENT
Start: 2024-09-13

## 2024-09-20 RX ORDER — PROPRANOLOL HCL 60 MG
60 CAPSULE, EXTENDED RELEASE 24HR ORAL DAILY
Qty: 30 CAPSULE | Refills: 4 | Status: SHIPPED | OUTPATIENT
Start: 2024-09-20

## 2024-11-18 RX ORDER — OMEGA-3-ACID ETHYL ESTERS 1 G/1
2 CAPSULE, LIQUID FILLED ORAL 2 TIMES DAILY
Qty: 120 CAPSULE | Refills: 3 | Status: SHIPPED | OUTPATIENT
Start: 2024-11-18

## 2024-11-21 ENCOUNTER — HOSPITAL ENCOUNTER (EMERGENCY)
Facility: HOSPITAL | Age: 64
Discharge: HOME | End: 2024-11-21
Attending: EMERGENCY MEDICINE
Payer: COMMERCIAL

## 2024-11-21 VITALS
TEMPERATURE: 100 F | HEIGHT: 62 IN | BODY MASS INDEX: 31.28 KG/M2 | DIASTOLIC BLOOD PRESSURE: 83 MMHG | HEART RATE: 66 BPM | WEIGHT: 170 LBS | RESPIRATION RATE: 18 BRPM | SYSTOLIC BLOOD PRESSURE: 144 MMHG | OXYGEN SATURATION: 94 %

## 2024-11-21 DIAGNOSIS — L03.115 CELLULITIS OF RIGHT LOWER LEG: Primary | ICD-10-CM

## 2024-11-21 LAB
ANION GAP SERPL CALCULATED.3IONS-SCNC: 10.1 MMOL/L (ref 5–15)
BASOPHILS # BLD AUTO: 0.03 10*3/MM3 (ref 0–0.2)
BASOPHILS NFR BLD AUTO: 0.5 % (ref 0–1.5)
BUN SERPL-MCNC: 16 MG/DL (ref 8–23)
BUN/CREAT SERPL: 21.6 (ref 7–25)
CALCIUM SPEC-SCNC: 8.8 MG/DL (ref 8.6–10.5)
CHLORIDE SERPL-SCNC: 104 MMOL/L (ref 98–107)
CO2 SERPL-SCNC: 23.9 MMOL/L (ref 22–29)
CREAT SERPL-MCNC: 0.74 MG/DL (ref 0.57–1)
CRP SERPL-MCNC: 4.45 MG/DL (ref 0–0.5)
D-LACTATE SERPL-SCNC: 1.2 MMOL/L (ref 0.5–2)
DEPRECATED RDW RBC AUTO: 43 FL (ref 37–54)
EGFRCR SERPLBLD CKD-EPI 2021: 91.6 ML/MIN/1.73
EOSINOPHIL # BLD AUTO: 0.05 10*3/MM3 (ref 0–0.4)
EOSINOPHIL NFR BLD AUTO: 0.8 % (ref 0.3–6.2)
ERYTHROCYTE [DISTWIDTH] IN BLOOD BY AUTOMATED COUNT: 13 % (ref 12.3–15.4)
GLUCOSE SERPL-MCNC: 104 MG/DL (ref 65–99)
HCT VFR BLD AUTO: 37.6 % (ref 34–46.6)
HGB BLD-MCNC: 12.6 G/DL (ref 12–15.9)
IMM GRANULOCYTES # BLD AUTO: 0.02 10*3/MM3 (ref 0–0.05)
IMM GRANULOCYTES NFR BLD AUTO: 0.3 % (ref 0–0.5)
LYMPHOCYTES # BLD AUTO: 0.49 10*3/MM3 (ref 0.7–3.1)
LYMPHOCYTES NFR BLD AUTO: 8.1 % (ref 19.6–45.3)
MCH RBC QN AUTO: 30.7 PG (ref 26.6–33)
MCHC RBC AUTO-ENTMCNC: 33.5 G/DL (ref 31.5–35.7)
MCV RBC AUTO: 91.7 FL (ref 79–97)
MONOCYTES # BLD AUTO: 0.31 10*3/MM3 (ref 0.1–0.9)
MONOCYTES NFR BLD AUTO: 5.1 % (ref 5–12)
NEUTROPHILS NFR BLD AUTO: 5.17 10*3/MM3 (ref 1.7–7)
NEUTROPHILS NFR BLD AUTO: 85.2 % (ref 42.7–76)
NRBC BLD AUTO-RTO: 0 /100 WBC (ref 0–0.2)
PLATELET # BLD AUTO: 162 10*3/MM3 (ref 140–450)
PMV BLD AUTO: 9.5 FL (ref 6–12)
POTASSIUM SERPL-SCNC: 3.9 MMOL/L (ref 3.5–5.2)
PROCALCITONIN SERPL-MCNC: 0.05 NG/ML (ref 0–0.25)
RBC # BLD AUTO: 4.1 10*6/MM3 (ref 3.77–5.28)
SODIUM SERPL-SCNC: 138 MMOL/L (ref 136–145)
WBC NRBC COR # BLD AUTO: 6.07 10*3/MM3 (ref 3.4–10.8)

## 2024-11-21 PROCEDURE — 85025 COMPLETE CBC W/AUTO DIFF WBC: CPT | Performed by: EMERGENCY MEDICINE

## 2024-11-21 PROCEDURE — 84145 PROCALCITONIN (PCT): CPT | Performed by: EMERGENCY MEDICINE

## 2024-11-21 PROCEDURE — 86140 C-REACTIVE PROTEIN: CPT | Performed by: EMERGENCY MEDICINE

## 2024-11-21 PROCEDURE — 83605 ASSAY OF LACTIC ACID: CPT | Performed by: EMERGENCY MEDICINE

## 2024-11-21 PROCEDURE — 80048 BASIC METABOLIC PNL TOTAL CA: CPT | Performed by: EMERGENCY MEDICINE

## 2024-11-21 PROCEDURE — 96365 THER/PROPH/DIAG IV INF INIT: CPT

## 2024-11-21 PROCEDURE — 25010000002 CLINDAMYCIN 600 MG/50ML SOLUTION: Performed by: EMERGENCY MEDICINE

## 2024-11-21 PROCEDURE — 25810000003 SODIUM CHLORIDE 0.9 % SOLUTION: Performed by: EMERGENCY MEDICINE

## 2024-11-21 PROCEDURE — 99283 EMERGENCY DEPT VISIT LOW MDM: CPT

## 2024-11-21 RX ORDER — SODIUM CHLORIDE 0.9 % (FLUSH) 0.9 %
10 SYRINGE (ML) INJECTION AS NEEDED
Status: DISCONTINUED | OUTPATIENT
Start: 2024-11-21 | End: 2024-11-21 | Stop reason: HOSPADM

## 2024-11-21 RX ORDER — CLINDAMYCIN PHOSPHATE 600 MG/50ML
600 INJECTION, SOLUTION INTRAVENOUS ONCE
Status: COMPLETED | OUTPATIENT
Start: 2024-11-21 | End: 2024-11-21

## 2024-11-21 RX ORDER — CLINDAMYCIN HYDROCHLORIDE 300 MG/1
300 CAPSULE ORAL 3 TIMES DAILY
Qty: 21 CAPSULE | Refills: 0 | Status: SHIPPED | OUTPATIENT
Start: 2024-11-21

## 2024-11-21 RX ADMIN — SODIUM CHLORIDE 500 ML: 9 INJECTION, SOLUTION INTRAVENOUS at 13:37

## 2024-11-21 RX ADMIN — CLINDAMYCIN PHOSPHATE 600 MG: 600 INJECTION, SOLUTION INTRAVENOUS at 15:47

## 2024-11-21 NOTE — ED PROVIDER NOTES
EMERGENCY DEPARTMENT ENCOUNTER    Room Number:  30/30  PCP: Tiffanie Woodard MD  Independent Historians: Patient    HPI:  Chief Complaint: had concerns including Wound Check (Redness and swelling to right lower leg. ).      A complete HPI/ROS/PMH/PSH/SH/FH are unobtainable due to: None    Chronic or social conditions impacting patient care (Social Determinants of Health): None      Context: Brandy El is a 64 y.o. female with a medical history of hyperlipidemia and dyspepsia with recurrent cellulitis in her lower extremities requiring hospitalization for previous times who presents to the ED c/o acute right lower extremity redness and swelling with fever this morning.  Patient denies any calf pain, injury, nausea, vomiting.        Review of prior external notes (non-ED) -and- Review of prior external test results outside of this encounter: Patient last seen for cellulitis in September of right lower extremity.  Patient given Rocephin in the ER and discharged on cephalexin.    Patient seen by Dr. Murry with ID in August with resolution of the cellulitis in her right lower leg after hospitalization in July.  Patient was treated during her hospitalization with vancomycin and cefazolin.  Being discharged on cephalexin then as well..    Prescription drug monitoring program review:     N/A    PAST MEDICAL HISTORY  Active Ambulatory Problems     Diagnosis Date Noted    Health care maintenance 06/14/2016    Hyperlipidemia 06/14/2016    History of MRSA infection 06/27/2017    Dyspepsia 10/17/2018    Change in bowel habits 10/17/2018    FH: esophageal cancer 10/17/2018    Anxiety 09/16/2022    Cellulitis of right lower extremity 06/22/2023    Sepsis 07/27/2024     Resolved Ambulatory Problems     Diagnosis Date Noted    No Resolved Ambulatory Problems     Past Medical History:   Diagnosis Date    ASCUS of cervix with negative high risk HPV 09/2001    ASCUS of cervix with negative high risk HPV 09/2005    Colon polyp  2013    Depression     Essential hematuria     Gait disturbance     Hypertension 2020    Irritable bowel syndrome many years ago    Postmenopausal     Vaginal delivery          PAST SURGICAL HISTORY  Past Surgical History:   Procedure Laterality Date    ANKLE SURGERY      BACK SURGERY      COLONOSCOPY  10/22/2013    tics, IH, HP    COLONOSCOPY N/A 12/03/2018    Procedure: COLONOSCOPY TO CECUM AND INTO TERMINAL ILEUM WITH COLD BIOPSIES AND COLD BIOPSY POLYPECTOMY;  Surgeon: Syd Emerson MD;  Location: [facility] ENDOSCOPY;  Service: Gastroenterology    ENDOSCOPY N/A 12/03/2018    Procedure: ESOPHAGOGASTRODUODENOSCOPY WITH COLD BIOPSIES;  Surgeon: Syd Emerson MD;  Location: [facility] ENDOSCOPY;  Service: Gastroenterology    LAPAROSCOPIC TUBAL LIGATION Bilateral     TEAR DUCT SURGERY  06/2017    TUBAL ABDOMINAL LIGATION           FAMILY HISTORY  Family History   Problem Relation Age of Onset    COPD Mother     Heart disease Father     Heart disease Brother     Cancer Brother     COPD Brother     Breast cancer Sister     No Known Problems Daughter     No Known Problems Son     No Known Problems Maternal Grandmother     No Known Problems Paternal Grandmother     No Known Problems Maternal Aunt     No Known Problems Paternal Aunt     BRCA 1/2 Neg Hx     Colon cancer Neg Hx     Endometrial cancer Neg Hx     Ovarian cancer Neg Hx          SOCIAL HISTORY  Social History     Socioeconomic History    Marital status:    Tobacco Use    Smoking status: Never    Smokeless tobacco: Never   Vaping Use    Vaping status: Never Used   Substance and Sexual Activity    Alcohol use: Not Currently     Alcohol/week: 2.0 standard drinks of alcohol     Types: 2 Shots of liquor per week     Comment: 2 x weekly    Drug use: No    Sexual activity: Not Currently     Partners: Male     Birth control/protection: Post-menopausal         ALLERGIES  Phenergan [promethazine hcl]        REVIEW OF SYSTEMS  Review of Systems  Included in HPI  All  systems reviewed and negative except for those discussed in HPI.      PHYSICAL EXAM    I have reviewed the triage vital signs and nursing notes.    ED Triage Vitals   Temp Heart Rate Resp BP SpO2   11/21/24 1213 11/21/24 1213 11/21/24 1213 11/21/24 1215 11/21/24 1213   100 °F (37.8 °C) 86 18 148/77 96 %      Temp src Heart Rate Source Patient Position BP Location FiO2 (%)   11/21/24 1213 -- -- -- --   Tympanic           Physical Exam  GENERAL: pleasant cooperative f, alert, no acute distress  SKIN: Warm, dry, medial right lower leg with area of erythema and warmth but no induration, no open sores/lesion, no weeping or drainage  HENT: Normocephalic, atraumatic  EYES: no scleral icterus  RESPIRATORY: relaxed breathing  MUSCULOSKELETAL: no deformity, nl rom both lower exts, nl gait, 2+ pt and dp pulses ble  NEURO: alert, moves all extremities, follows commands                                                                   LAB RESULTS  Recent Results (from the past 24 hours)   Basic Metabolic Panel    Collection Time: 11/21/24  1:31 PM    Specimen: Blood   Result Value Ref Range    Glucose 104 (H) 65 - 99 mg/dL    BUN 16 8 - 23 mg/dL    Creatinine 0.74 0.57 - 1.00 mg/dL    Sodium 138 136 - 145 mmol/L    Potassium 3.9 3.5 - 5.2 mmol/L    Chloride 104 98 - 107 mmol/L    CO2 23.9 22.0 - 29.0 mmol/L    Calcium 8.8 8.6 - 10.5 mg/dL    BUN/Creatinine Ratio 21.6 7.0 - 25.0    Anion Gap 10.1 5.0 - 15.0 mmol/L    eGFR 91.6 >60.0 mL/min/1.73   Lactic Acid, Plasma    Collection Time: 11/21/24  1:31 PM    Specimen: Blood   Result Value Ref Range    Lactate 1.2 0.5 - 2.0 mmol/L   Procalcitonin    Collection Time: 11/21/24  1:31 PM    Specimen: Blood   Result Value Ref Range    Procalcitonin 0.05 0.00 - 0.25 ng/mL   C-reactive Protein    Collection Time: 11/21/24  1:31 PM    Specimen: Blood   Result Value Ref Range    C-Reactive Protein 4.45 (H) 0.00 - 0.50 mg/dL   CBC Auto Differential    Collection Time: 11/21/24  1:31 PM     Specimen: Blood   Result Value Ref Range    WBC 6.07 3.40 - 10.80 10*3/mm3    RBC 4.10 3.77 - 5.28 10*6/mm3    Hemoglobin 12.6 12.0 - 15.9 g/dL    Hematocrit 37.6 34.0 - 46.6 %    MCV 91.7 79.0 - 97.0 fL    MCH 30.7 26.6 - 33.0 pg    MCHC 33.5 31.5 - 35.7 g/dL    RDW 13.0 12.3 - 15.4 %    RDW-SD 43.0 37.0 - 54.0 fl    MPV 9.5 6.0 - 12.0 fL    Platelets 162 140 - 450 10*3/mm3    Neutrophil % 85.2 (H) 42.7 - 76.0 %    Lymphocyte % 8.1 (L) 19.6 - 45.3 %    Monocyte % 5.1 5.0 - 12.0 %    Eosinophil % 0.8 0.3 - 6.2 %    Basophil % 0.5 0.0 - 1.5 %    Immature Grans % 0.3 0.0 - 0.5 %    Neutrophils, Absolute 5.17 1.70 - 7.00 10*3/mm3    Lymphocytes, Absolute 0.49 (L) 0.70 - 3.10 10*3/mm3    Monocytes, Absolute 0.31 0.10 - 0.90 10*3/mm3    Eosinophils, Absolute 0.05 0.00 - 0.40 10*3/mm3    Basophils, Absolute 0.03 0.00 - 0.20 10*3/mm3    Immature Grans, Absolute 0.02 0.00 - 0.05 10*3/mm3    nRBC 0.0 0.0 - 0.2 /100 WBC         RADIOLOGY  No Radiology Exams Resulted Within Past 24 Hours      MEDICATIONS GIVEN IN ER  Medications   sodium chloride 0.9 % flush 10 mL (has no administration in time range)   clindamycin (CLEOCIN) 600 mg in dextrose 5% 50 mL IVPB (premix) (600 mg Intravenous New Bag 11/21/24 1547)   sodium chloride 0.9 % bolus 500 mL (500 mL Intravenous New Bag 11/21/24 1337)         ORDERS PLACED DURING THIS VISIT:  Orders Placed This Encounter   Procedures    Basic Metabolic Panel    Lactic Acid, Plasma    Procalcitonin    C-reactive Protein    CBC Auto Differential    Insert Peripheral IV    CBC & Differential         OUTPATIENT MEDICATION MANAGEMENT:  Current Facility-Administered Medications Ordered in Epic   Medication Dose Route Frequency Provider Last Rate Last Admin    clindamycin (CLEOCIN) 600 mg in dextrose 5% 50 mL IVPB (premix)  600 mg Intravenous Once Karla Gates  mL/hr at 11/21/24 1547 600 mg at 11/21/24 1547    sodium chloride 0.9 % flush 10 mL  10 mL Intravenous PRN Karla Gates  MD Zackary         Current Outpatient Medications Ordered in Epic   Medication Sig Dispense Refill    amLODIPine (NORVASC) 2.5 MG tablet TAKE 1 TABLET BY MOUTH DAILY 90 tablet 1    atorvastatin (LIPITOR) 20 MG tablet TAKE 1 TABLET BY MOUTH DAILY 90 tablet 1    buPROPion XL (WELLBUTRIN XL) 150 MG 24 hr tablet Take 3 tablets by mouth Every Morning.      buPROPion XL (WELLBUTRIN XL) 300 MG 24 hr tablet       cetirizine (zyrTEC) 10 MG tablet Take 1 tablet by mouth Daily for 30 days. 30 tablet 0    clindamycin (CLEOCIN) 300 MG capsule Take 1 capsule by mouth 3 (Three) Times a Day. 21 capsule 0    clonazePAM (KlonoPIN) 1 MG tablet 1.5 tablets. Takes 1 1/2 tabs as needed.      escitalopram (LEXAPRO) 10 MG tablet Take 2 tablets by mouth Daily. Takes a total of 20mg daily      omega-3 acid ethyl esters (LOVAZA) 1 g capsule TAKE 2 CAPSULES BY MOUTH TWICE A  capsule 3    ondansetron (Zofran) 4 MG tablet Take 1 tablet by mouth Every 8 (Eight) Hours As Needed for Nausea or Vomiting. 20 tablet 0    propranolol LA (INDERAL LA) 60 MG 24 hr capsule TAKE 1 CAPSULE BY MOUTH DAILY 30 capsule 4    traZODone (DESYREL) 50 MG tablet       valsartan (DIOVAN) 320 MG tablet TAKE 1 TABLET BY MOUTH DAILY 90 tablet 3         PROCEDURES  Procedures            PROGRESS, DATA ANALYSIS, CONSULTS, AND MEDICAL DECISION MAKING  All labs have been independently interpreted by me.  All radiology studies have been reviewed by me. All EKG's have been independently viewed and interpreted by me.  Discussion below represents my analysis of pertinent findings related to patient's condition, differential diagnosis, treatment plan and final disposition.    This patient presents with lower extremity erythema, warmth, and swelling concerning for cellulitis. Patient does have sensitivity/pain to light touch around the erythematous area. No streaking nor exam evidence of fluctuance concerning for abscess noted. Low concern for osteomyelitis or DVT. No immune  compromise, bullae, pain out of proportion, or rapid progression concerning for necrotizing fasciitis. Calf is soft and no concern for compartment syndrome.                   AS OF 15:59 EST VITALS:    BP - 144/71  HR - 65  TEMP - 100 °F (37.8 °C) (Tympanic)  O2 SATS - 94%      COMPLEXITY OF CARE  Admission was considered but after careful review of the patient's presentation, physical examination, diagnostic results, and response to treatment the patient may be safely discharged with outpatient follow-up.    DIAGNOSIS  Final diagnoses:   Cellulitis of right lower leg         DISPOSITION  ED Disposition       ED Disposition   Discharge    Condition   Stable    Comment   --                Please note that portions of this document were completed with a voice recognition program.    Note Disclaimer: At [facility], we believe that sharing information builds trust and better relationships. You are receiving this note because you recently visited [facility]. It is possible you will see health information before a provider has talked with you about it. This kind of information can be easy to misunderstand. To help you fully understand what it means for your health, we urge you to discuss this note with your provider.         Karla Gates MD  11/22/24 3054

## 2024-12-30 RX ORDER — ATORVASTATIN CALCIUM 20 MG/1
20 TABLET, FILM COATED ORAL DAILY
Qty: 90 TABLET | Refills: 1 | Status: SHIPPED | OUTPATIENT
Start: 2024-12-30

## 2025-01-16 DIAGNOSIS — Z00.00 HEALTH CARE MAINTENANCE: ICD-10-CM

## 2025-01-16 DIAGNOSIS — E78.5 HYPERLIPIDEMIA, UNSPECIFIED HYPERLIPIDEMIA TYPE: Primary | ICD-10-CM

## 2025-01-25 LAB
ALBUMIN SERPL-MCNC: 4 G/DL (ref 3.5–5.2)
ALBUMIN/GLOB SERPL: 1.7 G/DL
ALP SERPL-CCNC: 65 U/L (ref 39–117)
ALT SERPL-CCNC: 17 U/L (ref 1–33)
APPEARANCE UR: CLEAR
AST SERPL-CCNC: 13 U/L (ref 1–32)
BACTERIA #/AREA URNS HPF: NORMAL /[HPF]
BASOPHILS # BLD AUTO: 0.02 10*3/MM3 (ref 0–0.2)
BASOPHILS NFR BLD AUTO: 0.5 % (ref 0–1.5)
BILIRUB SERPL-MCNC: 0.5 MG/DL (ref 0–1.2)
BILIRUB UR QL STRIP: NEGATIVE
BUN SERPL-MCNC: 21 MG/DL (ref 8–23)
BUN/CREAT SERPL: 28 (ref 7–25)
CALCIUM SERPL-MCNC: 9.1 MG/DL (ref 8.6–10.5)
CASTS URNS QL MICRO: NORMAL /LPF
CHLORIDE SERPL-SCNC: 100 MMOL/L (ref 98–107)
CHOLEST SERPL-MCNC: 200 MG/DL (ref 0–200)
CO2 SERPL-SCNC: 25.7 MMOL/L (ref 22–29)
COLOR UR: YELLOW
CREAT SERPL-MCNC: 0.75 MG/DL (ref 0.57–1)
EGFRCR SERPLBLD CKD-EPI 2021: 90.1 ML/MIN/1.73
EOSINOPHIL # BLD AUTO: 0.15 10*3/MM3 (ref 0–0.4)
EOSINOPHIL NFR BLD AUTO: 3.7 % (ref 0.3–6.2)
EPI CELLS #/AREA URNS HPF: NORMAL /HPF (ref 0–10)
ERYTHROCYTE [DISTWIDTH] IN BLOOD BY AUTOMATED COUNT: 12.6 % (ref 12.3–15.4)
GLOBULIN SER CALC-MCNC: 2.4 GM/DL
GLUCOSE SERPL-MCNC: 113 MG/DL (ref 65–99)
GLUCOSE UR QL STRIP: NEGATIVE
HCT VFR BLD AUTO: 40.4 % (ref 34–46.6)
HDLC SERPL-MCNC: 42 MG/DL (ref 40–60)
HGB BLD-MCNC: 13.5 G/DL (ref 12–15.9)
HGB UR QL STRIP: ABNORMAL
IMM GRANULOCYTES # BLD AUTO: 0.02 10*3/MM3 (ref 0–0.05)
IMM GRANULOCYTES NFR BLD AUTO: 0.5 % (ref 0–0.5)
KETONES UR QL STRIP: NEGATIVE
LDLC SERPL CALC-MCNC: 123 MG/DL (ref 0–100)
LEUKOCYTE ESTERASE UR QL STRIP: NEGATIVE
LYMPHOCYTES # BLD AUTO: 0.89 10*3/MM3 (ref 0.7–3.1)
LYMPHOCYTES NFR BLD AUTO: 22 % (ref 19.6–45.3)
MCH RBC QN AUTO: 30.5 PG (ref 26.6–33)
MCHC RBC AUTO-ENTMCNC: 33.4 G/DL (ref 31.5–35.7)
MCV RBC AUTO: 91.2 FL (ref 79–97)
MICRO URNS: ABNORMAL
MONOCYTES # BLD AUTO: 0.33 10*3/MM3 (ref 0.1–0.9)
MONOCYTES NFR BLD AUTO: 8.1 % (ref 5–12)
NEUTROPHILS # BLD AUTO: 2.64 10*3/MM3 (ref 1.7–7)
NEUTROPHILS NFR BLD AUTO: 65.2 % (ref 42.7–76)
NITRITE UR QL STRIP: NEGATIVE
NRBC BLD AUTO-RTO: 0 /100 WBC (ref 0–0.2)
PH UR STRIP: 5.5 [PH] (ref 5–7.5)
PLATELET # BLD AUTO: 203 10*3/MM3 (ref 140–450)
POTASSIUM SERPL-SCNC: 4.2 MMOL/L (ref 3.5–5.2)
PROT SERPL-MCNC: 6.4 G/DL (ref 6–8.5)
PROT UR QL STRIP: NEGATIVE
RBC # BLD AUTO: 4.43 10*6/MM3 (ref 3.77–5.28)
RBC #/AREA URNS HPF: NORMAL /HPF (ref 0–2)
SODIUM SERPL-SCNC: 144 MMOL/L (ref 136–145)
SP GR UR STRIP: 1.02 (ref 1–1.03)
TRIGL SERPL-MCNC: 197 MG/DL (ref 0–150)
TSH SERPL DL<=0.005 MIU/L-ACNC: 1.49 UIU/ML (ref 0.27–4.2)
URINALYSIS REFLEX: ABNORMAL
UROBILINOGEN UR STRIP-MCNC: 0.2 MG/DL (ref 0.2–1)
VLDLC SERPL CALC-MCNC: 35 MG/DL (ref 5–40)
WBC # BLD AUTO: 4.05 10*3/MM3 (ref 3.4–10.8)
WBC #/AREA URNS HPF: NORMAL /HPF (ref 0–5)

## 2025-01-28 ENCOUNTER — OFFICE VISIT (OUTPATIENT)
Dept: INTERNAL MEDICINE | Facility: CLINIC | Age: 65
End: 2025-01-28
Payer: COMMERCIAL

## 2025-01-28 VITALS
SYSTOLIC BLOOD PRESSURE: 132 MMHG | HEART RATE: 56 BPM | BODY MASS INDEX: 32.02 KG/M2 | HEIGHT: 62 IN | OXYGEN SATURATION: 96 % | DIASTOLIC BLOOD PRESSURE: 76 MMHG | WEIGHT: 174 LBS

## 2025-01-28 DIAGNOSIS — Z23 NEED FOR VACCINATION: ICD-10-CM

## 2025-01-28 DIAGNOSIS — R73.01 IMPAIRED FASTING GLUCOSE: ICD-10-CM

## 2025-01-28 DIAGNOSIS — Z12.12 ENCOUNTER FOR COLORECTAL CANCER SCREENING: ICD-10-CM

## 2025-01-28 DIAGNOSIS — Z12.31 BREAST CANCER SCREENING BY MAMMOGRAM: ICD-10-CM

## 2025-01-28 DIAGNOSIS — Z12.11 ENCOUNTER FOR COLORECTAL CANCER SCREENING: ICD-10-CM

## 2025-01-28 DIAGNOSIS — E78.1 HYPERTRIGLYCERIDEMIA: ICD-10-CM

## 2025-01-28 DIAGNOSIS — F32.A DEPRESSION, UNSPECIFIED DEPRESSION TYPE: ICD-10-CM

## 2025-01-28 DIAGNOSIS — I10 HYPERTENSION, UNSPECIFIED TYPE: ICD-10-CM

## 2025-01-28 DIAGNOSIS — Z00.00 HEALTHCARE MAINTENANCE: Primary | ICD-10-CM

## 2025-01-28 DIAGNOSIS — Z12.4 CERVICAL CANCER SCREENING: ICD-10-CM

## 2025-01-28 PROCEDURE — 90656 IIV3 VACC NO PRSV 0.5 ML IM: CPT | Performed by: INTERNAL MEDICINE

## 2025-01-28 PROCEDURE — 99396 PREV VISIT EST AGE 40-64: CPT | Performed by: INTERNAL MEDICINE

## 2025-01-28 PROCEDURE — 99214 OFFICE O/P EST MOD 30 MIN: CPT | Performed by: INTERNAL MEDICINE

## 2025-01-28 PROCEDURE — 90471 IMMUNIZATION ADMIN: CPT | Performed by: INTERNAL MEDICINE

## 2025-01-28 RX ORDER — TRIAMCINOLONE ACETONIDE 0.25 MG/G
1 OINTMENT TOPICAL 2 TIMES DAILY
Qty: 45 G | Refills: 1 | Status: SHIPPED | OUTPATIENT
Start: 2025-01-28

## 2025-01-28 NOTE — PROGRESS NOTES
Subjective   The ABCs of the Annual Wellness Visit  Medicare Wellness Visit      Brandy El is a 64 y.o. patient who presents for a Medicare Wellness Visit.    The following portions of the patient's history were reviewed and   updated as appropriate: allergies, current medications, past family history, past medical history, past social history, past surgical history, and problem list.    Compared to one year ago, the patient's physical   health is the same.  Compared to one year ago, the patient's mental   health is the same.    Recent Hospitalizations:  This patient has had a [facility] admission record on file within the last 365 days.  Current Medical Providers:  Patient Care Team:  Tiffanie Woodard MD as PCP - General  Tiffanie Woodard MD as PCP - Family Medicine    Outpatient Medications Prior to Visit   Medication Sig Dispense Refill   • amLODIPine (NORVASC) 2.5 MG tablet TAKE 1 TABLET BY MOUTH DAILY 90 tablet 1   • atorvastatin (LIPITOR) 20 MG tablet TAKE 1 TABLET BY MOUTH DAILY 90 tablet 1   • buPROPion XL (WELLBUTRIN XL) 150 MG 24 hr tablet Take 3 tablets by mouth Every Morning.     • buPROPion XL (WELLBUTRIN XL) 300 MG 24 hr tablet      • clonazePAM (KlonoPIN) 1 MG tablet 1.5 tablets. Takes 1 1/2 tabs as needed.     • escitalopram (LEXAPRO) 10 MG tablet Take 2 tablets by mouth Daily. Takes a total of 20mg daily     • omega-3 acid ethyl esters (LOVAZA) 1 g capsule TAKE 2 CAPSULES BY MOUTH TWICE A  capsule 3   • propranolol LA (INDERAL LA) 60 MG 24 hr capsule TAKE 1 CAPSULE BY MOUTH DAILY 30 capsule 4   • traZODone (DESYREL) 50 MG tablet      • valsartan (DIOVAN) 320 MG tablet TAKE 1 TABLET BY MOUTH DAILY 90 tablet 3   • cetirizine (zyrTEC) 10 MG tablet Take 1 tablet by mouth Daily for 30 days. 30 tablet 0   • clindamycin (CLEOCIN) 300 MG capsule Take 1 capsule by mouth 3 (Three) Times a Day. (Patient not taking: Reported on 1/28/2025) 21 capsule 0   • ondansetron (Zofran) 4 MG tablet Take 1  "tablet by mouth Every 8 (Eight) Hours As Needed for Nausea or Vomiting. (Patient not taking: Reported on 1/28/2025) 20 tablet 0     No facility-administered medications prior to visit.     No opioid medication identified on active medication list. I have reviewed chart for other potential  high risk medication/s and harmful drug interactions in the elderly.      Aspirin is not on active medication list.  Aspirin use is not indicated based on review of current medical condition/s. Risk of harm outweighs potential benefits.  .    Patient Active Problem List   Diagnosis   • Health care maintenance   • Hyperlipidemia   • History of MRSA infection   • Dyspepsia   • Change in bowel habits   • FH: esophageal cancer   • Anxiety   • Cellulitis of right lower extremity   • Sepsis     Advance Care Planning {Advance Care Planning Hyperlink:23}Advance Directive is not on file.  ACP discussion was held with the patient during this visit. Patient has an advance directive (not in EMR), copy requested.            Objective   Vitals:    01/28/25 1520   BP: 146/84   Pulse: 56   SpO2: 96%   Weight: 78.9 kg (174 lb)   Height: 157.5 cm (62\")       Estimated body mass index is 31.83 kg/m² as calculated from the following:    Height as of this encounter: 157.5 cm (62\").    Weight as of this encounter: 78.9 kg (174 lb).    BMI is >= 30 and <35. (Class 1 Obesity). The following options were offered after discussion;: exercise counseling/recommendations and nutrition counseling/recommendations    {Help Text;  If you change Medicare Wellness reason for visit to a Welcome to Medicare reason for visit after the encounter has started, please add SmartPhrase .GAITBALANCE to your note for proper gait and balance documentation. This text will disappear after the note is signed:36268}       Does the patient have evidence of cognitive impairment? No  Lab Results   Component Value Date    CHLPL 200 01/24/2025    TRIG 197 (H) 01/24/2025    HDL 42 " 01/24/2025     (H) 01/24/2025    VLDL 35 01/24/2025                                                                                                Health  Risk Assessment    Smoking Status:  Social History     Tobacco Use   Smoking Status Never   Smokeless Tobacco Never     Alcohol Consumption:  Social History     Substance and Sexual Activity   Alcohol Use Not Currently   • Alcohol/week: 2.0 standard drinks of alcohol   • Types: 2 Shots of liquor per week    Comment: 2 x weekly       Fall Risk Screen{Jump to Steadi Fall Risk Flowsheet:23}  STEADI Fall Risk Assessment has not been completed.    Depression Screening{Jump to PHQ SmartForm:23}   Little interest or pleasure in doing things? Not at all   Feeling down, depressed, or hopeless? Not at all   PHQ-2 Total Score 0      Health Habits and Functional and Cognitive Screening:       No data to display                    Age-appropriate Screening Schedule:  Refer to the list below for future screening recommendations based on patient's age, sex and/or medical conditions. Orders for these recommended tests are listed in the plan section. The patient has been provided with a written plan.    Health Maintenance List  Health Maintenance   Topic Date Due   • ZOSTER VACCINE (1 of 2) Never done   • BMI FOLLOWUP  05/06/2020   • PAP SMEAR  08/19/2022   • COLORECTAL CANCER SCREENING  12/03/2023   • INFLUENZA VACCINE  07/01/2024   • ANNUAL PHYSICAL  09/19/2024   • COVID-19 Vaccine (3 - 2024-25 season) 01/30/2025 (Originally 9/1/2024)   • MAMMOGRAM  10/13/2025   • LIPID PANEL  01/24/2026   • TDAP/TD VACCINES (3 - Td or Tdap) 09/16/2032   • HEPATITIS C SCREENING  Completed   • Pneumococcal Vaccine 0-64  Aged Out                                                                                                                                                CMS Preventative Services Quick Reference  Risk Factors Identified During Encounter  Immunizations Discussed/Encouraged:  "Influenza and Shingrix    The above risks/problems have been discussed with the patient.  Pertinent information has been shared with the patient in the After Visit Summary.  An After Visit Summary and PPPS were made available to the patient.    Follow Up:{Wrapup  Review (Popup)  Advance Care Planning  Labs  CC  Problem List  Visit Diagnosis  Medications  Result Review  Imaging  Health [facility]  BestPractice  SmartSets  SnapShot  Encounters  Notes  Media  Procedures :23}   Next Medicare Wellness visit to be scheduled in 1 year.         Additional E&M Note during same encounter follows:  Patient has additional, significant, and separately identifiable condition(s)/problem(s) that require work above and beyond the Medicare Wellness Visit     Chief Complaint  Annual Exam    Subjective {Problem List  Visit Diagnosis   Encounters  Notes  Medications  Labs  Result Review Imaging  Media :23}{Help Text;  If performing a Preventative Medicine Visit (e.g. 77734) in addition to AWV, choose the 1st SmartList option below and document any ROS/PE performed.  If performing a separately identifiable E/M service (e.g. 03612), choose 2nd SmartLink option below. This information in red will not appear in the final note after note is signed:15805}  HPI  Brandy is also being seen today for an annual adult preventative physical exam.  and Brandy is also being seen today for additional medical problem/s.                Objective   Vital Signs:  /84   Pulse 56   Ht 157.5 cm (62\")   Wt 78.9 kg (174 lb)   SpO2 96%   BMI 31.83 kg/m²   Physical Exam    {The following data was reviewed by (Optional):30634}  {Data reviewed (Optional):96670:::1}  {Ambulatory Labs (Optional):45196}          Assessment and Plan {CC Problem List  Visit Diagnosis   ROS  Review (Popup)  [facility]  BestPractice  Medications  SmartSets  SnapShot Encounters  Media :23}{Help Text; When performing " an adult Preventative Medicine Visit (e.g. 54953) using the optional SmartList below can help when documenting any age-appropriate advice given.  When using the SmartList review and update the information based on the unique discussion or advice given to the patient.  As a reminder, diagnosis code Z00.00 (nl exam) or Z00.01 (abnl exam), should be added when this service is performed.  Text will disappear once the note is signed:62881}{Preventative Physical Additional Health Advice List (Optional):[id number]}           Depression, unspecified depression type  {Depression A/P Block (Optional):76702}               {Time Spent (Optional):03015}  Follow Up {Instructions Charge Capture  Follow-up Communications :23}  No follow-ups on file.  Patient was given instructions and counseling regarding her condition or for health maintenance advice. Please see specific information pulled into the AVS if appropriate.

## 2025-01-28 NOTE — PROGRESS NOTES
Subjective   CPE  IFG  Hypertension  Depression    Brandy El is a 64 y.o. female who presents for a complete physical exam, to review chronic issues,a nd to address acute needs. Has htn . Not routinely testing at home. Depressed/ grieving loss of spouse x 3 years ago. Much of her time is sedentary/ in a chair. She does enjoy her 5 grandchildren and is more active when they are around. She has ifg on labs.         Review of Systems   Constitutional: Negative.    HENT: Negative.     Eyes: Negative.    Respiratory: Negative.     Cardiovascular: Negative.    Gastrointestinal: Negative.    Endocrine: Negative.    Genitourinary: Negative.    Musculoskeletal: Negative.    Skin: Negative.    Allergic/Immunologic: Negative.    Neurological: Negative.    Hematological: Negative.    Psychiatric/Behavioral:  Positive for sleep disturbance.         Depressed mood       The following portions of the patient's history were reviewed and updated as appropriate: allergies, current medications, past family history, past medical history, past social history, past surgical history, and problem list.     Patient Active Problem List   Diagnosis    Health care maintenance    Hyperlipidemia    History of MRSA infection    Dyspepsia    Change in bowel habits    FH: esophageal cancer    Anxiety    Cellulitis of right lower extremity    Sepsis       Past Medical History:   Diagnosis Date    Anxiety     ASCUS of cervix with negative high risk HPV 09/2001    ASCUS of cervix with negative high risk HPV 09/2005    Colon polyp 2013    Depression     Essential hematuria     Gait disturbance     R foot drop    Health care maintenance 06/14/2016    Hyperlipidemia     Hypertension 2020    Irritable bowel syndrome many years ago    Postmenopausal     Vaginal delivery        Past Surgical History:   Procedure Laterality Date    ANKLE SURGERY      BACK SURGERY      COLONOSCOPY  10/22/2013    tics, IH, HP    COLONOSCOPY N/A 12/03/2018    Procedure:  COLONOSCOPY TO CECUM AND INTO TERMINAL ILEUM WITH COLD BIOPSIES AND COLD BIOPSY POLYPECTOMY;  Surgeon: Syd Emerson MD;  Location: [facility] ENDOSCOPY;  Service: Gastroenterology    ENDOSCOPY N/A 12/03/2018    Procedure: ESOPHAGOGASTRODUODENOSCOPY WITH COLD BIOPSIES;  Surgeon: Syd Emerson MD;  Location: [facility] ENDOSCOPY;  Service: Gastroenterology    LAPAROSCOPIC TUBAL LIGATION Bilateral     TEAR DUCT SURGERY  06/2017    TUBAL ABDOMINAL LIGATION         Family History   Problem Relation Age of Onset    COPD Mother     Heart disease Father     Heart disease Brother     Cancer Brother     COPD Brother     Breast cancer Sister     No Known Problems Daughter     No Known Problems Son     No Known Problems Maternal Grandmother     No Known Problems Paternal Grandmother     No Known Problems Maternal Aunt     No Known Problems Paternal Aunt     BRCA 1/2 Neg Hx     Colon cancer Neg Hx     Endometrial cancer Neg Hx     Ovarian cancer Neg Hx        Social History     Socioeconomic History    Marital status:    Tobacco Use    Smoking status: Never    Smokeless tobacco: Never   Vaping Use    Vaping status: Never Used   Substance and Sexual Activity    Alcohol use: Not Currently     Alcohol/week: 2.0 standard drinks of alcohol     Types: 2 Shots of liquor per week     Comment: 2 x weekly    Drug use: No    Sexual activity: Not Currently     Partners: Male     Birth control/protection: Post-menopausal       Current Outpatient Medications on File Prior to Visit   Medication Sig Dispense Refill    amLODIPine (NORVASC) 2.5 MG tablet TAKE 1 TABLET BY MOUTH DAILY 90 tablet 1    atorvastatin (LIPITOR) 20 MG tablet TAKE 1 TABLET BY MOUTH DAILY 90 tablet 1    buPROPion XL (WELLBUTRIN XL) 150 MG 24 hr tablet Take 3 tablets by mouth Every Morning.      buPROPion XL (WELLBUTRIN XL) 300 MG 24 hr tablet       clonazePAM (KlonoPIN) 1 MG tablet 1.5 tablets. Takes 1 1/2 tabs as needed.      escitalopram (LEXAPRO) 10 MG tablet Take 2  "tablets by mouth Daily. Takes a total of 20mg daily      omega-3 acid ethyl esters (LOVAZA) 1 g capsule TAKE 2 CAPSULES BY MOUTH TWICE A  capsule 3    propranolol LA (INDERAL LA) 60 MG 24 hr capsule TAKE 1 CAPSULE BY MOUTH DAILY 30 capsule 4    traZODone (DESYREL) 50 MG tablet       valsartan (DIOVAN) 320 MG tablet TAKE 1 TABLET BY MOUTH DAILY 90 tablet 3    cetirizine (zyrTEC) 10 MG tablet Take 1 tablet by mouth Daily for 30 days. 30 tablet 0    [DISCONTINUED] clindamycin (CLEOCIN) 300 MG capsule Take 1 capsule by mouth 3 (Three) Times a Day. (Patient not taking: Reported on 1/28/2025) 21 capsule 0    [DISCONTINUED] ondansetron (Zofran) 4 MG tablet Take 1 tablet by mouth Every 8 (Eight) Hours As Needed for Nausea or Vomiting. (Patient not taking: Reported on 1/28/2025) 20 tablet 0     No current facility-administered medications on file prior to visit.       Allergies   Allergen Reactions    Phenergan [Promethazine Hcl] Anxiety       Immunization History   Administered Date(s) Administered    COVID-19 (PFIZER) Purple Cap Monovalent 03/01/2021, 04/01/2021    Flu Vaccine Quad PF >36MO 10/01/2022    Fluzone  >6mos 01/28/2025    Fluzone (or Fluarix & Flulaval for VFC) >6mos 10/01/2022, 09/19/2023    Hepatitis A 08/13/2018, 08/19/2019    Tdap 06/25/2013, 09/16/2022       Objective     /76   Pulse 56   Ht 157.5 cm (62\")   Wt 78.9 kg (174 lb)   SpO2 96%   BMI 31.83 kg/m²     Physical Exam  Vitals and nursing note reviewed.   Constitutional:       Appearance: Normal appearance. She is well-developed.   HENT:      Head: Normocephalic and atraumatic.      Right Ear: Tympanic membrane normal.      Left Ear: Tympanic membrane normal.      Ears:      Comments: Psoriatic changes w flaking     Nose: Nose normal.   Eyes:      Pupils: Pupils are equal, round, and reactive to light.   Cardiovascular:      Rate and Rhythm: Normal rate and regular rhythm.      Heart sounds: Normal heart sounds.   Pulmonary:      " Effort: Pulmonary effort is normal. No respiratory distress.      Breath sounds: Normal breath sounds.   Abdominal:      Palpations: Abdomen is soft.   Musculoskeletal:         General: Normal range of motion.      Cervical back: Neck supple.   Skin:     General: Skin is warm and dry.   Neurological:      General: No focal deficit present.      Mental Status: She is alert and oriented to person, place, and time.   Psychiatric:         Mood and Affect: Mood normal.         Behavior: Behavior normal.         Thought Content: Thought content normal.         Judgment: Judgment normal.         Assessment & Plan   Diagnoses and all orders for this visit:    1. Healthcare maintenance (Primary)    2. Depression, unspecified depression type  -     Cancel: Ambulatory Referral to Psychiatry    3. Need for vaccination  -     Fluzone >6mos ([id number])    4. Encounter for colorectal cancer screening  -     Ambulatory Referral For Screening Colonoscopy    5. Cervical cancer screening  -     IGP, Aptima HPV, Rfx 16 / 18,45    6. Impaired fasting glucose  -     Basic Metabolic Panel  -     Hemoglobin A1c  -     Lipid Panel With LDL / HDL Ratio    7. Hypertriglyceridemia  -     Basic Metabolic Panel  -     Hemoglobin A1c  -     Lipid Panel With LDL / HDL Ratio    8. Breast cancer screening by mammogram  -     Mammo screening digital tomosynthesis bilateral w CAD; Future    9. Hypertension, unspecified type    Other orders  -     triamcinolone (KENALOG) 0.025 % ointment; Apply 1 Application topically to the appropriate area as directed 2 (Two) Times a Day.  Dispense: 45 g; Refill: 1        Discussion    Patient presents today for a CPE.  She is counseled on healthy nutrtion and movement. She is depressed and grieving loss of spouse x 3 years ago. She is encouraged to engage in psychotherapy. She is scheduled for a f/u w psychiatry. She has contracted for safety. She is advised to start gratitude/ affirmation journal and engage in  physical activity, socialization, and creative activities.   Psoriasis- prn tcm  Ifg, increased tg- dietary mod  Htn- low sodium, caffeine avoidance, incraesed movement. Track bp and call w readings. Tana current emds.   Total visit 50 min        Patient will schedule a mammogram. Patient has been referred for colon cancer screening.   Immunizations are as per orders.   I suggested [employer] or Weight [employer] for weight loss Discussed importance of preventative care including vaccinations, age appropriate cancer screening, routine lab work, healthy diet, and active lifestyle.         Future Appointments   Date Time Provider Department Center   6/3/2025  8:30 AM Tiffanie Woodard MD [facility]   2/2/2026  8:00 AM [facility]   2/6/2026  9:15 AM Tiffanie Woodard MD [facility]

## 2025-02-01 LAB
CYTOLOGIST CVX/VAG CYTO: NORMAL
CYTOLOGY CVX/VAG DOC CYTO: NORMAL
CYTOLOGY CVX/VAG DOC THIN PREP: NORMAL
DX ICD CODE: NORMAL
HPV GENOTYPE REFLEX: NORMAL
HPV I/H RISK 4 DNA CVX QL PROBE+SIG AMP: NEGATIVE
Lab: NORMAL
OTHER STN SPEC: NORMAL
STAT OF ADQ CVX/VAG CYTO-IMP: NORMAL

## 2025-02-17 ENCOUNTER — TELEPHONE (OUTPATIENT)
Dept: INTERNAL MEDICINE | Facility: CLINIC | Age: 65
End: 2025-02-17

## 2025-02-17 NOTE — TELEPHONE ENCOUNTER
Caller: Nikko Brandy L    Relationship: Self    Best call back number:     [phone]       What is the best time to reach you: ANY    Who are you requesting to speak with (clinical staff, provider,  specific staff member): PCP    Do you know the name of the person who called:     What was the call regarding: WHEN TO URGENT ON 2/14/25. THE URGENT CARE GAVE HER BACTRIM-2 TABLETS FOR 7 DAYS FOR A UTI BUT SHE DOESN'T THINK IT IS WORKING.    Is it okay if the provider responds through MyChart:

## 2025-02-18 DIAGNOSIS — R82.79 URINE CULTURE POSITIVE: ICD-10-CM

## 2025-02-18 DIAGNOSIS — A49.8 ENTEROCOCCUS FAECALIS INFECTION: Primary | ICD-10-CM

## 2025-02-18 RX ORDER — NITROFURANTOIN 25; 75 MG/1; MG/1
100 CAPSULE ORAL 2 TIMES DAILY
Qty: 14 CAPSULE | Refills: 0 | Status: SHIPPED | OUTPATIENT
Start: 2025-02-18 | End: 2025-02-25

## 2025-02-26 RX ORDER — PROPRANOLOL HYDROCHLORIDE 60 MG/1
60 CAPSULE, EXTENDED RELEASE ORAL DAILY
Qty: 30 CAPSULE | Refills: 4 | Status: SHIPPED | OUTPATIENT
Start: 2025-02-26

## 2025-03-11 ENCOUNTER — OFFICE VISIT (OUTPATIENT)
Dept: INTERNAL MEDICINE | Facility: CLINIC | Age: 65
End: 2025-03-11
Payer: COMMERCIAL

## 2025-03-11 ENCOUNTER — HOSPITAL ENCOUNTER (OUTPATIENT)
Facility: HOSPITAL | Age: 65
Discharge: HOME | End: 2025-03-11
Admitting: INTERNAL MEDICINE
Payer: COMMERCIAL

## 2025-03-11 VITALS
HEIGHT: 63 IN | OXYGEN SATURATION: 96 % | DIASTOLIC BLOOD PRESSURE: 77 MMHG | SYSTOLIC BLOOD PRESSURE: 147 MMHG | HEART RATE: 69 BPM | WEIGHT: 170 LBS | TEMPERATURE: 98.4 F | RESPIRATION RATE: 10 BRPM | BODY MASS INDEX: 30.12 KG/M2

## 2025-03-11 DIAGNOSIS — N39.0 URINARY TRACT INFECTION WITH HEMATURIA, SITE UNSPECIFIED: ICD-10-CM

## 2025-03-11 DIAGNOSIS — R31.0 GROSS HEMATURIA: ICD-10-CM

## 2025-03-11 DIAGNOSIS — M54.50 ACUTE LEFT-SIDED LOW BACK PAIN, UNSPECIFIED WHETHER SCIATICA PRESENT: ICD-10-CM

## 2025-03-11 DIAGNOSIS — R31.9 URINARY TRACT INFECTION WITH HEMATURIA, SITE UNSPECIFIED: ICD-10-CM

## 2025-03-11 DIAGNOSIS — R30.9 URINARY PAIN: Primary | ICD-10-CM

## 2025-03-11 DIAGNOSIS — N30.01 ACUTE CYSTITIS WITH HEMATURIA: ICD-10-CM

## 2025-03-11 DIAGNOSIS — R30.9 URINARY PAIN: ICD-10-CM

## 2025-03-11 LAB
BILIRUB BLD-MCNC: NEGATIVE MG/DL
CLARITY, POC: ABNORMAL
COLOR UR: ABNORMAL
EXPIRATION DATE: ABNORMAL
GLUCOSE UR STRIP-MCNC: NEGATIVE MG/DL
KETONES UR QL: NEGATIVE
LEUKOCYTE EST, POC: NEGATIVE
Lab: ABNORMAL
NITRITE UR-MCNC: NEGATIVE MG/ML
PH UR: 5.5 [PH] (ref 5–8)
PROT UR STRIP-MCNC: NEGATIVE MG/DL
RBC # UR STRIP: ABNORMAL /UL
SP GR UR: 1.02 (ref 1–1.03)
UROBILINOGEN UR QL: ABNORMAL

## 2025-03-11 PROCEDURE — 74176 CT ABD & PELVIS W/O CONTRAST: CPT

## 2025-03-11 RX ORDER — PHENAZOPYRIDINE HYDROCHLORIDE 200 MG/1
200 TABLET, FILM COATED ORAL 3 TIMES DAILY PRN
Qty: 15 TABLET | Refills: 0 | Status: SHIPPED | OUTPATIENT
Start: 2025-03-11

## 2025-03-11 RX ORDER — SACCHAROMYCES BOULARDII 250 MG
250 CAPSULE ORAL 2 TIMES DAILY
Qty: 20 CAPSULE | Refills: 0 | Status: SHIPPED | OUTPATIENT
Start: 2025-03-11

## 2025-03-11 RX ORDER — HYDROCODONE BITARTRATE AND ACETAMINOPHEN 5; 325 MG/1; MG/1
1 TABLET ORAL EVERY 6 HOURS PRN
Qty: 12 TABLET | Refills: 0 | Status: SHIPPED | OUTPATIENT
Start: 2025-03-11

## 2025-03-11 RX ORDER — CIPROFLOXACIN 500 MG/1
500 TABLET, FILM COATED ORAL 2 TIMES DAILY
Qty: 14 TABLET | Refills: 0 | Status: SHIPPED | OUTPATIENT
Start: 2025-03-11 | End: 2025-03-18

## 2025-03-11 NOTE — PROGRESS NOTES
Chief Complaint   Patient presents with    Urinary Tract Infection     Pt, C/o Back pain, pelvic pain and pressure, burning on urination  urgency Sx. Started on Friday 3/7  Been taking OTC AZO        Urinary Tract Infection   Associated symptoms include flank pain, frequency, hematuria and urgency.      Brandy El is a 64 y.o. female presents for acute care. Patient having recurerent UTI. Symptoms w waking in the middle of the night w pain in the back radiating around. She had gross hematuria initially but not now. Has been treated for UTI w bactrim less than one month ago. She has macrobid now.           The following portions of the patient's history were reviewed and updated as appropriate: allergies, current medications, past family history, past medical history, past social history, past surgical history and problem list.  Current Outpatient Medications on File Prior to Visit   Medication Sig Dispense Refill    amLODIPine (NORVASC) 2.5 MG tablet TAKE 1 TABLET BY MOUTH DAILY 90 tablet 1    atorvastatin (LIPITOR) 20 MG tablet TAKE 1 TABLET BY MOUTH DAILY 90 tablet 1    buPROPion XL (WELLBUTRIN XL) 150 MG 24 hr tablet Take 3 tablets by mouth Every Morning.      buPROPion XL (WELLBUTRIN XL) 300 MG 24 hr tablet       clonazePAM (KlonoPIN) 1 MG tablet 1.5 tablets. Takes 1 1/2 tabs as needed.      escitalopram (LEXAPRO) 20 MG tablet       omega-3 acid ethyl esters (LOVAZA) 1 g capsule TAKE 2 CAPSULES BY MOUTH TWICE A  capsule 3    propranolol LA (INDERAL LA) 60 MG 24 hr capsule TAKE 1 CAPSULE BY MOUTH DAILY 30 capsule 4    traZODone (DESYREL) 50 MG tablet       triamcinolone (KENALOG) 0.025 % ointment Apply 1 Application topically to the appropriate area as directed 2 (Two) Times a Day. 45 g 1    valsartan (DIOVAN) 320 MG tablet TAKE 1 TABLET BY MOUTH DAILY 90 tablet 3    [DISCONTINUED] nitrofurantoin, macrocrystal-monohydrate, (MACROBID) 100 MG capsule Take 1 capsule by mouth 2 (Two) Times a Day for 7 days.  14 capsule 0    [DISCONTINUED] phenazopyridine (PYRIDIUM) 200 MG tablet Take 1 tablet by mouth 3 (Three) Times a Day As Needed for Bladder Spasms or Dysuria. 6 tablet 0    cetirizine (zyrTEC) 10 MG tablet Take 1 tablet by mouth Daily for 30 days. 30 tablet 0     No current facility-administered medications on file prior to visit.     Review of Systems   Constitutional:  Positive for fatigue. Negative for fever.   HENT: Negative.     Eyes: Negative.    Respiratory: Negative.     Cardiovascular: Negative.    Gastrointestinal: Negative.    Endocrine: Negative.    Genitourinary:  Positive for flank pain, frequency, hematuria and urgency.   Allergic/Immunologic: Negative.    Neurological: Negative.    Hematological: Negative.    Psychiatric/Behavioral: Negative.         Objective   Physical Exam  Vitals and nursing note reviewed.   Constitutional:       Appearance: Normal appearance. She is well-developed.   HENT:      Head: Normocephalic and atraumatic.      Right Ear: Tympanic membrane and external ear normal.      Left Ear: Tympanic membrane and external ear normal.      Nose: Nose normal.      Mouth/Throat:      Mouth: Mucous membranes are moist.   Eyes:      Extraocular Movements: Extraocular movements intact.      Pupils: Pupils are equal, round, and reactive to light.   Cardiovascular:      Rate and Rhythm: Normal rate and regular rhythm.      Pulses: Normal pulses.      Heart sounds: Normal heart sounds.   Pulmonary:      Effort: Pulmonary effort is normal. No respiratory distress.      Breath sounds: Normal breath sounds.   Abdominal:      General: Abdomen is flat.      Palpations: Abdomen is soft.   Musculoskeletal:         General: Normal range of motion.      Cervical back: Normal range of motion and neck supple.   Skin:     General: Skin is warm and dry.   Neurological:      General: No focal deficit present.      Mental Status: She is alert and oriented to person, place, and time.   Psychiatric:          "Mood and Affect: Mood normal.         Behavior: Behavior normal.         Thought Content: Thought content normal.          /77 (BP Location: Left arm, Patient Position: Sitting, Cuff Size: Adult)   Pulse 69   Temp 98.4 °F (36.9 °C) (Oral)   Resp 10   Ht 160 cm (63\")   Wt 77.1 kg (170 lb)   SpO2 96%   BMI 30.11 kg/m²     Assessment & Plan   Diagnoses and all orders for this visit:    Urinary pain  -     POCT urinalysis dipstick, automated  -     Cancel: CT Abdomen Pelvis Without Contrast; Future  -     CT Abdomen Pelvis Without Contrast; Future  -     Ambulatory Referral to Urology  -     HYDROcodone-acetaminophen (NORCO) 5-325 MG per tablet; Take 1 tablet by mouth Every 6 (Six) Hours As Needed for Moderate Pain.    Gross hematuria  -     Cancel: CT Abdomen Pelvis Without Contrast; Future  -     CT Abdomen Pelvis Without Contrast; Future  -     Ambulatory Referral to Urology  -     HYDROcodone-acetaminophen (NORCO) 5-325 MG per tablet; Take 1 tablet by mouth Every 6 (Six) Hours As Needed for Moderate Pain.    Acute left-sided low back pain, unspecified whether sciatica present  -     Cancel: CT Abdomen Pelvis Without Contrast; Future  -     CT Abdomen Pelvis Without Contrast; Future  -     Ambulatory Referral to Urology  -     HYDROcodone-acetaminophen (NORCO) 5-325 MG per tablet; Take 1 tablet by mouth Every 6 (Six) Hours As Needed for Moderate Pain.    Acute cystitis with hematuria  -     Cancel: CT Abdomen Pelvis Without Contrast; Future  -     CT Abdomen Pelvis Without Contrast; Future  -     Ambulatory Referral to Urology  -     HYDROcodone-acetaminophen (NORCO) 5-325 MG per tablet; Take 1 tablet by mouth Every 6 (Six) Hours As Needed for Moderate Pain.    Urinary tract infection with hematuria, site unspecified  -     phenazopyridine (PYRIDIUM) 200 MG tablet; Take 1 tablet by mouth 3 (Three) Times a Day As Needed for Bladder Spasms or Dysuria.  -     HYDROcodone-acetaminophen (NORCO) 5-325 MG per " tablet; Take 1 tablet by mouth Every 6 (Six) Hours As Needed for Moderate Pain.    Other orders  -     ciprofloxacin (Cipro) 500 MG tablet; Take 1 tablet by mouth 2 (Two) Times a Day for 7 days.  -     saccharomyces boulardii (Florastor) 250 MG capsule; Take 1 capsule by mouth 2 (Two) Times a Day.      Patient w recurrent uti. Given hematuria and back pain need to evaluate for kidney stone. STAT CT w urology referral placed. To hydrate well. [place] tabs. Pyridium prn. Hydrocodone if needed. To ER if intense unremittent pain.

## 2025-03-13 RX ORDER — AMLODIPINE BESYLATE 2.5 MG/1
2.5 TABLET ORAL DAILY
Qty: 90 TABLET | Refills: 1 | Status: SHIPPED | OUTPATIENT
Start: 2025-03-13

## 2025-04-26 ENCOUNTER — APPOINTMENT (OUTPATIENT)
Dept: GENERAL RADIOLOGY | Facility: HOSPITAL | Age: 65
End: 2025-04-26
Payer: COMMERCIAL

## 2025-04-26 ENCOUNTER — HOSPITAL ENCOUNTER (OUTPATIENT)
Facility: HOSPITAL | Age: 65
Setting detail: OBSERVATION
Discharge: HOME | End: 2025-04-28
Attending: EMERGENCY MEDICINE | Admitting: HOSPITALIST
Payer: COMMERCIAL

## 2025-04-26 DIAGNOSIS — L03.115 CELLULITIS OF RIGHT LOWER EXTREMITY: Primary | ICD-10-CM

## 2025-04-26 DIAGNOSIS — R30.0 DYSURIA: ICD-10-CM

## 2025-04-26 PROBLEM — L03.119 RECURRENT CELLULITIS OF LOWER LEG: Status: ACTIVE | Noted: 2025-04-26

## 2025-04-26 PROBLEM — G62.9 NEUROPATHY: Status: ACTIVE | Noted: 2025-04-26

## 2025-04-26 PROBLEM — M21.371 RIGHT FOOT DROP: Status: ACTIVE | Noted: 2025-04-26

## 2025-04-26 PROBLEM — Z98.890 HISTORY OF ANKLE SURGERY: Status: ACTIVE | Noted: 2025-04-26

## 2025-04-26 PROBLEM — I10 HYPERTENSION, ESSENTIAL: Status: ACTIVE | Noted: 2025-04-26

## 2025-04-26 PROBLEM — R50.9 FEVER: Status: ACTIVE | Noted: 2025-04-26

## 2025-04-26 LAB
ALBUMIN SERPL-MCNC: 4.1 G/DL (ref 3.5–5.2)
ALBUMIN/GLOB SERPL: 1.4 G/DL
ALP SERPL-CCNC: 71 U/L (ref 39–117)
ALT SERPL W P-5'-P-CCNC: 25 U/L (ref 1–33)
ANION GAP SERPL CALCULATED.3IONS-SCNC: 10 MMOL/L (ref 5–15)
AST SERPL-CCNC: 20 U/L (ref 1–32)
BACTERIA UR QL AUTO: ABNORMAL /HPF
BASOPHILS # BLD AUTO: 0.02 10*3/MM3 (ref 0–0.2)
BASOPHILS NFR BLD AUTO: 0.3 % (ref 0–1.5)
BILIRUB SERPL-MCNC: 0.9 MG/DL (ref 0–1.2)
BILIRUB UR QL STRIP: NEGATIVE
BUN SERPL-MCNC: 11 MG/DL (ref 8–23)
BUN/CREAT SERPL: 15.9 (ref 7–25)
CALCIUM SPEC-SCNC: 8.8 MG/DL (ref 8.6–10.5)
CHLORIDE SERPL-SCNC: 102 MMOL/L (ref 98–107)
CLARITY UR: CLEAR
CO2 SERPL-SCNC: 26 MMOL/L (ref 22–29)
COLOR UR: ABNORMAL
CREAT SERPL-MCNC: 0.69 MG/DL (ref 0.57–1)
D-LACTATE SERPL-SCNC: 1 MMOL/L (ref 0.5–2)
DEPRECATED RDW RBC AUTO: 45.5 FL (ref 37–54)
EGFRCR SERPLBLD CKD-EPI 2021: 98.3 ML/MIN/1.73
EOSINOPHIL # BLD AUTO: 0.01 10*3/MM3 (ref 0–0.4)
EOSINOPHIL NFR BLD AUTO: 0.1 % (ref 0.3–6.2)
ERYTHROCYTE [DISTWIDTH] IN BLOOD BY AUTOMATED COUNT: 13.5 % (ref 12.3–15.4)
GLOBULIN UR ELPH-MCNC: 2.9 GM/DL
GLUCOSE SERPL-MCNC: 101 MG/DL (ref 65–99)
GLUCOSE UR STRIP-MCNC: NEGATIVE MG/DL
HCT VFR BLD AUTO: 40 % (ref 34–46.6)
HGB BLD-MCNC: 13.3 G/DL (ref 12–15.9)
HGB UR QL STRIP.AUTO: ABNORMAL
HYALINE CASTS UR QL AUTO: ABNORMAL /LPF
IMM GRANULOCYTES # BLD AUTO: 0.03 10*3/MM3 (ref 0–0.05)
IMM GRANULOCYTES NFR BLD AUTO: 0.4 % (ref 0–0.5)
KETONES UR QL STRIP: ABNORMAL
LEUKOCYTE ESTERASE UR QL STRIP.AUTO: ABNORMAL
LYMPHOCYTES # BLD AUTO: 0.45 10*3/MM3 (ref 0.7–3.1)
LYMPHOCYTES NFR BLD AUTO: 6.5 % (ref 19.6–45.3)
MCH RBC QN AUTO: 30.2 PG (ref 26.6–33)
MCHC RBC AUTO-ENTMCNC: 33.3 G/DL (ref 31.5–35.7)
MCV RBC AUTO: 90.9 FL (ref 79–97)
MONOCYTES # BLD AUTO: 0.34 10*3/MM3 (ref 0.1–0.9)
MONOCYTES NFR BLD AUTO: 4.9 % (ref 5–12)
NEUTROPHILS NFR BLD AUTO: 6.07 10*3/MM3 (ref 1.7–7)
NEUTROPHILS NFR BLD AUTO: 87.8 % (ref 42.7–76)
NITRITE UR QL STRIP: POSITIVE
NRBC BLD AUTO-RTO: 0 /100 WBC (ref 0–0.2)
PH UR STRIP.AUTO: <=5 [PH] (ref 5–8)
PLATELET # BLD AUTO: 175 10*3/MM3 (ref 140–450)
PMV BLD AUTO: 9.5 FL (ref 6–12)
POTASSIUM SERPL-SCNC: 4.1 MMOL/L (ref 3.5–5.2)
PROT SERPL-MCNC: 7 G/DL (ref 6–8.5)
PROT UR QL STRIP: NEGATIVE
RBC # BLD AUTO: 4.4 10*6/MM3 (ref 3.77–5.28)
RBC # UR STRIP: ABNORMAL /HPF
REF LAB TEST METHOD: ABNORMAL
SODIUM SERPL-SCNC: 138 MMOL/L (ref 136–145)
SP GR UR STRIP: 1.02 (ref 1–1.03)
SQUAMOUS #/AREA URNS HPF: ABNORMAL /HPF
UROBILINOGEN UR QL STRIP: ABNORMAL
WBC # UR STRIP: ABNORMAL /HPF
WBC NRBC COR # BLD AUTO: 6.92 10*3/MM3 (ref 3.4–10.8)

## 2025-04-26 PROCEDURE — 25010000002 CEFTRIAXONE PER 250 MG: Performed by: PHYSICIAN ASSISTANT

## 2025-04-26 PROCEDURE — G0378 HOSPITAL OBSERVATION PER HR: HCPCS

## 2025-04-26 PROCEDURE — 87040 BLOOD CULTURE FOR BACTERIA: CPT | Performed by: PHYSICIAN ASSISTANT

## 2025-04-26 PROCEDURE — 80053 COMPREHEN METABOLIC PANEL: CPT | Performed by: PHYSICIAN ASSISTANT

## 2025-04-26 PROCEDURE — 36415 COLL VENOUS BLD VENIPUNCTURE: CPT

## 2025-04-26 PROCEDURE — 85025 COMPLETE CBC W/AUTO DIFF WBC: CPT | Performed by: PHYSICIAN ASSISTANT

## 2025-04-26 PROCEDURE — 81001 URINALYSIS AUTO W/SCOPE: CPT | Performed by: PHYSICIAN ASSISTANT

## 2025-04-26 PROCEDURE — 25810000003 SODIUM CHLORIDE 0.9 % SOLUTION: Performed by: PHYSICIAN ASSISTANT

## 2025-04-26 PROCEDURE — 96365 THER/PROPH/DIAG IV INF INIT: CPT

## 2025-04-26 PROCEDURE — 83605 ASSAY OF LACTIC ACID: CPT | Performed by: PHYSICIAN ASSISTANT

## 2025-04-26 PROCEDURE — 73600 X-RAY EXAM OF ANKLE: CPT

## 2025-04-26 PROCEDURE — 25010000002 CEFAZOLIN PER 500 MG: Performed by: INTERNAL MEDICINE

## 2025-04-26 PROCEDURE — 99285 EMERGENCY DEPT VISIT HI MDM: CPT

## 2025-04-26 PROCEDURE — 96372 THER/PROPH/DIAG INJ SC/IM: CPT

## 2025-04-26 PROCEDURE — 25010000002 ENOXAPARIN PER 10 MG: Performed by: INTERNAL MEDICINE

## 2025-04-26 PROCEDURE — 96367 TX/PROPH/DG ADDL SEQ IV INF: CPT

## 2025-04-26 RX ORDER — ESCITALOPRAM OXALATE 10 MG/1
20 TABLET ORAL NIGHTLY
Status: DISCONTINUED | OUTPATIENT
Start: 2025-04-26 | End: 2025-04-28 | Stop reason: HOSPADM

## 2025-04-26 RX ORDER — ACETAMINOPHEN 160 MG/5ML
650 SOLUTION ORAL EVERY 4 HOURS PRN
Status: DISCONTINUED | OUTPATIENT
Start: 2025-04-26 | End: 2025-04-28 | Stop reason: HOSPADM

## 2025-04-26 RX ORDER — ONDANSETRON 2 MG/ML
4 INJECTION INTRAMUSCULAR; INTRAVENOUS EVERY 6 HOURS PRN
Status: DISCONTINUED | OUTPATIENT
Start: 2025-04-26 | End: 2025-04-28 | Stop reason: HOSPADM

## 2025-04-26 RX ORDER — PROPRANOLOL HYDROCHLORIDE 10 MG/1
30 TABLET ORAL EVERY 12 HOURS SCHEDULED
Status: DISCONTINUED | OUTPATIENT
Start: 2025-04-26 | End: 2025-04-28 | Stop reason: HOSPADM

## 2025-04-26 RX ORDER — BISACODYL 10 MG
10 SUPPOSITORY, RECTAL RECTAL DAILY PRN
Status: DISCONTINUED | OUTPATIENT
Start: 2025-04-26 | End: 2025-04-28 | Stop reason: HOSPADM

## 2025-04-26 RX ORDER — HYDROCODONE BITARTRATE AND ACETAMINOPHEN 5; 325 MG/1; MG/1
1 TABLET ORAL EVERY 6 HOURS PRN
Refills: 0 | Status: DISCONTINUED | OUTPATIENT
Start: 2025-04-26 | End: 2025-04-28 | Stop reason: HOSPADM

## 2025-04-26 RX ORDER — BISACODYL 5 MG/1
5 TABLET, DELAYED RELEASE ORAL DAILY PRN
Status: DISCONTINUED | OUTPATIENT
Start: 2025-04-26 | End: 2025-04-28 | Stop reason: HOSPADM

## 2025-04-26 RX ORDER — ATORVASTATIN CALCIUM 20 MG/1
20 TABLET, FILM COATED ORAL DAILY
Status: DISCONTINUED | OUTPATIENT
Start: 2025-04-27 | End: 2025-04-28 | Stop reason: HOSPADM

## 2025-04-26 RX ORDER — FAMOTIDINE 20 MG/1
10 TABLET, FILM COATED ORAL 2 TIMES DAILY PRN
Status: DISCONTINUED | OUTPATIENT
Start: 2025-04-26 | End: 2025-04-28 | Stop reason: HOSPADM

## 2025-04-26 RX ORDER — SODIUM CHLORIDE 9 MG/ML
40 INJECTION, SOLUTION INTRAVENOUS AS NEEDED
Status: DISCONTINUED | OUTPATIENT
Start: 2025-04-26 | End: 2025-04-28 | Stop reason: HOSPADM

## 2025-04-26 RX ORDER — POLYETHYLENE GLYCOL 3350 17 G/17G
17 POWDER, FOR SOLUTION ORAL DAILY PRN
Status: DISCONTINUED | OUTPATIENT
Start: 2025-04-26 | End: 2025-04-28 | Stop reason: HOSPADM

## 2025-04-26 RX ORDER — ENOXAPARIN SODIUM 100 MG/ML
40 INJECTION SUBCUTANEOUS DAILY
Status: DISCONTINUED | OUTPATIENT
Start: 2025-04-26 | End: 2025-04-28 | Stop reason: HOSPADM

## 2025-04-26 RX ORDER — VALSARTAN 320 MG/1
320 TABLET ORAL DAILY
Status: DISCONTINUED | OUTPATIENT
Start: 2025-04-27 | End: 2025-04-28 | Stop reason: HOSPADM

## 2025-04-26 RX ORDER — AMLODIPINE BESYLATE 2.5 MG/1
2.5 TABLET ORAL DAILY
Status: DISCONTINUED | OUTPATIENT
Start: 2025-04-27 | End: 2025-04-28 | Stop reason: HOSPADM

## 2025-04-26 RX ORDER — SODIUM CHLORIDE 0.9 % (FLUSH) 0.9 %
3-10 SYRINGE (ML) INJECTION AS NEEDED
Status: DISCONTINUED | OUTPATIENT
Start: 2025-04-26 | End: 2025-04-28 | Stop reason: HOSPADM

## 2025-04-26 RX ORDER — ACETAMINOPHEN 325 MG/1
650 TABLET ORAL EVERY 4 HOURS PRN
Status: DISCONTINUED | OUTPATIENT
Start: 2025-04-26 | End: 2025-04-28 | Stop reason: HOSPADM

## 2025-04-26 RX ORDER — PHENAZOPYRIDINE HYDROCHLORIDE 200 MG/1
200 TABLET, FILM COATED ORAL 3 TIMES DAILY PRN
Status: DISCONTINUED | OUTPATIENT
Start: 2025-04-26 | End: 2025-04-28 | Stop reason: HOSPADM

## 2025-04-26 RX ORDER — ONDANSETRON 4 MG/1
4 TABLET, ORALLY DISINTEGRATING ORAL EVERY 6 HOURS PRN
Status: DISCONTINUED | OUTPATIENT
Start: 2025-04-26 | End: 2025-04-28 | Stop reason: HOSPADM

## 2025-04-26 RX ORDER — CLONAZEPAM 1 MG/1
1 TABLET ORAL 2 TIMES DAILY PRN
Status: DISCONTINUED | OUTPATIENT
Start: 2025-04-26 | End: 2025-04-28 | Stop reason: HOSPADM

## 2025-04-26 RX ORDER — ACETAMINOPHEN 650 MG/1
650 SUPPOSITORY RECTAL EVERY 4 HOURS PRN
Status: DISCONTINUED | OUTPATIENT
Start: 2025-04-26 | End: 2025-04-28 | Stop reason: HOSPADM

## 2025-04-26 RX ORDER — CETIRIZINE HYDROCHLORIDE 10 MG/1
10 TABLET ORAL DAILY PRN
Status: DISCONTINUED | OUTPATIENT
Start: 2025-04-26 | End: 2025-04-28 | Stop reason: HOSPADM

## 2025-04-26 RX ORDER — SODIUM CHLORIDE 0.9 % (FLUSH) 0.9 %
3 SYRINGE (ML) INJECTION EVERY 12 HOURS SCHEDULED
Status: DISCONTINUED | OUTPATIENT
Start: 2025-04-26 | End: 2025-04-28 | Stop reason: HOSPADM

## 2025-04-26 RX ORDER — AMOXICILLIN 250 MG
2 CAPSULE ORAL 2 TIMES DAILY PRN
Status: DISCONTINUED | OUTPATIENT
Start: 2025-04-26 | End: 2025-04-28 | Stop reason: HOSPADM

## 2025-04-26 RX ORDER — SACCHAROMYCES BOULARDII 250 MG
250 CAPSULE ORAL 2 TIMES DAILY
Status: DISCONTINUED | OUTPATIENT
Start: 2025-04-26 | End: 2025-04-28 | Stop reason: HOSPADM

## 2025-04-26 RX ORDER — TRAZODONE HYDROCHLORIDE 50 MG/1
50 TABLET ORAL NIGHTLY
Status: DISCONTINUED | OUTPATIENT
Start: 2025-04-26 | End: 2025-04-28 | Stop reason: HOSPADM

## 2025-04-26 RX ADMIN — TRAZODONE HYDROCHLORIDE 50 MG: 50 TABLET ORAL at 21:44

## 2025-04-26 RX ADMIN — Medication 3 ML: at 21:46

## 2025-04-26 RX ADMIN — SODIUM CHLORIDE 1000 ML: 9 INJECTION, SOLUTION INTRAVENOUS at 19:20

## 2025-04-26 RX ADMIN — Medication 2.5 MG: at 21:44

## 2025-04-26 RX ADMIN — CEFTRIAXONE 2000 MG: 2 INJECTION, POWDER, FOR SOLUTION INTRAMUSCULAR; INTRAVENOUS at 19:21

## 2025-04-26 RX ADMIN — IBUPROFEN 600 MG: 200 TABLET, FILM COATED ORAL at 19:23

## 2025-04-26 RX ADMIN — ENOXAPARIN SODIUM 40 MG: 100 INJECTION SUBCUTANEOUS at 21:44

## 2025-04-26 RX ADMIN — Medication 250 MG: at 21:44

## 2025-04-26 RX ADMIN — SODIUM CHLORIDE 2000 MG: 9 INJECTION, SOLUTION INTRAVENOUS at 21:43

## 2025-04-26 RX ADMIN — ESCITALOPRAM 20 MG: 10 TABLET, FILM COATED ORAL at 21:44

## 2025-04-26 RX ADMIN — PROPRANOLOL HYDROCHLORIDE 30 MG: 10 TABLET ORAL at 21:44

## 2025-04-26 NOTE — ED TRIAGE NOTES
Pt to ED with c/o fever and R leg cellulitis. Pt states fever has not gone below 101 with tylenol dad motrin.

## 2025-04-26 NOTE — ED PROVIDER NOTES
EMERGENCY DEPARTMENT ENCOUNTER  Room Number:  07/07  PCP: Tiffanie Woodard MD  Independent Historians: Patient      HPI:  Chief Complaint: had concerns including Fever and Wound Check.     A complete HPI/ROS/PMH/PSH/SH/FH are unobtainable due to: None    Chronic or social conditions impacting patient care (Social Determinants of Health): None      Context: The patient is a 64 y.o. female with a medical history of lipidemia, right lower extremity cellulitis, sepsis, right-sided dropfoot who presents to the ED c/o acute fever urinary symptoms and redness to her right lower leg.  She states she developed urinary symptoms of frequency and dysuria a couple days ago, was seen at urgent care and started on nitrofurantoin and Pyridium yesterday, has had 3 doses.  She also has a history of recurrent right lower extremity cellulitis and though her right leg looks normal yesterday this afternoon she has redness and swelling to her lower leg and foot.  She states she has previously seen infectious disease for it, it is unknown why it recurs.  She has had some nausea without vomiting, developed a fever last night, continues to have dysuria and urgency.  Denies any vomiting diarrhea or abdominal pain. Last took Tylenol around 3 PM.      Review of prior external notes (non-ED) -and- Review of prior external test results outside of this encounter:  Labs performed 1/24/2025 and creatinine was 0.75, sodium 144, hemoglobin 13.5    Urine culture performed to 4/24/2025 and showed a 10,000 colony count of mixed urogenital yun.    PAST MEDICAL HISTORY  Active Ambulatory Problems     Diagnosis Date Noted    Health care maintenance 06/14/2016    Hyperlipidemia 06/14/2016    History of MRSA infection 06/27/2017    Dyspepsia 10/17/2018    Change in bowel habits 10/17/2018    FH: esophageal cancer 10/17/2018    Anxiety 09/16/2022    Cellulitis of right lower extremity 06/22/2023    Sepsis 07/27/2024     Resolved Ambulatory Problems      Diagnosis Date Noted    No Resolved Ambulatory Problems     Past Medical History:   Diagnosis Date    ASCUS of cervix with negative high risk HPV 09/2001    ASCUS of cervix with negative high risk HPV 09/2005    Colon polyp 2013    Depression     Essential hematuria     Gait disturbance     Hypertension 2020    Irritable bowel syndrome many years ago    Postmenopausal     Vaginal delivery          PAST SURGICAL HISTORY  Past Surgical History:   Procedure Laterality Date    ANKLE SURGERY      BACK SURGERY      COLONOSCOPY  10/22/2013    tics, IH, HP    COLONOSCOPY N/A 12/03/2018    Procedure: COLONOSCOPY TO CECUM AND INTO TERMINAL ILEUM WITH COLD BIOPSIES AND COLD BIOPSY POLYPECTOMY;  Surgeon: Syd Emerson MD;  Location: [facility] ENDOSCOPY;  Service: Gastroenterology    ENDOSCOPY N/A 12/03/2018    Procedure: ESOPHAGOGASTRODUODENOSCOPY WITH COLD BIOPSIES;  Surgeon: Syd Emerson MD;  Location: [facility] ENDOSCOPY;  Service: Gastroenterology    LAPAROSCOPIC TUBAL LIGATION Bilateral     TEAR DUCT SURGERY  06/2017    TUBAL ABDOMINAL LIGATION           FAMILY HISTORY  Family History   Problem Relation Age of Onset    COPD Mother     Heart disease Father     Heart disease Brother     Cancer Brother     COPD Brother     Breast cancer Sister     No Known Problems Daughter     No Known Problems Son     No Known Problems Maternal Grandmother     No Known Problems Paternal Grandmother     No Known Problems Maternal Aunt     No Known Problems Paternal Aunt     BRCA 1/2 Neg Hx     Colon cancer Neg Hx     Endometrial cancer Neg Hx     Ovarian cancer Neg Hx          SOCIAL HISTORY  Social History     Socioeconomic History    Marital status:    Tobacco Use    Smoking status: Never    Smokeless tobacco: Never   Vaping Use    Vaping status: Never Used   Substance and Sexual Activity    Alcohol use: Not Currently     Alcohol/week: 2.0 standard drinks of alcohol     Types: 2 Shots of liquor per week     Comment: 2 x weekly    Drug  use: No    Sexual activity: Not Currently     Partners: Male     Birth control/protection: Post-menopausal         ALLERGIES  Phenergan [promethazine hcl]      REVIEW OF SYSTEMS  Review of Systems  Included in HPI  All systems reviewed and negative except for those discussed in HPI.      PHYSICAL EXAM    I have reviewed the triage vital signs and nursing notes.    ED Triage Vitals [04/26/25 8416]   Temp Heart Rate Resp BP SpO2   (!) 102 °F (38.9 °C) 79 16 151/75 92 %      Temp src Heart Rate Source Patient Position BP Location FiO2 (%)   Tympanic -- -- -- --       Physical Exam  GENERAL: alert, no acute distress  SKIN: Warm, dry  HENT: Normocephalic, atraumatic  EYES: no scleral icterus  CV: regular rhythm, regular rate  RESPIRATORY: normal effort, lungs clear  ABDOMEN: nondistended soft nontender normal bowel sounds no guarding or rigidity, no CVA tenderness  MUSCULOSKELETAL: no deformity.  Right-sided dropfoot noted in the right lower extremity.  There is a well demarcated area of erythema with mild tenderness over about 75% of the anterior right lower leg as well as to the dorsum of the right foot.  DP and PT pulses 2+, sensation intact distally, cap refill brisk.  NEURO: alert, moves all extremities, follows commands            LAB RESULTS  Recent Results (from the past 24 hours)   Comprehensive Metabolic Panel    Collection Time: 04/26/25  6:55 PM    Specimen: Arm, Right; Blood   Result Value Ref Range    Glucose 101 (H) 65 - 99 mg/dL    BUN 11 8 - 23 mg/dL    Creatinine 0.69 0.57 - 1.00 mg/dL    Sodium 138 136 - 145 mmol/L    Potassium 4.1 3.5 - 5.2 mmol/L    Chloride 102 98 - 107 mmol/L    CO2 26.0 22.0 - 29.0 mmol/L    Calcium 8.8 8.6 - 10.5 mg/dL    Total Protein 7.0 6.0 - 8.5 g/dL    Albumin 4.1 3.5 - 5.2 g/dL    ALT (SGPT) 25 1 - 33 U/L    AST (SGOT) 20 1 - 32 U/L    Alkaline Phosphatase 71 39 - 117 U/L    Total Bilirubin 0.9 0.0 - 1.2 mg/dL    Globulin 2.9 gm/dL    A/G Ratio 1.4 g/dL    BUN/Creatinine  Ratio 15.9 7.0 - 25.0    Anion Gap 10.0 5.0 - 15.0 mmol/L    eGFR 98.3 >60.0 mL/min/1.73   Lactic Acid, Plasma    Collection Time: 04/26/25  6:55 PM    Specimen: Arm, Right; Blood   Result Value Ref Range    Lactate 1.0 0.5 - 2.0 mmol/L   CBC Auto Differential    Collection Time: 04/26/25  6:55 PM    Specimen: Arm, Right; Blood   Result Value Ref Range    WBC 6.92 3.40 - 10.80 10*3/mm3    RBC 4.40 3.77 - 5.28 10*6/mm3    Hemoglobin 13.3 12.0 - 15.9 g/dL    Hematocrit 40.0 34.0 - 46.6 %    MCV 90.9 79.0 - 97.0 fL    MCH 30.2 26.6 - 33.0 pg    MCHC 33.3 31.5 - 35.7 g/dL    RDW 13.5 12.3 - 15.4 %    RDW-SD 45.5 37.0 - 54.0 fl    MPV 9.5 6.0 - 12.0 fL    Platelets 175 140 - 450 10*3/mm3    Neutrophil % 87.8 (H) 42.7 - 76.0 %    Lymphocyte % 6.5 (L) 19.6 - 45.3 %    Monocyte % 4.9 (L) 5.0 - 12.0 %    Eosinophil % 0.1 (L) 0.3 - 6.2 %    Basophil % 0.3 0.0 - 1.5 %    Immature Grans % 0.4 0.0 - 0.5 %    Neutrophils, Absolute 6.07 1.70 - 7.00 10*3/mm3    Lymphocytes, Absolute 0.45 (L) 0.70 - 3.10 10*3/mm3    Monocytes, Absolute 0.34 0.10 - 0.90 10*3/mm3    Eosinophils, Absolute 0.01 0.00 - 0.40 10*3/mm3    Basophils, Absolute 0.02 0.00 - 0.20 10*3/mm3    Immature Grans, Absolute 0.03 0.00 - 0.05 10*3/mm3    nRBC 0.0 0.0 - 0.2 /100 WBC   Urinalysis With Culture If Indicated - Urine, Clean Catch    Collection Time: 04/26/25  6:59 PM    Specimen: Urine, Clean Catch   Result Value Ref Range    Color, UA Dark Yellow (A) Yellow, Straw    Appearance, UA Clear Clear    pH, UA <=5.0 5.0 - 8.0    Specific Gravity, UA 1.022 1.005 - 1.030    Glucose, UA Negative Negative    Ketones, UA Trace (A) Negative    Bilirubin, UA Negative Negative    Blood, UA Moderate (2+) (A) Negative    Protein, UA Negative Negative    Leuk Esterase, UA Trace (A) Negative    Nitrite, UA Positive (A) Negative    Urobilinogen, UA 1.0 E.U./dL 0.2 - 1.0 E.U./dL   Urinalysis, Microscopic Only - Urine, Clean Catch    Collection Time: 04/26/25  6:59 PM    Specimen:  Urine, Clean Catch   Result Value Ref Range    RBC, UA 11-20 (A) None Seen, 0-2 /HPF    WBC, UA 3-5 (A) None Seen, 0-2 /HPF    Bacteria, UA None Seen None Seen /HPF    Squamous Epithelial Cells, UA 0-2 None Seen, 0-2 /HPF    Hyaline Casts, UA None Seen None Seen /LPF    Methodology Automated Microscopy          RADIOLOGY  No Radiology Exams Resulted Within Past 24 Hours      MEDICATIONS GIVEN IN ER  Medications   sodium chloride 0.9 % bolus 1,000 mL (1,000 mL Intravenous New Bag 4/26/25 1920)   cefTRIAXone (ROCEPHIN) 2,000 mg in sodium chloride 0.9 % 100 mL MBP (2,000 mg Intravenous New Bag 4/26/25 1921)   ibuprofen (ADVIL,MOTRIN) tablet 600 mg (600 mg Oral Given 4/26/25 1923)         ORDERS PLACED DURING THIS VISIT:  Orders Placed This Encounter   Procedures    Blood Culture - Blood,    Blood Culture - Blood,    Comprehensive Metabolic Panel    Lactic Acid, Plasma    CBC Auto Differential    Urinalysis With Culture If Indicated - Urine, Clean Catch    Urinalysis, Microscopic Only - Urine, Clean Catch    [facility] (on-call MD unless specified) Details    Initiate Observation Status    CBC & Differential         OUTPATIENT MEDICATION MANAGEMENT:  No current Epic-ordered facility-administered medications on file.     Current Outpatient Medications Ordered in Epic   Medication Sig Dispense Refill    amLODIPine (NORVASC) 2.5 MG tablet TAKE 1 TABLET BY MOUTH DAILY 90 tablet 1    atorvastatin (LIPITOR) 20 MG tablet TAKE 1 TABLET BY MOUTH DAILY 90 tablet 1    buPROPion XL (WELLBUTRIN XL) 150 MG 24 hr tablet Take 3 tablets by mouth Every Morning.      buPROPion XL (WELLBUTRIN XL) 300 MG 24 hr tablet       cetirizine (zyrTEC) 10 MG tablet Take 1 tablet by mouth Daily for 30 days. 30 tablet 0    clonazePAM (KlonoPIN) 1 MG tablet 1.5 tablets. Takes 1 1/2 tabs as needed.      escitalopram (LEXAPRO) 20 MG tablet       HYDROcodone-acetaminophen (NORCO) 5-325 MG per tablet Take 1 tablet by mouth Every 6 (Six) Hours As Needed for  Moderate Pain. 12 tablet 0    nitrofurantoin, macrocrystal-monohydrate, (MACROBID) 100 MG capsule Take 1 capsule by mouth 2 (Two) Times a Day for 7 days. 14 capsule 0    omega-3 acid ethyl esters (LOVAZA) 1 g capsule TAKE 2 CAPSULES BY MOUTH TWICE A  capsule 3    phenazopyridine (PYRIDIUM) 200 MG tablet Take 1 tablet by mouth 3 (Three) Times a Day As Needed for Bladder Spasms or Dysuria. 6 tablet 0    propranolol LA (INDERAL LA) 60 MG 24 hr capsule TAKE 1 CAPSULE BY MOUTH DAILY 30 capsule 4    saccharomyces boulardii (Florastor) 250 MG capsule Take 1 capsule by mouth 2 (Two) Times a Day. 20 capsule 0    traZODone (DESYREL) 50 MG tablet       triamcinolone (KENALOG) 0.025 % ointment Apply 1 Application topically to the appropriate area as directed 2 (Two) Times a Day. 45 g 1    valsartan (DIOVAN) 320 MG tablet TAKE 1 TABLET BY MOUTH DAILY 90 tablet 3         PROCEDURES  Procedures            PROGRESS, DATA ANALYSIS, CONSULTS, AND MEDICAL DECISION MAKING  All labs have been independently interpreted by me.  All radiology studies have been reviewed by me. All EKG's have been independently viewed and interpreted by me.  Discussion below represents my analysis of pertinent findings related to patient's condition, differential diagnosis, treatment plan and final disposition.    DIFFERENTIAL    My differential diagnosis includes but is not limited to sepsis, cellulitis, pyelonephritis, cystitis    Clinical Scores:                  ED Course as of 04/26/25 1954   Sat Apr 26, 2025 1929 WBC: 6.92 [KA]   1930 Hemoglobin: 13.3 [KA]   1930 Lactate: 1.0 [KA]   1930 Bacteria, UA: None Seen [KA]   1930 Glucose(!): 101 [KA]   1930 Creatinine: 0.69 [KA]   1945 Reassessed the patient and counseled her on all of her imaging results.  CBC CMP lactic acid unremarkable.  Does have rapidly progressing cellulitis of the right lower extremity with fever of 102.  She is agreeable with the plan for admission, she is receiving a dose  of IV Rocephin. [KA]   1952 I discussed the patient with Dr. Scanlon, hospitalist.  We discussed patient's history presentation workup and he agrees to admit. [KA]      ED Course User Index  [KA] Clara Thorne PA-C             AS OF 19:54 EDT VITALS:    BP - 151/75  HR - 79  TEMP - (!) 102 °F (38.9 °C) (Tympanic)  O2 SATS - 92%    COMPLEXITY OF CARE  The patient requires admission.      DIAGNOSIS  Final diagnoses:   Cellulitis of right lower extremity   Dysuria         DISPOSITION  ED Disposition       ED Disposition   Decision to Admit    Condition   --    Comment   Level of Care: Med/Surg [1]   Diagnosis: Cellulitis of right lower extremity [[id number]]   Admitting Physician: KP SCANLON [[id number]]   Attending Physician: KP SCANLON [[id number]]   Is patient appropriate for Inpatient Observation Unit?: Yes [1]                    FOLLOW UP  No follow-up provider specified.      Prescribed Medications     Medication List      No changes were made to your prescriptions during this visit.                   Please note that portions of this document were completed with a voice recognition program.    Note Disclaimer: At [facility], we believe that sharing information builds trust and better relationships. You are receiving this note because you recently visited [facility]. It is possible you will see health information before a provider has talked with you about it. This kind of information can be easy to misunderstand. To help you fully understand what it means for your health, we urge you to discuss this note with your provider.         Clara Thorne PA-C  04/26/25 1955

## 2025-04-27 LAB
ANION GAP SERPL CALCULATED.3IONS-SCNC: 8 MMOL/L (ref 5–15)
BUN SERPL-MCNC: 10 MG/DL (ref 8–23)
BUN/CREAT SERPL: 17.5 (ref 7–25)
CALCIUM SPEC-SCNC: 8.3 MG/DL (ref 8.6–10.5)
CHLORIDE SERPL-SCNC: 107 MMOL/L (ref 98–107)
CO2 SERPL-SCNC: 26 MMOL/L (ref 22–29)
CREAT SERPL-MCNC: 0.57 MG/DL (ref 0.57–1)
DEPRECATED RDW RBC AUTO: 42 FL (ref 37–54)
EGFRCR SERPLBLD CKD-EPI 2021: 102.9 ML/MIN/1.73
ERYTHROCYTE [DISTWIDTH] IN BLOOD BY AUTOMATED COUNT: 12.8 % (ref 12.3–15.4)
GLUCOSE SERPL-MCNC: 101 MG/DL (ref 65–99)
HCT VFR BLD AUTO: 34.6 % (ref 34–46.6)
HGB BLD-MCNC: 11.6 G/DL (ref 12–15.9)
MCH RBC QN AUTO: 30.1 PG (ref 26.6–33)
MCHC RBC AUTO-ENTMCNC: 33.5 G/DL (ref 31.5–35.7)
MCV RBC AUTO: 89.6 FL (ref 79–97)
PLATELET # BLD AUTO: 149 10*3/MM3 (ref 140–450)
PMV BLD AUTO: 9.5 FL (ref 6–12)
POTASSIUM SERPL-SCNC: 3.6 MMOL/L (ref 3.5–5.2)
POTASSIUM SERPL-SCNC: 4.7 MMOL/L (ref 3.5–5.2)
RBC # BLD AUTO: 3.86 10*6/MM3 (ref 3.77–5.28)
SODIUM SERPL-SCNC: 141 MMOL/L (ref 136–145)
WBC NRBC COR # BLD AUTO: 4.26 10*3/MM3 (ref 3.4–10.8)

## 2025-04-27 PROCEDURE — 96366 THER/PROPH/DIAG IV INF ADDON: CPT

## 2025-04-27 PROCEDURE — 25010000002 CEFAZOLIN PER 500 MG: Performed by: INTERNAL MEDICINE

## 2025-04-27 PROCEDURE — 96372 THER/PROPH/DIAG INJ SC/IM: CPT

## 2025-04-27 PROCEDURE — 25010000002 ONDANSETRON PER 1 MG: Performed by: INTERNAL MEDICINE

## 2025-04-27 PROCEDURE — 25010000002 ENOXAPARIN PER 10 MG: Performed by: INTERNAL MEDICINE

## 2025-04-27 PROCEDURE — 85027 COMPLETE CBC AUTOMATED: CPT | Performed by: INTERNAL MEDICINE

## 2025-04-27 PROCEDURE — 99214 OFFICE O/P EST MOD 30 MIN: CPT | Performed by: INTERNAL MEDICINE

## 2025-04-27 PROCEDURE — 84132 ASSAY OF SERUM POTASSIUM: CPT | Performed by: HOSPITALIST

## 2025-04-27 PROCEDURE — G0378 HOSPITAL OBSERVATION PER HR: HCPCS

## 2025-04-27 PROCEDURE — 96375 TX/PRO/DX INJ NEW DRUG ADDON: CPT

## 2025-04-27 PROCEDURE — 80048 BASIC METABOLIC PNL TOTAL CA: CPT | Performed by: INTERNAL MEDICINE

## 2025-04-27 RX ORDER — POTASSIUM CHLORIDE 1500 MG/1
40 TABLET, EXTENDED RELEASE ORAL EVERY 4 HOURS
Status: COMPLETED | OUTPATIENT
Start: 2025-04-27 | End: 2025-04-27

## 2025-04-27 RX ADMIN — POTASSIUM CHLORIDE 40 MEQ: 1500 TABLET, EXTENDED RELEASE ORAL at 12:43

## 2025-04-27 RX ADMIN — Medication 250 MG: at 20:54

## 2025-04-27 RX ADMIN — PROPRANOLOL HYDROCHLORIDE 30 MG: 10 TABLET ORAL at 20:54

## 2025-04-27 RX ADMIN — Medication 2.5 MG: at 20:54

## 2025-04-27 RX ADMIN — ATORVASTATIN CALCIUM 20 MG: 20 TABLET, FILM COATED ORAL at 08:56

## 2025-04-27 RX ADMIN — VALSARTAN 320 MG: 320 TABLET, FILM COATED ORAL at 08:56

## 2025-04-27 RX ADMIN — ESCITALOPRAM 20 MG: 10 TABLET, FILM COATED ORAL at 20:54

## 2025-04-27 RX ADMIN — Medication 3 ML: at 08:57

## 2025-04-27 RX ADMIN — PROPRANOLOL HYDROCHLORIDE 30 MG: 10 TABLET ORAL at 08:57

## 2025-04-27 RX ADMIN — POTASSIUM CHLORIDE 40 MEQ: 1500 TABLET, EXTENDED RELEASE ORAL at 08:57

## 2025-04-27 RX ADMIN — ENOXAPARIN SODIUM 40 MG: 100 INJECTION SUBCUTANEOUS at 08:57

## 2025-04-27 RX ADMIN — SODIUM CHLORIDE 2000 MG: 9 INJECTION, SOLUTION INTRAVENOUS at 04:58

## 2025-04-27 RX ADMIN — ONDANSETRON 4 MG: 2 INJECTION, SOLUTION INTRAMUSCULAR; INTRAVENOUS at 09:00

## 2025-04-27 RX ADMIN — SODIUM CHLORIDE 2000 MG: 9 INJECTION, SOLUTION INTRAVENOUS at 20:54

## 2025-04-27 RX ADMIN — Medication 250 MG: at 08:56

## 2025-04-27 RX ADMIN — BUPROPION HYDROCHLORIDE 450 MG: 150 TABLET, EXTENDED RELEASE ORAL at 08:56

## 2025-04-27 RX ADMIN — TRAZODONE HYDROCHLORIDE 50 MG: 50 TABLET ORAL at 20:54

## 2025-04-27 RX ADMIN — SODIUM CHLORIDE 2000 MG: 9 INJECTION, SOLUTION INTRAVENOUS at 12:44

## 2025-04-27 RX ADMIN — AMLODIPINE BESYLATE 2.5 MG: 2.5 TABLET ORAL at 08:56

## 2025-04-27 NOTE — ED PROVIDER NOTES
MD ATTESTATION NOTE    SHARED VISIT: This visit was performed by BOTH a physician and an APC. The substantive portion of the medical decision making was performed by this attesting physician who made or approved the management plan and takes responsibility for patient management. All studies documented in the APC note (if performed) were independently interpreted by me.    The ALBA and I have discussed this patient's history, physical exam, MDM, and treatment plan.  I have reviewed the documentation and personally had a face to face interaction with the patient. The attached note describes my personal findings.      Brandy El is a 64 y.o. female who presents to the ED c/o acute right leg redness that began last night.  This is a recurring issue for her in the past that was affects her right leg.  She is not diabetic.    On exam:  GENERAL: not distressed  HENT: nares patent  EYES: no scleral icterus  CV: regular rhythm, regular rate, 2+ right DP pulse  RESPIRATORY: normal effort  ABDOMEN: soft  MUSCULOSKELETAL: no deformity  NEURO: alert, moves all extremities, follows commands  SKIN: Well-demarcated blanching erythematous patch to the anterior right shin and the dorsum of the right foot    Labs  Recent Results (from the past 24 hours)   Comprehensive Metabolic Panel    Collection Time: 04/26/25  6:55 PM    Specimen: Arm, Right; Blood   Result Value Ref Range    Glucose 101 (H) 65 - 99 mg/dL    BUN 11 8 - 23 mg/dL    Creatinine 0.69 0.57 - 1.00 mg/dL    Sodium 138 136 - 145 mmol/L    Potassium 4.1 3.5 - 5.2 mmol/L    Chloride 102 98 - 107 mmol/L    CO2 26.0 22.0 - 29.0 mmol/L    Calcium 8.8 8.6 - 10.5 mg/dL    Total Protein 7.0 6.0 - 8.5 g/dL    Albumin 4.1 3.5 - 5.2 g/dL    ALT (SGPT) 25 1 - 33 U/L    AST (SGOT) 20 1 - 32 U/L    Alkaline Phosphatase 71 39 - 117 U/L    Total Bilirubin 0.9 0.0 - 1.2 mg/dL    Globulin 2.9 gm/dL    A/G Ratio 1.4 g/dL    BUN/Creatinine Ratio 15.9 7.0 - 25.0    Anion Gap 10.0 5.0 - 15.0  mmol/L    eGFR 98.3 >60.0 mL/min/1.73   Lactic Acid, Plasma    Collection Time: 04/26/25  6:55 PM    Specimen: Arm, Right; Blood   Result Value Ref Range    Lactate 1.0 0.5 - 2.0 mmol/L   CBC Auto Differential    Collection Time: 04/26/25  6:55 PM    Specimen: Arm, Right; Blood   Result Value Ref Range    WBC 6.92 3.40 - 10.80 10*3/mm3    RBC 4.40 3.77 - 5.28 10*6/mm3    Hemoglobin 13.3 12.0 - 15.9 g/dL    Hematocrit 40.0 34.0 - 46.6 %    MCV 90.9 79.0 - 97.0 fL    MCH 30.2 26.6 - 33.0 pg    MCHC 33.3 31.5 - 35.7 g/dL    RDW 13.5 12.3 - 15.4 %    RDW-SD 45.5 37.0 - 54.0 fl    MPV 9.5 6.0 - 12.0 fL    Platelets 175 140 - 450 10*3/mm3    Neutrophil % 87.8 (H) 42.7 - 76.0 %    Lymphocyte % 6.5 (L) 19.6 - 45.3 %    Monocyte % 4.9 (L) 5.0 - 12.0 %    Eosinophil % 0.1 (L) 0.3 - 6.2 %    Basophil % 0.3 0.0 - 1.5 %    Immature Grans % 0.4 0.0 - 0.5 %    Neutrophils, Absolute 6.07 1.70 - 7.00 10*3/mm3    Lymphocytes, Absolute 0.45 (L) 0.70 - 3.10 10*3/mm3    Monocytes, Absolute 0.34 0.10 - 0.90 10*3/mm3    Eosinophils, Absolute 0.01 0.00 - 0.40 10*3/mm3    Basophils, Absolute 0.02 0.00 - 0.20 10*3/mm3    Immature Grans, Absolute 0.03 0.00 - 0.05 10*3/mm3    nRBC 0.0 0.0 - 0.2 /100 WBC   Urinalysis With Culture If Indicated - Urine, Clean Catch    Collection Time: 04/26/25  6:59 PM    Specimen: Urine, Clean Catch   Result Value Ref Range    Color, UA Dark Yellow (A) Yellow, Straw    Appearance, UA Clear Clear    pH, UA <=5.0 5.0 - 8.0    Specific Gravity, UA 1.022 1.005 - 1.030    Glucose, UA Negative Negative    Ketones, UA Trace (A) Negative    Bilirubin, UA Negative Negative    Blood, UA Moderate (2+) (A) Negative    Protein, UA Negative Negative    Leuk Esterase, UA Trace (A) Negative    Nitrite, UA Positive (A) Negative    Urobilinogen, UA 1.0 E.U./dL 0.2 - 1.0 E.U./dL   Urinalysis, Microscopic Only - Urine, Clean Catch    Collection Time: 04/26/25  6:59 PM    Specimen: Urine, Clean Catch   Result Value Ref Range    RBC,  UA 11-20 (A) None Seen, 0-2 /HPF    WBC, UA 3-5 (A) None Seen, 0-2 /HPF    Bacteria, UA None Seen None Seen /HPF    Squamous Epithelial Cells, UA 0-2 None Seen, 0-2 /HPF    Hyaline Casts, UA None Seen None Seen /LPF    Methodology Automated Microscopy        Radiology  No Radiology Exams Resulted Within Past 24 Hours    Medications given in the ED:  Medications   sodium chloride 0.9 % flush 3 mL (has no administration in time range)   sodium chloride 0.9 % flush 3-10 mL (has no administration in time range)   sodium chloride 0.9 % infusion 40 mL (has no administration in time range)   acetaminophen (TYLENOL) tablet 650 mg (has no administration in time range)     Or   acetaminophen (TYLENOL) 160 MG/5ML oral solution 650 mg (has no administration in time range)     Or   acetaminophen (TYLENOL) suppository 650 mg (has no administration in time range)   famotidine (PEPCID) tablet 10 mg (has no administration in time range)   sennosides-docusate (PERICOLACE) 8.6-50 MG per tablet 2 tablet (has no administration in time range)     And   polyethylene glycol (MIRALAX) packet 17 g (has no administration in time range)     And   bisacodyl (DULCOLAX) EC tablet 5 mg (has no administration in time range)     And   bisacodyl (DULCOLAX) suppository 10 mg (has no administration in time range)   ondansetron ODT (ZOFRAN-ODT) disintegrating tablet 4 mg (has no administration in time range)     Or   ondansetron (ZOFRAN) injection 4 mg (has no administration in time range)   melatonin tablet 2.5 mg (has no administration in time range)   enoxaparin sodium (LOVENOX) syringe 40 mg (has no administration in time range)   Potassium Replacement - Follow Nurse / BPA Driven Protocol (has no administration in time range)   Magnesium Low Dose Replacement - Follow Nurse / BPA Driven Protocol (has no administration in time range)   Phosphorus Replacement - Follow Nurse / BPA Driven Protocol (has no administration in time range)   Calcium  Replacement - Follow Nurse / BPA Driven Protocol (has no administration in time range)   amLODIPine (NORVASC) tablet 2.5 mg (has no administration in time range)   atorvastatin (LIPITOR) tablet 20 mg (has no administration in time range)   buPROPion XL (WELLBUTRIN XL) 24 hr tablet 450 mg (has no administration in time range)   cetirizine (zyrTEC) tablet 10 mg (has no administration in time range)   propranolol (INDERAL) tablet 30 mg (has no administration in time range)   saccharomyces boulardii (FLORASTOR) capsule 250 mg (has no administration in time range)   valsartan (DIOVAN) tablet 320 mg (has no administration in time range)   HYDROcodone-acetaminophen (NORCO) 5-325 MG per tablet 1 tablet (has no administration in time range)   phenazopyridine (PYRIDIUM) tablet 200 mg (has no administration in time range)   clonazePAM (KlonoPIN) tablet 1 mg (has no administration in time range)   escitalopram (LEXAPRO) tablet 20 mg (has no administration in time range)   traZODone (DESYREL) tablet 50 mg (has no administration in time range)   ceFAZolin 2000 mg IVPB in 100 mL NS (MBP) (has no administration in time range)   sodium chloride 0.9 % bolus 1,000 mL (1,000 mL Intravenous New Bag 4/26/25 1920)   cefTRIAXone (ROCEPHIN) 2,000 mg in sodium chloride 0.9 % 100 mL MBP (2,000 mg Intravenous New Bag 4/26/25 1921)   ibuprofen (ADVIL,MOTRIN) tablet 600 mg (600 mg Oral Given 4/26/25 1923)       Orders placed during this visit:  Orders Placed This Encounter   Procedures    Blood Culture - Blood,    Blood Culture - Blood,    Comprehensive Metabolic Panel    Lactic Acid, Plasma    CBC Auto Differential    Urinalysis With Culture If Indicated - Urine, Clean Catch    Urinalysis, Microscopic Only - Urine, Clean Catch    CBC (No Diff)    Basic Metabolic Panel    Diet: Regular/House; Fluid Consistency: Thin (IDDSI 0)    Vital Signs    Notify Provider (With Default Parameters)    Activity - Ad Isatu    Up With Assistance    Intake & Output     Weigh Patient    Oral Care    Saline Lock & Maintain IV Access    Code Status and Medical Interventions: CPR (Attempt to Resuscitate); Full Support    LHA (on-call MD unless specified) Details    Inpatient Infectious Diseases Consult    Oxygen Therapy- Nasal Cannula; Titrate 1-6 LPM Per SpO2; 90 - 95%    Insert Peripheral IV    Initiate Observation Status    CBC & Differential       Medical Decision Making:  ED Course as of 04/26/25 2026   Sat Apr 26, 2025 1929 WBC: 6.92 [KA]   1930 Hemoglobin: 13.3 [KA]   1930 Lactate: 1.0 [KA]   1930 Bacteria, UA: None Seen [KA]   1930 Glucose(!): 101 [KA]   1930 Creatinine: 0.69 [KA]   1945 Reassessed the patient and counseled her on all of her imaging results.  CBC CMP lactic acid unremarkable.  Does have rapidly progressing cellulitis of the right lower extremity with fever of 102.  She is agreeable with the plan for admission, she is receiving a dose of IV Rocephin. [KA]   1952 I discussed the patient with Dr. Scanlon, hospitalist.  We discussed patient's history presentation workup and he agrees to admit. [KA]      ED Course User Index  [KA] Clara Thorne PA-C       Clinical Scores:                                       Diagnosis  Final diagnoses:   Cellulitis of right lower extremity   Dysuria          Chad Oden II, MD  04/26/25 2026

## 2025-04-27 NOTE — H&P
[facility] Medicine Services  HISTORY AND PHYSICAL    Patient Name: Brandy El  : 1960  MRN: [id number]  Primary Care Physician: Tiffanie Woodard MD  Date of admission: 2025    Subjective   Subjective   Chief Complaint:  Fever, right foot redness and pain    HPI:  Brandy El is a 64 y.o. female with past medical history of previous right ankle surgery and recurrent episodes of right foot and lower leg cellulitis presents the hospital with 1 day history of intermittent fevers and new streaking red right lower leg and foot cellulitis associated with swelling and mild to moderate pain that is worse with palpation.  Patient notes chronic foot drop in her right foot and previous history of ankle surgery and states she does not believe she has any residual hardware from her previous surgery.  She is also previously been seen by infectious disease for recurrent cellulitis and they had a plan of longer suppressive therapy if she had an additional episode.  In the emergency room she had fever of 102 and is being hospitalized for further observation.  Also note patient recently was seen for dysuria and polyuria and diagnosed with a urinary tract infection on  and started on Macrobid.  Symptoms have improved but still intermittent dysuria.      Review of Systems   No current shortness of breath or cough  No current nausea, vomiting, or diarrhea  No current chest pain or palpitations    All other systems reviewed and are negative.     Personal History     Past Medical History:   Diagnosis Date    Anxiety     ASCUS of cervix with negative high risk HPV 2001    ASCUS of cervix with negative high risk HPV 2005    Colon polyp 2013    Depression     Essential hematuria     Gait disturbance     R foot drop    Health care maintenance 2016    Hyperlipidemia     Hypertension 2020    Irritable bowel syndrome many years ago    Postmenopausal     Vaginal delivery        Past Surgical History:    Procedure Laterality Date    ANKLE SURGERY      BACK SURGERY      COLONOSCOPY  10/22/2013    tics, IH, HP    COLONOSCOPY N/A 12/03/2018    Procedure: COLONOSCOPY TO CECUM AND INTO TERMINAL ILEUM WITH COLD BIOPSIES AND COLD BIOPSY POLYPECTOMY;  Surgeon: Syd Emerson MD;  Location: [facility] ENDOSCOPY;  Service: Gastroenterology    ENDOSCOPY N/A 12/03/2018    Procedure: ESOPHAGOGASTRODUODENOSCOPY WITH COLD BIOPSIES;  Surgeon: Syd Emerson MD;  Location: [facility] ENDOSCOPY;  Service: Gastroenterology    LAPAROSCOPIC TUBAL LIGATION Bilateral     TEAR DUCT SURGERY  06/2017    TUBAL ABDOMINAL LIGATION         Family History: family history includes Breast cancer in her sister; COPD in her brother and mother; Cancer in her brother; Heart disease in her brother and father; No Known Problems in her daughter, maternal aunt, maternal grandmother, paternal aunt, paternal grandmother, and son. Other pertinent FHx was reviewed and unremarkable.     Social History:  reports that she has never smoked. She has never used smokeless tobacco. She reports that she does not currently use alcohol after a past usage of about 2.0 standard drinks of alcohol per week. She reports that she does not use drugs.  Social History     Social History Narrative    Not on file       Medications:  Available home medication information reviewed.    Allergies   Allergen Reactions    Phenergan [Promethazine Hcl] Anxiety       Objective   Objective   Vital Signs:   Temp:  [102 °F (38.9 °C)] 102 °F (38.9 °C)  Heart Rate:  [79] 79  Resp:  [16] 16  BP: (151)/(75) 151/75        Physical Exam   Constitutional: Awake, alert  HENT: NCAT, mucous membranes moist, neck supple  Respiratory: No cough or wheezes, normal respirations, nonlabored breathing   Cardiovascular: Pulse rate is normal, palpable radial pulses  Gastrointestinal:  Soft, nontender, nondistended  Musculoskeletal: Normal musculature for age, as per below otherwise right foot with mild edema, no  significant left foot edema BMI 30, mildly obese  Psychiatric: Appropriate affect, cooperative, conversational  Neurologic: No slurred speech or facial droop, follows commands  Skin: Right foot is swollen with streaking erythema on the foot and ankle and lower tibial plateau region with some tenderness and chronic intermittent numbness reported, no jaundice, no rash      Results from last 7 days   Lab Units 04/26/25  1855   WBC 10*3/mm3 6.92   HEMOGLOBIN g/dL 13.3   HEMATOCRIT % 40.0   PLATELETS 10*3/mm3 175     Results from last 7 days   Lab Units 04/26/25  1855   SODIUM mmol/L 138   POTASSIUM mmol/L 4.1   CHLORIDE mmol/L 102   CO2 mmol/L 26.0   BUN mg/dL 11   CREATININE mg/dL 0.69   GLUCOSE mg/dL 101*   CALCIUM mg/dL 8.8   ALK PHOS U/L 71   ALT (SGPT) U/L 25   AST (SGOT) U/L 20   LACTATE mmol/L 1.0     Estimated Creatinine Clearance: 83.1 mL/min (by C-G formula based on SCr of 0.69 mg/dL).  Brief Urine Lab Results  (Last result in the past 365 days)        Color   Clarity   Blood   Leuk Est   Nitrite   Protein   CREAT   Urine HCG        04/26/25 1859 Dark Yellow   Clear   Moderate (2+)   Trace   Positive   Negative                 Imaging Results (Last 24 Hours)       ** No results found for the last 24 hours. **          Results for orders placed in visit on 06/16/15    SCANNED - ECHOCARDIOGRAM      Assessment & Plan   Assessment & Plan     Active Hospital Problems    Diagnosis  POA    **Cellulitis of right lower extremity [L03.115]  Yes    Fever [R50.9]  Yes    Hypertension, essential [I10]  Yes    Recurrent cellulitis of lower leg [L03.119]  Yes    History of ankle surgery [Z98.890]  Not Applicable    Right foot drop [M21.371]  Yes    Neuropathy of the right foot [G62.9]  Yes    Anxiety [F41.9]  Yes    Hyperlipidemia [E78.5]  Yes     64-year-old female with history of right ankle surgery with foot drop and recurrent right lower leg cellulitis presents the hospital with fever and a new episode of right lower  extremity cellulitis.    Right lower extremity recurrent cellulitis, fever  History of ankle surgery with chronic foot drop and chronic right foot neuropathy  Patient responded previously to Keflex several months ago.  Plan to start on Ancef while hospitalized.  She was recently seen outpatient by infectious disease who had a plan for more long-term suppressive treatment if she had another episode.  Plan to check x-ray of the ankle to evaluate if she has any residual hardware in the foot from her previous ankle surgery.  Supportive care and symptom treatment.  Tylenol as needed for fevers.    Essential hypertension:   Continue appropriate home blood pressure medications.  Monitor blood pressure adjust as needed.    Hyperlipidemia:   Continue statin.  Stable.    Depression/anxiety:   Continue home medications.  Mood currently stable.  Monitor    Insomnia:   Continue home trazodone.  Add melatonin while hospitalized due to recent difficulty sleeping.    Treatment plan discussed with patient is in agreement    DVT prophylaxis: Lovenox    CODE STATUS:    Code Status and Medical Interventions: CPR (Attempt to Resuscitate); Full Support   Ordered at: 04/26/25 2000     Code Status (Patient has no pulse and is not breathing):    CPR (Attempt to Resuscitate)     Medical Interventions (Patient has pulse or is breathing):    Full Support     Level Of Support Discussed With:    Patient         Kareem Scanlon MD  04/26/25

## 2025-04-27 NOTE — PLAN OF CARE
Goal Outcome Evaluation:  Plan of Care Reviewed With: patient        Progress: improving  Outcome Evaluation: Pt is A&OX4, calm and cooperative. Pt con of B&B, loose BM today. Pt up ad gene. Meds whole with water. Redness to RLE, improving, continue with swelling and mild soreness. ID following pt. IV ABX given per order. Pt able to make needs known, call light within reach.

## 2025-04-27 NOTE — ED NOTES
Nursing report ED to floor  Brandy El  64 y.o.  female    HPI : have rapidly progressing cellulitis of the right lower extremity with fever of 102. She is agreeable with the plan for admission, she is receiving a dose of IV Rocephin   HPI  Stated Reason for Visit: fever, R leg cellulitis  History Obtained From: patient    Chief Complaint  Chief Complaint   Patient presents with    Fever    Wound Check       Admitting doctor:   Kareem Scanlon MD    Admitting diagnosis:   The primary encounter diagnosis was Cellulitis of right lower extremity. A diagnosis of Dysuria was also pertinent to this visit.    Code status:   Current Code Status       Date Active Code Status Order ID Comments User Context       4/26/2025 2000 CPR (Attempt to Resuscitate) [id number]  Kareem Scanlon MD ED        Question Answer    Code Status (Patient has no pulse and is not breathing) CPR (Attempt to Resuscitate)    Medical Interventions (Patient has pulse or is breathing) Full Support    Level Of Support Discussed With Patient                    Allergies:   Phenergan [promethazine hcl]    Isolation:   No active isolations    Intake and Output  No intake or output data in the 24 hours ending 04/26/25 2003    Weight:   There were no vitals filed for this visit.    Most recent vitals:   Vitals:    04/26/25 1756   BP: 151/75   Pulse: 79   Resp: 16   Temp: (!) 102 °F (38.9 °C)   TempSrc: Tympanic   SpO2: 92%       Active LDAs/IV Access:   Lines, Drains & Airways       Active LDAs       Name Placement date Placement time Site Days    Peripheral IV 04/26/25 1920 20 G Right Antecubital 04/26/25 1920  Antecubital  less than 1                    Labs (abnormal labs have a star):   Labs Reviewed   COMPREHENSIVE METABOLIC PANEL - Abnormal; Notable for the following components:       Result Value    Glucose 101 (*)     All other components within normal limits    Narrative:     GFR Categories in Chronic Kidney Disease (CKD)      GFR  Category          GFR (mL/min/1.73)    Interpretation  G1                     90 or greater         Normal or high (1)  G2                      60-89                Mild decrease (1)  G3a                   45-59                Mild to moderate decrease  G3b                   30-44                Moderate to severe decrease  G4                    15-29                Severe decrease  G5                    14 or less           Kidney failure          (1)In the absence of evidence of kidney disease, neither GFR category G1 or G2 fulfill the criteria for CKD.    eGFR calculation 2021 CKD-EPI creatinine equation, which does not include race as a factor   CBC WITH AUTO DIFFERENTIAL - Abnormal; Notable for the following components:    Neutrophil % 87.8 (*)     Lymphocyte % 6.5 (*)     Monocyte % 4.9 (*)     Eosinophil % 0.1 (*)     Lymphocytes, Absolute 0.45 (*)     All other components within normal limits   URINALYSIS W/ CULTURE IF INDICATED - Abnormal; Notable for the following components:    Color, UA Dark Yellow (*)     Ketones, UA Trace (*)     Blood, UA Moderate (2+) (*)     Leuk Esterase, UA Trace (*)     Nitrite, UA Positive (*)     All other components within normal limits    Narrative:     In absence of clinical symptoms, the presence of pyuria, bacteria, and/or nitrites on the urinalysis result does not correlate with infection.   URINALYSIS, MICROSCOPIC ONLY - Abnormal; Notable for the following components:    RBC, UA 11-20 (*)     WBC, UA 3-5 (*)     All other components within normal limits   LACTIC ACID, PLASMA - Normal   BLOOD CULTURE   BLOOD CULTURE   CBC AND DIFFERENTIAL    Narrative:     The following orders were created for panel order CBC & Differential.  Procedure                               Abnormality         Status                     ---------                               -----------         ------                     CBC Auto Differential[[id number]]        Abnormal            Final result                  Please view results for these tests on the individual orders.       EKG:   No orders to display       Meds given in ED:   Medications   sodium chloride 0.9 % flush 3 mL (has no administration in time range)   sodium chloride 0.9 % flush 3-10 mL (has no administration in time range)   sodium chloride 0.9 % infusion 40 mL (has no administration in time range)   acetaminophen (TYLENOL) tablet 650 mg (has no administration in time range)     Or   acetaminophen (TYLENOL) 160 MG/5ML oral solution 650 mg (has no administration in time range)     Or   acetaminophen (TYLENOL) suppository 650 mg (has no administration in time range)   famotidine (PEPCID) tablet 10 mg (has no administration in time range)   sennosides-docusate (PERICOLACE) 8.6-50 MG per tablet 2 tablet (has no administration in time range)     And   polyethylene glycol (MIRALAX) packet 17 g (has no administration in time range)     And   bisacodyl (DULCOLAX) EC tablet 5 mg (has no administration in time range)     And   bisacodyl (DULCOLAX) suppository 10 mg (has no administration in time range)   ondansetron ODT (ZOFRAN-ODT) disintegrating tablet 4 mg (has no administration in time range)     Or   ondansetron (ZOFRAN) injection 4 mg (has no administration in time range)   melatonin tablet 2.5 mg (has no administration in time range)   enoxaparin sodium (LOVENOX) syringe 40 mg (has no administration in time range)   Potassium Replacement - Follow Nurse / BPA Driven Protocol (has no administration in time range)   Magnesium Low Dose Replacement - Follow Nurse / BPA Driven Protocol (has no administration in time range)   Phosphorus Replacement - Follow Nurse / BPA Driven Protocol (has no administration in time range)   Calcium Replacement - Follow Nurse / BPA Driven Protocol (has no administration in time range)   sodium chloride 0.9 % bolus 1,000 mL (1,000 mL Intravenous New Bag 4/26/25 1920)   cefTRIAXone (ROCEPHIN) 2,000 mg in sodium chloride 0.9  % 100 mL MBP (2,000 mg Intravenous New Bag 4/26/25 1921)   ibuprofen (ADVIL,MOTRIN) tablet 600 mg (600 mg Oral Given 4/26/25 1923)       Imaging results:  No radiology results for the last day    Ambulatory status:   - ad gene    Social issues:   Social History     Socioeconomic History    Marital status:    Tobacco Use    Smoking status: Never    Smokeless tobacco: Never   Vaping Use    Vaping status: Never Used   Substance and Sexual Activity    Alcohol use: Not Currently     Alcohol/week: 2.0 standard drinks of alcohol     Types: 2 Shots of liquor per week     Comment: 2 x weekly    Drug use: No    Sexual activity: Not Currently     Partners: Male     Birth control/protection: Post-menopausal       Peripheral Neurovascular  Peripheral Neurovascular (Adult)  Peripheral Neurovascular WDL: WDL    Neuro Cognitive  Neuro Cognitive (Adult)  Cognitive/Neuro/Behavioral WDL: WDL    Learning       Respiratory  Respiratory WDL  Respiratory WDL: WDL    Abdominal Pain       Pain Assessments  Pain (Adult)  (0-10) Pain Rating: Rest: 8    NIH Stroke Scale       Rickey Miranda RN  04/26/25 20:03 EDT

## 2025-04-27 NOTE — PROGRESS NOTES
Name: Brandy El ADMIT: 2025   : 1960  PCP: Tiffanie Woodard MD    MRN: [id number] LOS: 0 days   AGE/SEX: 64 y.o. female  ROOM: [facility]     Subjective   Subjective   No new complaints.  Not having any pain in her leg.       Objective   Objective   Vital Signs  Temp:  [98.1 °F (36.7 °C)-102 °F (38.9 °C)] 98.4 °F (36.9 °C)  Heart Rate:  [56-79] 64  Resp:  [16-18] 18  BP: ()/(44-75) 122/63  SpO2:  [92 %-95 %] 95 %  on   ;   Device (Oxygen Therapy): room air  Body mass index is 31.8 kg/m².  Physical Exam  Vitals and nursing note reviewed.   Constitutional:       General: She is not in acute distress.  Cardiovascular:      Rate and Rhythm: Normal rate and regular rhythm.   Pulmonary:      Effort: Pulmonary effort is normal.      Breath sounds: Normal breath sounds.   Abdominal:      General: Bowel sounds are normal.      Palpations: Abdomen is soft.      Tenderness: There is no abdominal tenderness.   Musculoskeletal:         General: No swelling.   Skin:     General: Skin is warm and dry.      Findings: Erythema present.      Comments: RLE mildly erythematous but seems improved compared to lines drawn on admission.   Neurological:      Mental Status: She is alert. Mental status is at baseline.       Results Review     I reviewed the patient's new clinical results.  Results from last 7 days   Lab Units 25  0502 25  1855   WBC 10*3/mm3 4.26 6.92   HEMOGLOBIN g/dL 11.6* 13.3   PLATELETS 10*3/mm3 149 175     Results from last 7 days   Lab Units 25  0502 25  1855   SODIUM mmol/L 141 138   POTASSIUM mmol/L 3.6 4.1   CHLORIDE mmol/L 107 102   CO2 mmol/L 26.0 26.0   BUN mg/dL 10 11   CREATININE mg/dL 0.57 0.69   GLUCOSE mg/dL 101* 101*   EGFR mL/min/1.73 102.9 98.3     Results from last 7 days   Lab Units 25  1855   ALBUMIN g/dL 4.1   BILIRUBIN mg/dL 0.9   ALK PHOS U/L 71   AST (SGOT) U/L 20   ALT (SGPT) U/L 25     Results from last 7 days   Lab Units 25  0279  "04/26/25  1855   CALCIUM mg/dL 8.3* 8.8   ALBUMIN g/dL  --  4.1     Results from last 7 days   Lab Units 04/26/25  1855   LACTATE mmol/L 1.0     No results found for: \"HGBA1C\", \"POCGLU\"    XR Ankle 2 View Right  Result Date: 4/27/2025  No retained hardware is identified.  This report was finalized on 4/27/2025 4:54 AM by Dr. Fadia Ribera M.D on Workstation: [id number]        I have personally reviewed all medications:  Scheduled Medications  amLODIPine, 2.5 mg, Oral, Daily  atorvastatin, 20 mg, Oral, Daily  buPROPion XL, 450 mg, Oral, QAM  ceFAZolin, 2,000 mg, Intravenous, Q8H  enoxaparin sodium, 40 mg, Subcutaneous, Daily  escitalopram, 20 mg, Oral, Nightly  melatonin, 2.5 mg, Oral, Nightly  potassium chloride ER, 40 mEq, Oral, Q4H  propranolol, 30 mg, Oral, Q12H  saccharomyces boulardii, 250 mg, Oral, BID  sodium chloride, 3 mL, Intravenous, Q12H  traZODone, 50 mg, Oral, Nightly  valsartan, 320 mg, Oral, Daily    Infusions   Diet  Diet: Regular/House; Fluid Consistency: Thin (IDDSI 0)    I have personally reviewed:  [x]  Laboratory   [x]  Microbiology   [x]  Radiology   [x]  EKG/Telemetry  [x]  Cardiology/Vascular   []  Pathology    []  Records       Assessment/Plan     Active Hospital Problems    Diagnosis  POA    **Cellulitis of right lower extremity [L03.115]  Yes    Fever [R50.9]  Yes    Hypertension, essential [I10]  Yes    Recurrent cellulitis of lower leg [L03.119]  Yes    History of ankle surgery [Z98.890]  Not Applicable    Right foot drop [M21.371]  Yes    Neuropathy of the right foot [G62.9]  Yes    Anxiety [F41.9]  Yes    Hyperlipidemia [E78.5]  Yes      Resolved Hospital Problems   No resolved problems to display.       64-year-old female with history of right ankle surgery with foot drop and recurrent RLE cellulitis presented with fever and a new episode of right lower extremity cellulitis.     Afebrile since ER last evening  /63 improved from 84/44  Labs unremarkable including normal " WBC  Blood cultures pending      On cefazolin 2000 mg IV every 8 hours  X-ray right ankle without residual hardware    Seems to have responded well to IV cefazolin.  ID consulted as they have seen her in the past for this recurrent cellulitis.  Patient feels well enough to discharge home and does seem to have good improvement in her leg thus far.  Will await ID recommendations.       Lovenox 40 mg SC daily for DVT prophylaxis.  Full code.  Discussed with patient.  Anticipate discharge home when cleared by consultants.  Expected Discharge Date: 4/28/2025; Expected Discharge Time:       Jluis Amador MD  [facility]  04/27/25  11:28 EDT

## 2025-04-27 NOTE — CONSULTS
Referring Provider: Chad Oden II, MD  [address] [place] [address]  Reason for Consultation: Concern for recurrent cellulitis of the right foot    Subjective   History of present illness: This is a 64-year-old female with a history of anxiety/depression hyperlipidemia and hypertension who was admitted on April 26 with concerns for recurrent right foot cellulitis.  The patient was seen in the infectious disease clinic on August 2020 for with complaints of recurrent right lower extremity cellulitis.  At that time she had 2 prior episodes.  The day prior to admission the patient developed fever with erythema on her right lower leg and foot.  She also reported swelling and pain with palpation.  In the emergency room she was found to be febrile to 102.  Admission labs revealed a normal white blood cell count.  X-ray of the right ankle was unremarkable.  She was empirically started on cefazolin    Past Medical History:   Diagnosis Date    Anxiety     Depression     Gait disturbance     R foot drop    Hyperlipidemia     Hypertension 2020    Irritable bowel syndrome many years ago       Past Surgical History:   Procedure Laterality Date    ANKLE SURGERY      BACK SURGERY      LAPAROSCOPIC TUBAL LIGATION Bilateral     TEAR DUCT SURGERY  06/2017    TUBAL ABDOMINAL LIGATION          reports that she has never smoked. She has never used smokeless tobacco. She reports that she does not currently use alcohol after a past usage of about 2.0 standard drinks of alcohol per week. She reports that she does not use drugs.    family history includes Breast cancer in her sister; COPD in her brother and mother; Cancer in her brother; Heart disease in her brother and father; No Known Problems in her daughter, maternal aunt, maternal grandmother, paternal aunt, paternal grandmother, and son.    Allergies   Allergen Reactions    Phenergan [Promethazine Hcl] Anxiety       Medication:  Antibiotics:  Cefazolin 2 g IV every  8 hours    Please refer to the medical record for a full medication list    Review of Systems  Pertinent items are noted in HPI, all other systems reviewed and negative    Objective   Vital Signs   Temp:  [98.1 °F (36.7 °C)-102 °F (38.9 °C)] 98.4 °F (36.9 °C)  Heart Rate:  [56-79] 64  Resp:  [16-18] 18  BP: ()/(44-75) 122/63    Physical Exam:   General: In no acute distress  HEENT: Normocephalic, atraumatic, no scleral icterus.   Neck: Supple, trachea is midline  Cardiovascular: Normal rate, regular rhythm  Respiratory: Breathing comfortably on room air  GI: Abdomen is soft, nontender, nondistended  Musculoskeletal: Right foot drop, right foot swelling with mild erythema significantly decreased as areas demarcated prior  Skin: No rashes  Extremities: No E/C/C  Neurological: Alert and oriented, moving all 4 extremities  Psychiatric: Normal mood and affect     Results Review:   I reviewed the patient's new clinical results.  I reviewed the patient's new imaging results and agree with the interpretation.    Lab Results   Component Value Date    WBC 4.26 04/27/2025    HGB 11.6 (L) 04/27/2025    HCT 34.6 04/27/2025    MCV 89.6 04/27/2025     04/27/2025       Lab Results   Component Value Date    GLUCOSE 101 (H) 04/27/2025    BUN 10 04/27/2025    CREATININE 0.57 04/27/2025    EGFRIFNONA 77 08/26/2021    EGFRIFAFRI 93 08/26/2021    BCR 17.5 04/27/2025    CO2 26.0 04/27/2025    CALCIUM 8.3 (L) 04/27/2025    ALBUMIN 4.1 04/26/2025    AST 20 04/26/2025    ALT 25 04/26/2025       Microbiology:  4/26 BCx P x 2  4/24 UCx neg    Radiology:  X-ray of the right ankle shows no fracture calcaneal spurring.  Degenerative changes.  No residual hardware identified    Assessment & Plan   Fever  History of recurrent right foot cellulitis now with recurrent cellulitis    Patient has had clinical improvement with decreased erythema.  Continue cefazolin 2 g IV every 8 hours.  Given the presence of fever I would like to rule out  bacteremia.  So far blood cultures are negative to date.  Antibiotic choice and duration to be determined tomorrow    The patient does not have a history of a resistant bacterial infection therefore standard precautions suffice    I discussed the patient's findings and my recommendations with patient, nursing staff, and consulting provider

## 2025-04-28 ENCOUNTER — READMISSION MANAGEMENT (OUTPATIENT)
Dept: CALL CENTER | Facility: HOSPITAL | Age: 65
End: 2025-04-28
Payer: COMMERCIAL

## 2025-04-28 VITALS
HEART RATE: 50 BPM | HEIGHT: 63 IN | BODY MASS INDEX: 31.8 KG/M2 | WEIGHT: 179.5 LBS | OXYGEN SATURATION: 98 % | TEMPERATURE: 98.1 F | DIASTOLIC BLOOD PRESSURE: 55 MMHG | SYSTOLIC BLOOD PRESSURE: 126 MMHG | RESPIRATION RATE: 18 BRPM

## 2025-04-28 PROCEDURE — G0378 HOSPITAL OBSERVATION PER HR: HCPCS

## 2025-04-28 PROCEDURE — 96372 THER/PROPH/DIAG INJ SC/IM: CPT

## 2025-04-28 PROCEDURE — 99214 OFFICE O/P EST MOD 30 MIN: CPT | Performed by: INTERNAL MEDICINE

## 2025-04-28 PROCEDURE — 96366 THER/PROPH/DIAG IV INF ADDON: CPT

## 2025-04-28 PROCEDURE — 25010000002 CEFAZOLIN PER 500 MG: Performed by: INTERNAL MEDICINE

## 2025-04-28 PROCEDURE — 25010000002 ENOXAPARIN PER 10 MG: Performed by: INTERNAL MEDICINE

## 2025-04-28 RX ORDER — VALSARTAN 320 MG/1
160 TABLET ORAL EVERY MORNING
Start: 2025-04-28

## 2025-04-28 RX ORDER — CEPHALEXIN 500 MG/1
1000 CAPSULE ORAL EVERY 8 HOURS SCHEDULED
Status: DISCONTINUED | OUTPATIENT
Start: 2025-04-28 | End: 2025-04-28 | Stop reason: HOSPADM

## 2025-04-28 RX ORDER — CEPHALEXIN 500 MG/1
1000 CAPSULE ORAL EVERY 8 HOURS SCHEDULED
Qty: 42 CAPSULE | Refills: 0 | Status: SHIPPED | OUTPATIENT
Start: 2025-04-28 | End: 2025-05-05

## 2025-04-28 RX ADMIN — ATORVASTATIN CALCIUM 20 MG: 20 TABLET, FILM COATED ORAL at 08:20

## 2025-04-28 RX ADMIN — Medication 3 ML: at 10:08

## 2025-04-28 RX ADMIN — BUPROPION HYDROCHLORIDE 450 MG: 150 TABLET, EXTENDED RELEASE ORAL at 08:20

## 2025-04-28 RX ADMIN — SODIUM CHLORIDE 2000 MG: 9 INJECTION, SOLUTION INTRAVENOUS at 05:11

## 2025-04-28 RX ADMIN — Medication 250 MG: at 08:19

## 2025-04-28 RX ADMIN — ENOXAPARIN SODIUM 40 MG: 100 INJECTION SUBCUTANEOUS at 08:19

## 2025-04-28 NOTE — CASE MANAGEMENT/SOCIAL WORK
Case Management Discharge Note                Selected Continued Care - Discharged on 4/28/2025 Admission date: 4/26/2025 - Discharge disposition: Home or Self Care      Destination    No services have been selected for the patient.                Durable Medical Equipment    No services have been selected for the patient.                Dialysis/Infusion    No services have been selected for the patient.                Home Medical Care    No services have been selected for the patient.                Therapy    No services have been selected for the patient.                Community Resources    No services have been selected for the patient.                Community & DME    No services have been selected for the patient.                         Final Discharge Disposition Code: 01 - home or self-care

## 2025-04-28 NOTE — PLAN OF CARE
Goal Outcome Evaluation:  Plan of Care Reviewed With: patient        Progress: improving  Outcome Evaluation: Pt is to d/c home this shift. Pt AOx4, up ad gene, improving redness on RLE

## 2025-04-28 NOTE — PLAN OF CARE
Goal Outcome Evaluation:                 Problem: Adult Inpatient Plan of Care  Goal: Plan of Care Review  Outcome: Progressing        Cellulitis noticeably diminished from prior shift assessment

## 2025-04-28 NOTE — PROGRESS NOTES
ID NOTE    CC: f/u RLE cellulitis    Subj: No acute events.  No fever.  She says the erythema on her right leg and foot are much better today.  She is very eager for discharge.  She is tolerating cefazolin without any rash or diarrhea.      Medications:    Current Facility-Administered Medications:     acetaminophen (TYLENOL) tablet 650 mg, 650 mg, Oral, Q4H PRN **OR** acetaminophen (TYLENOL) 160 MG/5ML oral solution 650 mg, 650 mg, Oral, Q4H PRN **OR** acetaminophen (TYLENOL) suppository 650 mg, 650 mg, Rectal, Q4H PRN, Kareem Scanlon MD    amLODIPine (NORVASC) tablet 2.5 mg, 2.5 mg, Oral, Daily, Kareem Scanlon MD, 2.5 mg at 04/27/25 0856    atorvastatin (LIPITOR) tablet 20 mg, 20 mg, Oral, Daily, Kareem Scanlon MD, 20 mg at 04/28/25 0820    sennosides-docusate (PERICOLACE) 8.6-50 MG per tablet 2 tablet, 2 tablet, Oral, BID PRN **AND** polyethylene glycol (MIRALAX) packet 17 g, 17 g, Oral, Daily PRN **AND** bisacodyl (DULCOLAX) EC tablet 5 mg, 5 mg, Oral, Daily PRN **AND** bisacodyl (DULCOLAX) suppository 10 mg, 10 mg, Rectal, Daily PRN, Kareem Scanlon MD    buPROPion XL (WELLBUTRIN XL) 24 hr tablet 450 mg, 450 mg, Oral, QAM, Kareem Scanlon MD, 450 mg at 04/28/25 0820    Calcium Replacement - Follow Nurse / BPA Driven Protocol, , Not Applicable, PRN, Kareem Scanlon MD    ceFAZolin 2000 mg IVPB in 100 mL NS (MBP), 2,000 mg, Intravenous, Q8H, Kareem Scanlon MD, Stopped at 04/28/25 0541    cetirizine (zyrTEC) tablet 10 mg, 10 mg, Oral, Daily PRN, Kareem Scanlon MD    clonazePAM (KlonoPIN) tablet 1 mg, 1 mg, Oral, BID PRN, Kareem Scanlon MD    enoxaparin sodium (LOVENOX) syringe 40 mg, 40 mg, Subcutaneous, Daily, Kareem Scanlon MD, 40 mg at 04/28/25 0819    escitalopram (LEXAPRO) tablet 20 mg, 20 mg, Oral, Nightly, Kareem Scanlon MD, 20 mg at 04/27/25 2054    famotidine (PEPCID) tablet 10 mg, 10 mg, Oral, BID  PRNCapo Stephen Matthew, MD    HYDROcodone-acetaminophen (NORCO) 5-325 MG per tablet 1 tablet, 1 tablet, Oral, Q6H PRN, Kareem Scanlon MD    Magnesium Low Dose Replacement - Follow Nurse / BPA Driven Protocol, , Not Applicable, [place] Stephen Matthew, MD    melatonin tablet 2.5 mg, 2.5 mg, Oral, Nightly, Kareem Scanlon MD, 2.5 mg at 04/27/25 2054    ondansetron ODT (ZOFRAN-ODT) disintegrating tablet 4 mg, 4 mg, Oral, Q6H PRN **OR** ondansetron (ZOFRAN) injection 4 mg, 4 mg, Intravenous, Q6H PRN, Kareem Scanlon MD, 4 mg at 04/27/25 0900    phenazopyridine (PYRIDIUM) tablet 200 mg, 200 mg, Oral, TID PRN, Kareem Scanlon MD    Phosphorus Replacement - Follow Nurse / BPA Driven Protocol, , Not Applicable, PRN, Kareem Scanlon MD    Potassium Replacement - Follow Nurse / BPA Driven Protocol, , Not Applicable, PRN, Kareem Scanlon MD    propranolol (INDERAL) tablet 30 mg, 30 mg, Oral, Q12H, Kareem Scanlon MD, 30 mg at 04/27/25 2054    saccharomyces boulardii (FLORASTOR) capsule 250 mg, 250 mg, Oral, BID, Kareem Scanlon MD, 250 mg at 04/28/25 0819    sodium chloride 0.9 % flush 3 mL, 3 mL, Intravenous, Q12H, Kareem Scanlon MD, 3 mL at 04/27/25 0857    sodium chloride 0.9 % flush 3-10 mL, 3-10 mL, Intravenous, PRN, Kareem Scanlon MD    sodium chloride 0.9 % infusion 40 mL, 40 mL, Intravenous, PRN, Kareem Scanlon MD    traZODone (DESYREL) tablet 50 mg, 50 mg, Oral, Nightly, Kareem Scanlon MD, 50 mg at 04/27/25 2054    valsartan (DIOVAN) tablet 320 mg, 320 mg, Oral, Daily, Kareem Scanlon MD, 320 mg at 04/27/25 0856    Objective   Vital Signs   Temp:  [97.9 °F (36.6 °C)-98.8 °F (37.1 °C)] 98.1 °F (36.7 °C)  Heart Rate:  [50-60] 50  Resp:  [18] 18  BP: (115-133)/(54-79) 126/55    Physical Exam:   General: Awake, alert, very nice, sitting up in bed  Eyes: No scleral icterus.   Cardiovascular:  Normal rate  Respiratory: Breathing comfortably on room air without wheezing  GI: Abdomen is soft, nontender, nondistended  Musculoskeletal: Right foot swelling but erythema essentially resolved  Neurological: Alert and oriented  Psychiatric: Normal mood and affect     Results Review:  CBC, BMP, and blood and urine cultures reviewed today    Lab Results   Component Value Date    WBC 4.26 04/27/2025    HGB 11.6 (L) 04/27/2025    HCT 34.6 04/27/2025    MCV 89.6 04/27/2025     04/27/2025       Lab Results   Component Value Date    GLUCOSE 101 (H) 04/27/2025    BUN 10 04/27/2025    CREATININE 0.57 04/27/2025    EGFRIFNONA 77 08/26/2021    EGFRIFAFRI 93 08/26/2021    BCR 17.5 04/27/2025    CO2 26.0 04/27/2025    CALCIUM 8.3 (L) 04/27/2025    ALBUMIN 4.1 04/26/2025    AST 20 04/26/2025    ALT 25 04/26/2025       Microbiology:  4/26 BCx NGTD  4/24 UCx neg    New radiology:  X-ray of the right ankle shows no fracture calcaneal spurring.  Degenerative changes.  No residual hardware identified    Assessment & Plan   #1 RLE cellulitis  #2 fever in adult  #3 obesity BMI 31    This is my first day seeing her foot and leg but there is essentially no erythema remaining at this point.  The patient is pleased with her progress.  Her blood cultures are negative.  I discussed the case with Dr. Amador her hospitalist.  The patient is ready for discharge.  Change cefazolin 2 g IV every 8 hours to cephalexin 1 g p.o. every 8 hours x 7 additional days.    I will schedule her to see me in my office in the coming weeks to discuss long-term prophylactic penicillin VK.    No need to take any further doses of nitrofurantoin.  Urine culture was essentially negative.    Thank you for allowing me to be involved in the care of this patient. Infectious diseases will sign off at this time with antibiotics plan in place, but please call me at [phone] if any further ID questions or new ID concerns.

## 2025-04-28 NOTE — OUTREACH NOTE
Prep Survey      Flowsheet Row Responses   [facility] patient discharged from? [place]   Is LACE score < 7 ? Yes   Eligibility [facility]   Date of Admission 04/26/25   Date of Discharge 04/28/25   Discharge Disposition Home or Self Care   Discharge diagnosis Cellulitis of right lower extremity   Does the patient have one of the following disease processes/diagnoses(primary or secondary)? Other   Does the patient have Home health ordered? No   Is there a DME ordered? No   Prep survey completed? Yes            Laura MARISCAL - Registered Nurse

## 2025-04-28 NOTE — DISCHARGE SUMMARY
Patient Name: Brandy El  : 1960  MRN: [id number]    Date of Admission: 2025  Date of Discharge:  2025  Primary Care Physician: Tiffanie Woodard MD      Chief Complaint:   Fever and Wound Check      Discharge Diagnoses     Active Hospital Problems    Diagnosis  POA    **Cellulitis of right lower extremity [L03.115]  Yes    Fever [R50.9]  Yes    Hypertension, essential [I10]  Yes    Recurrent cellulitis of lower leg [L03.119]  Yes    History of ankle surgery [Z98.890]  Not Applicable    Right foot drop [M21.371]  Yes    Neuropathy of the right foot [G62.9]  Yes    Anxiety [F41.9]  Yes    Hyperlipidemia [E78.5]  Yes      Resolved Hospital Problems   No resolved problems to display.        Hospital Course     Ms. El is a 64 y.o. female with a history of previous right ankle surgery and recurrent episodes of right foot and lower leg cellulitis who presented to [facility] initially complaining of swelling and redness right ankle.  Please see the admitting history and physical for further details.  She was found to have cellulitis and was admitted to the hospital for further evaluation and treatment.  She was treated with IV cefazolin and has had very good response to treatment.  Infectious disease was consulted due to recurrent nature of the cellulitis.  Previously been followed by Dr. Murry.  Her cellulitis seems essentially resolved.  She did have a urine culture on admission that was negative.  She had been on Macrobid for recently diagnosed UTI at an urgent care facility.  She was having urinary symptoms at that time but review of urine culture was negative.  Plan will be to discharge her home today with a course of Keflex to treat current cellulitis.  She will follow-up Dr. Vila in his office to discuss possible further prophylactic measures.      Day of Discharge     Subjective:  Feels much better and well enough to go home.    Physical Exam:  Temp:  [97.9 °F (36.6  °C)-98.8 °F (37.1 °C)] 98.1 °F (36.7 °C)  Heart Rate:  [50-60] 50  Resp:  [18] 18  BP: (115-133)/(54-79) 126/55  Body mass index is 31.8 kg/m².  Physical Exam  Vitals and nursing note reviewed.   Constitutional:       General: She is not in acute distress.  Cardiovascular:      Rate and Rhythm: Normal rate and regular rhythm.   Pulmonary:      Effort: Pulmonary effort is normal.      Breath sounds: Normal breath sounds.   Abdominal:      General: Bowel sounds are normal.      Palpations: Abdomen is soft.      Tenderness: There is no abdominal tenderness.   Musculoskeletal:         General: No swelling.   Skin:     General: Skin is warm and dry.      Findings: Erythema present.      Comments: RLE mildly erythematous but dramatically improved compared to admission   Neurological:      Mental Status: She is alert. Mental status is at baseline.         Consultants     Consult Orders (all) (From admission, onward)       Start     Ordered    04/27/25 0702  Inpatient Infectious Diseases Consult  IN AM        Specialty:  Infectious Diseases  Provider:  Stefano Murry MD    04/26/25 2008            Procedures     Imaging Results (All)       Procedure Component Value Units Date/Time    XR Ankle 2 View Right [[id number]] Collected: 04/27/25 0453     Updated: 04/27/25 0457    Narrative:      2 VIEWS RIGHT ANKLE     HISTORY: Pain and swelling     COMPARISON: None available.     FINDINGS:  No acute fracture or subluxation of the right ankle is seen. No residual  hardware is identified. There are chronic degenerative changes of the  right ankle. There is some right lower extremity edema. There is  calcaneal spurring. There are degenerative changes of the midfoot.       Impression:      No retained hardware is identified.         Pertinent Labs     Results from last 7 days   Lab Units 04/27/25  0502 04/26/25  1855   WBC 10*3/mm3 4.26 6.92   HEMOGLOBIN g/dL 11.6* 13.3   PLATELETS 10*3/mm3 149 175     Results from last 7  "days   Lab Units 04/27/25  1616 04/27/25  0502 04/26/25  1855   SODIUM mmol/L  --  141 138   POTASSIUM mmol/L 4.7 3.6 4.1   CHLORIDE mmol/L  --  107 102   CO2 mmol/L  --  26.0 26.0   BUN mg/dL  --  10 11   CREATININE mg/dL  --  0.57 0.69   GLUCOSE mg/dL  --  101* 101*   EGFR mL/min/1.73  --  102.9 98.3     Results from last 7 days   Lab Units 04/26/25  1855   ALBUMIN g/dL 4.1   BILIRUBIN mg/dL 0.9   ALK PHOS U/L 71   AST (SGOT) U/L 20   ALT (SGPT) U/L 25     Results from last 7 days   Lab Units 04/27/25  0502 04/26/25  1855   CALCIUM mg/dL 8.3* 8.8   ALBUMIN g/dL  --  4.1               Invalid input(s): \"LDLCALC\"  Results from last 7 days   Lab Units 04/26/25  1857 04/26/25  1855 04/24/25  1728   BLOODCX  No growth at 24 hours No growth at 24 hours  --    URINE CULTURE   --   --  Final report         Test Results Pending at Discharge     Pending Results       None              Discharge Details        Discharge Medications        New Medications        Instructions Start Date   cephalexin 500 MG capsule  Commonly known as: KEFLEX   Take 2 capsules by mouth Every 8 (Eight) Hours             Continue These Medications        Instructions Start Date   amLODIPine 2.5 MG tablet  Commonly known as: NORVASC   2.5 mg, Oral, Daily      atorvastatin 20 MG tablet  Commonly known as: LIPITOR   20 mg, Oral, Daily      buPROPion  MG 24 hr tablet  Commonly known as: WELLBUTRIN XL   450 mg, Every Morning      buPROPion  MG 24 hr tablet  Commonly known as: WELLBUTRIN XL   1 tablet Every Morning.      clonazePAM 1 MG tablet  Commonly known as: KlonoPIN   1.5 tablets. Takes 1 1/2 tabs as needed.      escitalopram 20 MG tablet  Commonly known as: LEXAPRO       omega-3 acid ethyl esters 1 g capsule  Commonly known as: LOVAZA   2 g, Oral, 2 Times Daily      phenazopyridine 200 MG tablet  Commonly known as: PYRIDIUM   200 mg, Oral, 3 Times Daily PRN      propranolol LA 60 MG 24 hr capsule  Commonly known as: INDERAL LA   60 " mg, Oral, Daily      traZODone 50 MG tablet  Commonly known as: DESYREL   No dose, route, or frequency recorded.      triamcinolone 0.025 % ointment  Commonly known as: KENALOG   1 Application, Topical, 2 Times Daily      valsartan 320 MG tablet  Commonly known as: DIOVAN   160 mg, Oral, Every Morning             Stop These Medications      nitrofurantoin (macrocrystal-monohydrate) 100 MG capsule  Commonly known as: MACROBID              Allergies   Allergen Reactions    Phenergan [Promethazine Hcl] Anxiety       Discharge Disposition:  Home or Self Care      Discharge Diet:  Diet Order   Procedures    Diet: Regular/House; Fluid Consistency: Thin (IDDSI 0)       Discharge Activity:   Activity Instructions       Activity as Tolerated              CODE STATUS:    Code Status and Medical Interventions: CPR (Attempt to Resuscitate); Full Support   Ordered at: 04/26/25 2000     Code Status (Patient has no pulse and is not breathing):    CPR (Attempt to Resuscitate)     Medical Interventions (Patient has pulse or is breathing):    Full Support     Level Of Support Discussed With:    Patient       Future Appointments   Date Time Provider Department Center   6/3/2025  8:30 AM Tiffanie Woodard MD [facility]   2/2/2026  8:00 AM [facility]   2/6/2026  9:15 AM Tiffanie Woodard MD [facility]     Additional Instructions for the Follow-ups that You Need to Schedule       Discharge Follow-up with PCP   As directed       Currently Documented PCP:    Tiffanie Woodard MD    PCP Phone Number:    [phone]     Follow Up Details: 1 to 2 weeks (or sooner if problems)               Follow-up Information       Tiffanie Woodard MD .    Specialty: Internal Medicine  Why: 1 to 2 weeks (or sooner if problems)  Contact information:  [address] [address]  [phone]                             Additional Instructions for the Follow-ups that You Need to Schedule       Discharge  Follow-up with PCP   As directed       Currently Documented PCP:    Tiffanie Woodard MD    PCP Phone Number:    [phone]     Follow Up Details: 1 to 2 weeks (or sooner if problems)            Time Spent on Discharge:  Greater than 30 minutes      Jluis Amador MD  [facility]  04/28/25  08:05 EDT

## 2025-04-29 ENCOUNTER — TRANSITIONAL CARE MANAGEMENT TELEPHONE ENCOUNTER (OUTPATIENT)
Dept: CALL CENTER | Facility: HOSPITAL | Age: 65
End: 2025-04-29
Payer: COMMERCIAL

## 2025-04-29 NOTE — OUTREACH NOTE
Call Center TCM Note      Flowsheet Row Responses   [facility] patient discharged from? [place]   Does the patient have one of the following disease processes/diagnoses(primary or secondary)? Other   TCM attempt successful? Yes   Call start time 0916   Call end time 0917   Discharge diagnosis Cellulitis of right lower extremity   Meds reviewed with patient/caregiver? Yes   Is the patient having any side effects they believe may be caused by any medication additions or changes? No   Does the patient have all medications ordered at discharge? Yes   Is the patient taking all medications as directed (includes completed medication regime)? Yes   Comments 6/3/25   Does the patient have an appointment with their PCP within 7-14 days of discharge? Yes   Has home health visited the patient within 72 hours of discharge? N/A   Psychosocial issues? No   Did the patient receive a copy of their discharge instructions? Yes   Nursing interventions Reviewed instructions with patient   What is the patient's perception of their health status since discharge? Improving   Is the patient/caregiver able to teach back the hierarchy of who to call/visit for symptoms/problems? PCP, Specialist, Home health nurse, Urgent Care, ED, 911 Yes   If the patient is a current smoker, are they able to teach back resources for cessation? Not a smoker   TCM call completed? Yes   Wrap up additional comments Pt doing well and has f/u appt scheduled.   Call end time 0917            Fay MARISCAL - Registered Nurse    4/29/2025, 09:18 EDT

## 2025-04-29 NOTE — OUTREACH NOTE
Call Center TCM Note      Flowsheet Row Responses   [facility] patient discharged from? [place]   Does the patient have one of the following disease processes/diagnoses(primary or secondary)? Other   TCM attempt successful? No   Unsuccessful attempts Attempt 1            Fay Roque Registered Nurse    4/29/2025, 08:47 EDT

## 2025-05-01 LAB
BACTERIA SPEC AEROBE CULT: NORMAL
BACTERIA SPEC AEROBE CULT: NORMAL

## 2025-05-09 ENCOUNTER — OFFICE VISIT (OUTPATIENT)
Dept: INFECTIOUS DISEASES | Facility: CLINIC | Age: 65
End: 2025-05-09
Payer: COMMERCIAL

## 2025-05-09 VITALS
SYSTOLIC BLOOD PRESSURE: 122 MMHG | HEIGHT: 63 IN | TEMPERATURE: 97.5 F | HEART RATE: 55 BPM | DIASTOLIC BLOOD PRESSURE: 75 MMHG | BODY MASS INDEX: 31.25 KG/M2 | WEIGHT: 176.4 LBS | RESPIRATION RATE: 16 BRPM

## 2025-05-09 DIAGNOSIS — Z79.2 LONG TERM (CURRENT) USE OF ANTIBIOTICS: ICD-10-CM

## 2025-05-09 DIAGNOSIS — E66.9 OBESITY (BMI 30-39.9): ICD-10-CM

## 2025-05-09 DIAGNOSIS — L03.90 RECURRENT CELLULITIS: Primary | ICD-10-CM

## 2025-05-09 PROCEDURE — 99214 OFFICE O/P EST MOD 30 MIN: CPT | Performed by: INTERNAL MEDICINE

## 2025-05-09 RX ORDER — PENICILLIN V POTASSIUM 500 MG/1
500 TABLET, FILM COATED ORAL 2 TIMES DAILY
Qty: 180 TABLET | Refills: 3 | Status: SHIPPED | OUTPATIENT
Start: 2025-05-09 | End: 2025-08-07

## 2025-05-09 NOTE — PROGRESS NOTES
ID CLINIC NOTE    CC: f/u recurrent RLE cellulitis    HPI: Brandy El is a 64 y.o. female here for f/u recurrent RLE cellulitis.  I last saw her in the hospital about 11 days ago at which time she had been admitted for cellulitis.  She had significant improvement with cefazolin and was discharged on cephalexin 1 g p.o. every 8 hours x 7 additional days.  We discussed potential options to try to prevent recurrence including long-term penicillin prophylaxis so we made a follow-up visit for today.    She says that her prior cellulitis is resolved.  No fever or chills.  She tolerated cephalexin without any side effects.  She is pleased with her progress.  She would like to trial long-term prophylactic penicillin.     Past Medical History:   Diagnosis Date    Anxiety     ASCUS of cervix with negative high risk HPV 09/2001    ASCUS of cervix with negative high risk HPV 09/2005    Colon polyp 2013    Depression     Essential hematuria     Gait disturbance     R foot drop    Health care maintenance 06/14/2016    Hyperlipidemia     Hypertension 2020    Irritable bowel syndrome many years ago    Postmenopausal     Vaginal delivery        Past Surgical History:   Procedure Laterality Date    ANKLE SURGERY      BACK SURGERY      COLONOSCOPY  10/22/2013    tics, IH, HP    COLONOSCOPY N/A 12/03/2018    Procedure: COLONOSCOPY TO CECUM AND INTO TERMINAL ILEUM WITH COLD BIOPSIES AND COLD BIOPSY POLYPECTOMY;  Surgeon: Syd Emerson MD;  Location: [facility] ENDOSCOPY;  Service: Gastroenterology    ENDOSCOPY N/A 12/03/2018    Procedure: ESOPHAGOGASTRODUODENOSCOPY WITH COLD BIOPSIES;  Surgeon: Syd Emerson MD;  Location: [facility] ENDOSCOPY;  Service: Gastroenterology    LAPAROSCOPIC TUBAL LIGATION Bilateral     TEAR DUCT SURGERY  06/2017    TUBAL ABDOMINAL LIGATION         Social History:  Lives in [place], [place]  Retired  Worked in childcare    Antibiotic allergies and intolerances: none    Medications:     Current Outpatient  "Medications:     amLODIPine (NORVASC) 2.5 MG tablet, TAKE 1 TABLET BY MOUTH DAILY, Disp: 90 tablet, Rfl: 1    atorvastatin (LIPITOR) 20 MG tablet, TAKE 1 TABLET BY MOUTH DAILY, Disp: 90 tablet, Rfl: 1    buPROPion XL (WELLBUTRIN XL) 150 MG 24 hr tablet, Take 3 tablets by mouth Every Morning., Disp: , Rfl:     buPROPion XL (WELLBUTRIN XL) 300 MG 24 hr tablet, 1 tablet Every Morning., Disp: , Rfl:     clonazePAM (KlonoPIN) 1 MG tablet, 1.5 tablets. Takes 1 1/2 tabs as needed., Disp: , Rfl:     escitalopram (LEXAPRO) 20 MG tablet, , Disp: , Rfl:     omega-3 acid ethyl esters (LOVAZA) 1 g capsule, TAKE 2 CAPSULES BY MOUTH TWICE A DAY, Disp: 120 capsule, Rfl: 3    phenazopyridine (PYRIDIUM) 200 MG tablet, Take 1 tablet by mouth 3 (Three) Times a Day As Needed for Bladder Spasms or Dysuria., Disp: 6 tablet, Rfl: 0    propranolol LA (INDERAL LA) 60 MG 24 hr capsule, TAKE 1 CAPSULE BY MOUTH DAILY, Disp: 30 capsule, Rfl: 4    traZODone (DESYREL) 50 MG tablet, , Disp: , Rfl:     triamcinolone (KENALOG) 0.025 % ointment, Apply 1 Application topically to the appropriate area as directed 2 (Two) Times a Day., Disp: 45 g, Rfl: 1    valsartan (DIOVAN) 320 MG tablet, Take 0.5 tablets by mouth Every Morning., Disp: , Rfl:       OBJECTIVE:  /75   Pulse 55   Temp 97.5 °F (36.4 °C)   Resp 16   Ht 160 cm (63\")   Wt 80 kg (176 lb 6.4 oz)   BMI 31.25 kg/m²       General: Awake, alert, very nice, sitting up in chair  Eyes: No scleral icterus.   Cardiovascular: NR  Respiratory: Breathing comfortably on RA  GI: Abdomen is soft, nontender, nondistended  Skin: resolved RLE erythema  Neurological: Alert and oriented  Psychiatric: Normal mood and affect     DIAGNOSTICS:  CBC, BMP, and blood cultures reviewed today  Lab Results   Component Value Date    WBC 4.26 04/27/2025    HGB 11.6 (L) 04/27/2025    HCT 34.6 04/27/2025     04/27/2025     Lab Results   Component Value Date    GLUCOSE 101 (H) 04/27/2025    CALCIUM 8.3 (L) " 04/27/2025     04/27/2025    K 4.7 04/27/2025    CO2 26.0 04/27/2025     04/27/2025    BUN 10 04/27/2025    CREATININE 0.57 04/27/2025    EGFR 102.9 04/27/2025    BCR 17.5 04/27/2025    ANIONGAP 8.0 04/27/2025       Microbiology:  4/26 BCx negative, final  4/24 UCx negative     Prior radiology:  X-ray of the right ankle shows no fracture calcaneal spurring.  Degenerative changes.  No residual hardware identified      ASSESSMENT/PLAN:  Recurrent right lower extremity cellulitis  Obesity BMI 31  Long-term use of antibiotic    She had a good result with intravenous cefazolin in house and then high-dose oral cephalexin at discharge.  Given the recurrent nature of her cellulitis, I think it would be reasonable to place her on penicillin  mg p.o. twice daily x 1 year and evaluate response.  We discussed indications, benefits, and risk and mutually agreed to proceed.  I told her it was my strong opinion that the benefits outweigh the risks.

## 2025-06-27 RX ORDER — ATORVASTATIN CALCIUM 20 MG/1
20 TABLET, FILM COATED ORAL DAILY
Qty: 90 TABLET | Refills: 1 | Status: SHIPPED | OUTPATIENT
Start: 2025-06-27

## 2025-07-25 RX ORDER — PROPRANOLOL HYDROCHLORIDE 60 MG/1
60 CAPSULE, EXTENDED RELEASE ORAL DAILY
Qty: 30 CAPSULE | Refills: 4 | Status: SHIPPED | OUTPATIENT
Start: 2025-07-25

## 2025-07-31 ENCOUNTER — PATIENT ROUNDING (BHMG ONLY) (OUTPATIENT)
Dept: URGENT CARE | Facility: CLINIC | Age: 65
End: 2025-07-31
Payer: COMMERCIAL

## 2025-07-31 NOTE — ED NOTES
Thank you for letting us care for you during your recent visit to [facility]. We would love to hear about your experience with us.     We’re always looking for ways to make our patients’ experiences even better. Do you have any recommendations on ways we may improve?    I appreciate you taking the time to respond. Please be on the lookout for a survey about your recent visit from Breanna Aguiar via text or email. We would greatly appreciate if you could complete the survey. We want your voice to be heard and we value your feedback.     Thank you for choosing [facility] for your healthcare needs.

## 2025-08-08 ENCOUNTER — OFFICE VISIT (OUTPATIENT)
Dept: SPORTS MEDICINE | Facility: CLINIC | Age: 65
End: 2025-08-08
Payer: COMMERCIAL

## 2025-08-08 VITALS
WEIGHT: 176 LBS | HEIGHT: 63 IN | SYSTOLIC BLOOD PRESSURE: 160 MMHG | DIASTOLIC BLOOD PRESSURE: 90 MMHG | HEART RATE: 59 BPM | TEMPERATURE: 97.3 F | BODY MASS INDEX: 31.18 KG/M2 | OXYGEN SATURATION: 96 %

## 2025-08-08 DIAGNOSIS — Z87.81 HISTORY OF AVULSION FRACTURE: ICD-10-CM

## 2025-08-08 DIAGNOSIS — Z87.39 HISTORY OF RIGHT FOOT DROP: ICD-10-CM

## 2025-08-08 DIAGNOSIS — S93.401A MODERATE ANKLE SPRAIN, RIGHT, INITIAL ENCOUNTER: ICD-10-CM

## 2025-08-08 DIAGNOSIS — M25.571 ACUTE RIGHT ANKLE PAIN: Primary | ICD-10-CM

## 2025-08-15 ENCOUNTER — OFFICE VISIT (OUTPATIENT)
Dept: SPORTS MEDICINE | Facility: CLINIC | Age: 65
End: 2025-08-15
Payer: COMMERCIAL

## 2025-08-15 VITALS
SYSTOLIC BLOOD PRESSURE: 124 MMHG | DIASTOLIC BLOOD PRESSURE: 70 MMHG | BODY MASS INDEX: 31.18 KG/M2 | WEIGHT: 176 LBS | HEART RATE: 64 BPM | HEIGHT: 63 IN | RESPIRATION RATE: 16 BRPM | OXYGEN SATURATION: 99 %

## 2025-08-15 DIAGNOSIS — Z87.81 HISTORY OF AVULSION FRACTURE: ICD-10-CM

## 2025-08-15 DIAGNOSIS — S93.401A MODERATE ANKLE SPRAIN, RIGHT, INITIAL ENCOUNTER: ICD-10-CM

## 2025-08-15 DIAGNOSIS — Z87.39 HISTORY OF RIGHT FOOT DROP: ICD-10-CM

## 2025-08-15 DIAGNOSIS — M25.571 ACUTE RIGHT ANKLE PAIN: Primary | ICD-10-CM

## (undated) DEVICE — BITEBLOCK OMNI BLOC

## (undated) DEVICE — TUBING, SUCTION, 1/4" X 10', STRAIGHT: Brand: MEDLINE

## (undated) DEVICE — Device: Brand: DEFENDO AIR/WATER/SUCTION AND BIOPSY VALVE

## (undated) DEVICE — THE TORRENT IRRIGATION SCOPE CONNECTOR IS USED WITH THE TORRENT IRRIGATION TUBING TO PROVIDE IRRIGATION FLUIDS SUCH AS STERILE WATER DURING GASTROINTESTINAL ENDOSCOPIC PROCEDURES WHEN USED IN CONJUNCTION WITH AN IRRIGATION PUMP (OR ELECTROSURGICAL UNIT).: Brand: TORRENT

## (undated) DEVICE — CANN NASL CO2 TRULINK W/O2 A/

## (undated) DEVICE — FRCP BX RADJAW4 NDL 2.8 240CM LG OG BX40